# Patient Record
Sex: FEMALE | Race: WHITE | Employment: OTHER | URBAN - METROPOLITAN AREA
[De-identification: names, ages, dates, MRNs, and addresses within clinical notes are randomized per-mention and may not be internally consistent; named-entity substitution may affect disease eponyms.]

---

## 2017-01-16 ENCOUNTER — OFFICE VISIT (OUTPATIENT)
Dept: FAMILY MEDICINE CLINIC | Age: 57
End: 2017-01-16

## 2017-01-16 ENCOUNTER — HOSPITAL ENCOUNTER (OUTPATIENT)
Dept: CT IMAGING | Age: 57
Discharge: HOME | End: 2017-01-16
Attending: NURSE PRACTITIONER | Admitting: NURSE PRACTITIONER

## 2017-01-16 ENCOUNTER — TELEPHONE (OUTPATIENT)
Dept: FAMILY MEDICINE CLINIC | Age: 57
End: 2017-01-16

## 2017-01-16 VITALS
HEIGHT: 64 IN | BODY MASS INDEX: 35.68 KG/M2 | WEIGHT: 209 LBS | OXYGEN SATURATION: 98 % | DIASTOLIC BLOOD PRESSURE: 86 MMHG | SYSTOLIC BLOOD PRESSURE: 138 MMHG | HEART RATE: 89 BPM

## 2017-01-16 DIAGNOSIS — R10.32 LLQ ABDOMINAL PAIN: ICD-10-CM

## 2017-01-16 DIAGNOSIS — R10.32 LEFT LOWER QUADRANT PAIN: ICD-10-CM

## 2017-01-16 DIAGNOSIS — R10.32 LLQ ABDOMINAL PAIN: Primary | ICD-10-CM

## 2017-01-16 PROCEDURE — 99213 OFFICE O/P EST LOW 20 MIN: CPT | Performed by: NURSE PRACTITIONER

## 2017-01-16 ASSESSMENT — ENCOUNTER SYMPTOMS
CONSTIPATION: 0
VOMITING: 0
NAUSEA: 0
ABDOMINAL PAIN: 1
BLOOD IN STOOL: 0
DIARRHEA: 1

## 2017-01-23 ENCOUNTER — OFFICE VISIT (OUTPATIENT)
Dept: FAMILY MEDICINE CLINIC | Age: 57
End: 2017-01-23

## 2017-01-23 VITALS
OXYGEN SATURATION: 97 % | HEART RATE: 60 BPM | DIASTOLIC BLOOD PRESSURE: 82 MMHG | BODY MASS INDEX: 36.37 KG/M2 | RESPIRATION RATE: 14 BRPM | WEIGHT: 213 LBS | HEIGHT: 64 IN | SYSTOLIC BLOOD PRESSURE: 160 MMHG

## 2017-01-23 DIAGNOSIS — E78.2 MIXED HYPERLIPIDEMIA: ICD-10-CM

## 2017-01-23 DIAGNOSIS — E66.09 NON MORBID OBESITY DUE TO EXCESS CALORIES: ICD-10-CM

## 2017-01-23 DIAGNOSIS — K80.20 CALCULUS OF GALLBLADDER WITHOUT CHOLECYSTITIS WITHOUT OBSTRUCTION: ICD-10-CM

## 2017-01-23 DIAGNOSIS — I10 ESSENTIAL HYPERTENSION: Primary | ICD-10-CM

## 2017-01-23 PROCEDURE — 99214 OFFICE O/P EST MOD 30 MIN: CPT | Performed by: FAMILY MEDICINE

## 2017-01-23 RX ORDER — ACETAMINOPHEN 160 MG
TABLET,DISINTEGRATING ORAL
COMMUNITY
End: 2019-03-12

## 2017-01-23 RX ORDER — AMLODIPINE BESYLATE 10 MG/1
10 TABLET ORAL DAILY
Qty: 90 TABLET | Refills: 3 | Status: SHIPPED | OUTPATIENT
Start: 2017-01-23 | End: 2017-02-03

## 2017-01-23 RX ORDER — LOSARTAN POTASSIUM 50 MG/1
50 TABLET ORAL DAILY
Qty: 30 TABLET | Refills: 3 | Status: SHIPPED | OUTPATIENT
Start: 2017-01-23 | End: 2017-04-03 | Stop reason: DRUGHIGH

## 2017-01-31 ENCOUNTER — OFFICE VISIT (OUTPATIENT)
Dept: SURGERY | Age: 57
End: 2017-01-31

## 2017-01-31 VITALS — WEIGHT: 210 LBS | DIASTOLIC BLOOD PRESSURE: 80 MMHG | BODY MASS INDEX: 35.71 KG/M2 | SYSTOLIC BLOOD PRESSURE: 150 MMHG

## 2017-01-31 DIAGNOSIS — K80.20 SYMPTOMATIC CHOLELITHIASIS: Primary | ICD-10-CM

## 2017-01-31 PROCEDURE — 99243 OFF/OP CNSLTJ NEW/EST LOW 30: CPT | Performed by: SURGERY

## 2017-01-31 ASSESSMENT — ENCOUNTER SYMPTOMS
BACK PAIN: 1
SORE THROAT: 1
EYES NEGATIVE: 1
ALLERGIC/IMMUNOLOGIC NEGATIVE: 1
GASTROINTESTINAL NEGATIVE: 1
SINUS PRESSURE: 1
RESPIRATORY NEGATIVE: 1

## 2017-02-17 ENCOUNTER — OFFICE VISIT (OUTPATIENT)
Dept: SURGERY | Age: 57
End: 2017-02-17

## 2017-02-17 VITALS — DIASTOLIC BLOOD PRESSURE: 80 MMHG | SYSTOLIC BLOOD PRESSURE: 160 MMHG

## 2017-02-17 DIAGNOSIS — K80.20 SYMPTOMATIC CHOLELITHIASIS: Primary | ICD-10-CM

## 2017-02-17 PROCEDURE — 99024 POSTOP FOLLOW-UP VISIT: CPT | Performed by: SURGERY

## 2017-02-20 ENCOUNTER — OFFICE VISIT (OUTPATIENT)
Dept: FAMILY MEDICINE CLINIC | Age: 57
End: 2017-02-20

## 2017-02-20 VITALS
BODY MASS INDEX: 36.02 KG/M2 | DIASTOLIC BLOOD PRESSURE: 90 MMHG | HEIGHT: 64 IN | WEIGHT: 211 LBS | OXYGEN SATURATION: 98 % | HEART RATE: 69 BPM | RESPIRATION RATE: 14 BRPM | SYSTOLIC BLOOD PRESSURE: 160 MMHG

## 2017-02-20 DIAGNOSIS — I10 ESSENTIAL HYPERTENSION: Primary | ICD-10-CM

## 2017-02-20 DIAGNOSIS — E66.09 NON MORBID OBESITY DUE TO EXCESS CALORIES: ICD-10-CM

## 2017-02-20 PROCEDURE — 99213 OFFICE O/P EST LOW 20 MIN: CPT | Performed by: FAMILY MEDICINE

## 2017-02-20 RX ORDER — LOSARTAN POTASSIUM 100 MG/1
100 TABLET ORAL DAILY
Qty: 90 TABLET | Refills: 3 | Status: SHIPPED | OUTPATIENT
Start: 2017-02-20 | End: 2018-01-23 | Stop reason: SDUPTHER

## 2017-04-03 ENCOUNTER — OFFICE VISIT (OUTPATIENT)
Dept: FAMILY MEDICINE CLINIC | Age: 57
End: 2017-04-03

## 2017-04-03 VITALS
HEIGHT: 64 IN | SYSTOLIC BLOOD PRESSURE: 172 MMHG | BODY MASS INDEX: 35.51 KG/M2 | RESPIRATION RATE: 14 BRPM | WEIGHT: 208 LBS | DIASTOLIC BLOOD PRESSURE: 80 MMHG | OXYGEN SATURATION: 98 % | HEART RATE: 68 BPM

## 2017-04-03 DIAGNOSIS — E66.09 NON MORBID OBESITY DUE TO EXCESS CALORIES: ICD-10-CM

## 2017-04-03 DIAGNOSIS — I10 ESSENTIAL HYPERTENSION: Primary | ICD-10-CM

## 2017-04-03 PROCEDURE — 99213 OFFICE O/P EST LOW 20 MIN: CPT | Performed by: FAMILY MEDICINE

## 2017-04-03 RX ORDER — HYDROCHLOROTHIAZIDE 12.5 MG/1
12.5 CAPSULE, GELATIN COATED ORAL DAILY
Qty: 90 CAPSULE | Refills: 3 | Status: SHIPPED | OUTPATIENT
Start: 2017-04-03 | End: 2018-03-19 | Stop reason: SDUPTHER

## 2017-05-02 ENCOUNTER — OFFICE VISIT (OUTPATIENT)
Dept: FAMILY MEDICINE CLINIC | Age: 57
End: 2017-05-02

## 2017-05-02 VITALS
SYSTOLIC BLOOD PRESSURE: 158 MMHG | OXYGEN SATURATION: 97 % | RESPIRATION RATE: 16 BRPM | HEART RATE: 66 BPM | HEIGHT: 64 IN | DIASTOLIC BLOOD PRESSURE: 82 MMHG | WEIGHT: 211 LBS | BODY MASS INDEX: 36.02 KG/M2

## 2017-05-02 DIAGNOSIS — I10 ESSENTIAL HYPERTENSION: Primary | ICD-10-CM

## 2017-05-02 PROCEDURE — 99213 OFFICE O/P EST LOW 20 MIN: CPT | Performed by: FAMILY MEDICINE

## 2017-07-05 ENCOUNTER — OFFICE VISIT (OUTPATIENT)
Dept: FAMILY MEDICINE CLINIC | Age: 57
End: 2017-07-05

## 2017-07-05 VITALS
RESPIRATION RATE: 14 BRPM | OXYGEN SATURATION: 96 % | HEART RATE: 63 BPM | WEIGHT: 207.2 LBS | HEIGHT: 65 IN | DIASTOLIC BLOOD PRESSURE: 80 MMHG | BODY MASS INDEX: 34.52 KG/M2 | SYSTOLIC BLOOD PRESSURE: 148 MMHG

## 2017-07-05 DIAGNOSIS — E78.2 MIXED HYPERLIPIDEMIA: ICD-10-CM

## 2017-07-05 DIAGNOSIS — I10 ESSENTIAL HYPERTENSION: Primary | ICD-10-CM

## 2017-07-05 DIAGNOSIS — E66.09 NON MORBID OBESITY DUE TO EXCESS CALORIES: ICD-10-CM

## 2017-07-05 PROCEDURE — 99213 OFFICE O/P EST LOW 20 MIN: CPT | Performed by: FAMILY MEDICINE

## 2017-07-05 RX ORDER — AMLODIPINE BESYLATE 5 MG/1
5 TABLET ORAL DAILY
Qty: 30 TABLET | Refills: 3 | Status: SHIPPED | OUTPATIENT
Start: 2017-07-05 | End: 2017-10-28 | Stop reason: SDUPTHER

## 2017-08-02 ENCOUNTER — OFFICE VISIT (OUTPATIENT)
Dept: FAMILY MEDICINE CLINIC | Age: 57
End: 2017-08-02

## 2017-08-02 VITALS
OXYGEN SATURATION: 98 % | SYSTOLIC BLOOD PRESSURE: 130 MMHG | BODY MASS INDEX: 35.09 KG/M2 | DIASTOLIC BLOOD PRESSURE: 72 MMHG | WEIGHT: 210.6 LBS | RESPIRATION RATE: 16 BRPM | HEART RATE: 68 BPM | HEIGHT: 65 IN

## 2017-08-02 DIAGNOSIS — I10 ESSENTIAL HYPERTENSION: Primary | ICD-10-CM

## 2017-08-02 PROCEDURE — 99213 OFFICE O/P EST LOW 20 MIN: CPT | Performed by: FAMILY MEDICINE

## 2017-10-28 DIAGNOSIS — I10 ESSENTIAL HYPERTENSION: ICD-10-CM

## 2017-10-30 RX ORDER — AMLODIPINE BESYLATE 5 MG/1
TABLET ORAL
Qty: 30 TABLET | Refills: 3 | Status: SHIPPED | OUTPATIENT
Start: 2017-10-30 | End: 2017-11-28 | Stop reason: ALTCHOICE

## 2017-10-30 NOTE — TELEPHONE ENCOUNTER
Last OV  8/2/17    Last refill  7/5/17    # 30    R 3    Lab Results   Component Value Date     02/03/2017    K 3.6 02/03/2017    CL 98 (L) 02/03/2017    CO2 27 02/03/2017    BUN 18 02/03/2017    CREATININE <0.5 (L) 02/03/2017    GLUCOSE 158 (H) 02/03/2017    CALCIUM 9.6 02/03/2017    PROT 8.4 (H) 02/03/2017    LABALBU 4.8 02/03/2017    BILITOT 0.3 02/03/2017    ALKPHOS 60 02/03/2017    AST 17 02/03/2017    ALT 22 02/03/2017    LABGLOM >60 02/03/2017    GFRAA >60 02/03/2017    AGRATIO 1.3 02/03/2017    GLOB 3.6 02/03/2017

## 2017-11-07 ENCOUNTER — TELEPHONE (OUTPATIENT)
Dept: FAMILY MEDICINE CLINIC | Age: 57
End: 2017-11-07

## 2017-11-07 DIAGNOSIS — Z00.00 WELL ADULT EXAM: ICD-10-CM

## 2017-11-07 DIAGNOSIS — E78.2 MIXED HYPERLIPIDEMIA: Primary | ICD-10-CM

## 2017-11-07 DIAGNOSIS — I10 ESSENTIAL HYPERTENSION: ICD-10-CM

## 2017-11-07 DIAGNOSIS — R73.09 ELEVATED GLUCOSE: ICD-10-CM

## 2017-11-07 NOTE — TELEPHONE ENCOUNTER
Pt scheduled an appt for a phy on 11/28/17 / Dr. Pam Ladd. Needs orders placed to get bloodwork prior to appt.  Please advise

## 2017-11-22 DIAGNOSIS — R73.09 ELEVATED GLUCOSE: ICD-10-CM

## 2017-11-22 DIAGNOSIS — I10 ESSENTIAL HYPERTENSION: ICD-10-CM

## 2017-11-22 DIAGNOSIS — Z00.00 WELL ADULT EXAM: ICD-10-CM

## 2017-11-22 DIAGNOSIS — E78.2 MIXED HYPERLIPIDEMIA: ICD-10-CM

## 2017-11-22 LAB
A/G RATIO: 1.6 (ref 1.1–2.2)
ALBUMIN SERPL-MCNC: 4.4 G/DL (ref 3.4–5)
ALP BLD-CCNC: 60 U/L (ref 40–129)
ALT SERPL-CCNC: 20 U/L (ref 10–40)
ANION GAP SERPL CALCULATED.3IONS-SCNC: 14 MMOL/L (ref 3–16)
AST SERPL-CCNC: 18 U/L (ref 15–37)
BASOPHILS ABSOLUTE: 0.1 K/UL (ref 0–0.2)
BASOPHILS RELATIVE PERCENT: 0.7 %
BILIRUB SERPL-MCNC: 0.5 MG/DL (ref 0–1)
BUN BLDV-MCNC: 17 MG/DL (ref 7–20)
CALCIUM SERPL-MCNC: 9.4 MG/DL (ref 8.3–10.6)
CHLORIDE BLD-SCNC: 102 MMOL/L (ref 99–110)
CHOLESTEROL, TOTAL: 217 MG/DL (ref 0–199)
CO2: 26 MMOL/L (ref 21–32)
CREAT SERPL-MCNC: <0.5 MG/DL (ref 0.6–1.1)
EOSINOPHILS ABSOLUTE: 0.4 K/UL (ref 0–0.6)
EOSINOPHILS RELATIVE PERCENT: 5.9 %
GFR AFRICAN AMERICAN: >60
GFR NON-AFRICAN AMERICAN: >60
GLOBULIN: 2.7 G/DL
GLUCOSE BLD-MCNC: 101 MG/DL (ref 70–99)
HCT VFR BLD CALC: 41.5 % (ref 36–48)
HDLC SERPL-MCNC: 62 MG/DL (ref 40–60)
HEMOGLOBIN: 13.7 G/DL (ref 12–16)
HEPATITIS C ANTIBODY INTERPRETATION: NORMAL
LDL CHOLESTEROL CALCULATED: 138 MG/DL
LYMPHOCYTES ABSOLUTE: 2.8 K/UL (ref 1–5.1)
LYMPHOCYTES RELATIVE PERCENT: 36.7 %
MCH RBC QN AUTO: 28.2 PG (ref 26–34)
MCHC RBC AUTO-ENTMCNC: 32.9 G/DL (ref 31–36)
MCV RBC AUTO: 85.8 FL (ref 80–100)
MONOCYTES ABSOLUTE: 0.6 K/UL (ref 0–1.3)
MONOCYTES RELATIVE PERCENT: 8.6 %
NEUTROPHILS ABSOLUTE: 3.6 K/UL (ref 1.7–7.7)
NEUTROPHILS RELATIVE PERCENT: 48.1 %
PDW BLD-RTO: 13.2 % (ref 12.4–15.4)
PLATELET # BLD: 297 K/UL (ref 135–450)
PMV BLD AUTO: 9.4 FL (ref 5–10.5)
POTASSIUM SERPL-SCNC: 4 MMOL/L (ref 3.5–5.1)
RBC # BLD: 4.84 M/UL (ref 4–5.2)
SODIUM BLD-SCNC: 142 MMOL/L (ref 136–145)
TOTAL PROTEIN: 7.1 G/DL (ref 6.4–8.2)
TRIGL SERPL-MCNC: 83 MG/DL (ref 0–150)
TSH REFLEX: 0.82 UIU/ML (ref 0.27–4.2)
VLDLC SERPL CALC-MCNC: 17 MG/DL
WBC # BLD: 7.5 K/UL (ref 4–11)

## 2017-11-23 LAB
ESTIMATED AVERAGE GLUCOSE: 119.8 MG/DL
HBA1C MFR BLD: 5.8 %
HIV-1 AND HIV-2 ANTIBODIES: NORMAL

## 2017-11-28 ENCOUNTER — OFFICE VISIT (OUTPATIENT)
Dept: FAMILY MEDICINE CLINIC | Age: 57
End: 2017-11-28

## 2017-11-28 VITALS
DIASTOLIC BLOOD PRESSURE: 80 MMHG | WEIGHT: 212.6 LBS | BODY MASS INDEX: 35.42 KG/M2 | HEIGHT: 65 IN | SYSTOLIC BLOOD PRESSURE: 138 MMHG | OXYGEN SATURATION: 97 % | HEART RATE: 69 BPM

## 2017-11-28 DIAGNOSIS — Z00.00 ANNUAL PHYSICAL EXAM: ICD-10-CM

## 2017-11-28 DIAGNOSIS — Z87.891 EX-HEAVY CIGARETTE SMOKER (20-39 PER DAY): ICD-10-CM

## 2017-11-28 DIAGNOSIS — R73.03 PREDIABETES: ICD-10-CM

## 2017-11-28 DIAGNOSIS — I10 ESSENTIAL HYPERTENSION: ICD-10-CM

## 2017-11-28 DIAGNOSIS — Z23 FLU VACCINE NEED: Primary | ICD-10-CM

## 2017-11-28 LAB
BILIRUBIN, POC: NORMAL
BLOOD URINE, POC: NORMAL
CLARITY, POC: NORMAL
COLOR, POC: YELLOW
GLUCOSE URINE, POC: NORMAL
KETONES, POC: NORMAL
LEUKOCYTE EST, POC: NORMAL
NITRITE, POC: NORMAL
PH, POC: 6.5
PROTEIN, POC: NORMAL
SPECIFIC GRAVITY, POC: 1.02
UROBILINOGEN, POC: 0.2

## 2017-11-28 PROCEDURE — 81002 URINALYSIS NONAUTO W/O SCOPE: CPT | Performed by: FAMILY MEDICINE

## 2017-11-28 PROCEDURE — 90471 IMMUNIZATION ADMIN: CPT | Performed by: FAMILY MEDICINE

## 2017-11-28 PROCEDURE — 90688 IIV4 VACCINE SPLT 0.5 ML IM: CPT | Performed by: FAMILY MEDICINE

## 2017-11-28 PROCEDURE — 99396 PREV VISIT EST AGE 40-64: CPT | Performed by: FAMILY MEDICINE

## 2017-11-28 RX ORDER — AMLODIPINE BESYLATE 10 MG/1
10 TABLET ORAL NIGHTLY
Qty: 90 TABLET | Refills: 1 | Status: SHIPPED | OUTPATIENT
Start: 2017-11-28 | End: 2019-03-12

## 2017-11-28 NOTE — PROGRESS NOTES
Chief Complaint:   Janis Michael is a 61 y.o. female who presents for complete physical examination    History of Present Illness:    Meds, vitamins and allergies reviewed with pt  Labs reviewed with pt  Requesting low dose chest CT with a past history of smoking and smoke exposure  TRACE/BSO , does not feel she needs a GYN exam  Takes care of her handicapped daughter all day long, stress eater  has lost significant weight in the past- knows she needs to get back on healthy diet  Requesting flu vaccine    Wt Readings from Last 3 Encounters:   11/28/17 212 lb 9.6 oz (96.4 kg)   08/02/17 210 lb 9.6 oz (95.5 kg)   07/05/17 207 lb 3.2 oz (94 kg)       Patient Active Problem List   Diagnosis    HTN (hypertension)    Hyperlipidemia    Obesity    Hypertension    Breast lump or mass    Calculus of gallbladder without cholecystitis    Acute cholecystitis due to biliary calculus     Past Medical History:   Diagnosis Date    Breast lump or mass     Gallstones     Hypertension        Past Surgical History:   Procedure Laterality Date    BREAST SURGERY  [id number]    CHOLECYSTECTOMY, LAPAROSCOPIC  02/03/2017    COLONOSCOPY  8.1.2010    CYSTOSCOPY  7.19.2007    HYSTERECTOMY  2007       Current Outpatient Prescriptions   Medication Sig Dispense Refill    amLODIPine (NORVASC) 10 MG tablet Take 1 tablet by mouth nightly 90 tablet 1    hydrochlorothiazide (MICROZIDE) 12.5 MG capsule Take 1 capsule by mouth daily 90 capsule 3    losartan (COZAAR) 100 MG tablet Take 1 tablet by mouth daily 90 tablet 3    aspirin 81 MG tablet Take 81 mg by mouth daily      Cholecalciferol (VITAMIN D3) 2000 UNITS CAPS Take by mouth       No current facility-administered medications for this visit.       Allergies   Allergen Reactions    Morphine     Penicillins        Social History     Social History    Marital status:      Spouse name: N/A    Number of children: N/A    Years of education: N/A     Social History Main Topics    Smoking status: Former Smoker    Smokeless tobacco: Never Used    Alcohol use No    Drug use: Unknown    Sexual activity: Yes     Partners: Male     Other Topics Concern    None     Social History Narrative    None     Family History   Problem Relation Age of Onset    Cancer Mother     Cancer Father     Cancer Brother                              REVIEW OF SYSTEMS:   CONSTITUTIONAL: Some weight gain , no fatigue,no  weakness, no night sweats or fever. HEENT: No new vision difficulties or ringing in the ears. RESPIRATORY: No new SOB, PND, orthopnea or cough. CARDIOVASCULAR: no CP, palpitations or SOB with exertion  GI: No nausea, vomiting, diarrhea, constipation, abdominal pain or changes in bowel habits. : No urinary frequency, urgency, incontinence hematuria or dysuria. SKIN: No cyanosis or skin lesions. MUSCULOSKELETAL: No new muscle or joint pain. NEUROLOGICAL: No syncope or TIA-like symptoms. PSYCHIATRIC: No anxiety, insomnia or depression     PHYSICAL EXAMINATION:  /80 (Site: Left Arm, Position: Sitting, Cuff Size: Large Adult)   Pulse 69   Ht 5' 4.5\" (1.638 m)   Wt 212 lb 9.6 oz (96.4 kg)   SpO2 97%   BMI 35.93 kg/m²   General appearance:  alert, no distress  Skin: Skin color, texture, turgor normal. No rashes or lesions. Head: Normocephalic. No masses, lesions, tenderness or abnormalities  Eyes: conjunctivae/corneas clear. PERRL, EOM's intact. Ears: External ears normal. Canals clear. TM's clear bilaterally. Nose/Sinuses: Nares normal. Septum midline. Mucosa normal. No drainage or sinus tenderness. Oropharynx: Lips, mucosa, and tongue normal. Teeth and gums normal. Oropharynx clear with no exudate seen. Neck: Neck supple, and symmetric. No adenopathy. Thyroid symmetric, normal size, without nodule. Trachea is midline. No bruits. Back: Back symmetric, no curvature. ROM normal. No CVA tenderness. Lungs: Lungs clear to auscultation bilaterally.   No retractions or use of accessory muscles. Heart: Regular rate and rhythm, with no rub, murmur or gallop noted. Breasts: no masses, skin changes or DC  Abdomen: Abdomen soft, non-tender, obese , BS normal. No masses, organomegaly. No hernia noted. Extremities: Extremities normal. No deformities, edema, or skin discoloration. No cyanosis or clubbing noted to the nails. Lymph: No lymphadenopathy of the neck or supraclavicular regions. Musculoskeletal: Spine ROM normal. Muscular strength intact. Peripheral pulses: radial=4/4, femoral=4/4, dorsalis pedis=4/4,  Neuro: Cranial nerves intact, Gait normal. Reflexes normal and symmetric, with no pathologic reflex noted. No focal weakness. Normal sensation to light touch. Pelvic/Rectal: Exam deferred to OB/GYN    All labs reviewed with patient    Urinalysis dip is normal    Lab Results   Component Value Date    LABA1C 5.8 11/22/2017     Lab Results   Component Value Date    .8 11/22/2017     Lab Results   Component Value Date    CHOL 217 (H) 11/22/2017    CHOL 214 (H) 12/05/2016    CHOL 201 (H) 12/14/2015     Lab Results   Component Value Date    TRIG 83 11/22/2017    TRIG 83 12/05/2016    TRIG 136 12/14/2015     Lab Results   Component Value Date    HDL 62 (H) 11/22/2017    HDL 53 12/05/2016    HDL 48 12/14/2015     Lab Results   Component Value Date    LDLCALC 138 (H) 11/22/2017    LDLCALC 144 (H) 12/05/2016    LDLCALC 126 (H) 12/14/2015     Lab Results   Component Value Date    LABVLDL 17 11/22/2017    LABVLDL 17 12/05/2016    LABVLDL 27 12/14/2015     Lab Results   Component Value Date    WBC 7.5 11/22/2017    HGB 13.7 11/22/2017    HCT 41.5 11/22/2017    MCV 85.8 11/22/2017     11/22/2017     ASSESSMENT/PLAN:  1. Flu vaccine need  Administered today  - INFLUENZA, QUADV, 3 YRS AND OLDER, IM, MDV, 0.5ML ([facility])    2. Annual physical exam  Healthy diet and exercise  - POCT Urinalysis no Micro    3.  Essential hypertension  DC verapamil and take 10 mg of Norvasc at bedtime  Monitor blood pressure and report in 2 weeks  Recheck one month for blood pressure check  - amLODIPine (NORVASC) 10 MG tablet; Take 1 tablet by mouth nightly  Dispense: 90 tablet; Refill: 1    4. Prediabetes  Healthy diet and exercise/Mediterranean diet info given to patient  - Hemoglobin A1C; Future  - Lipid Panel; Future    5.  Ex-heavy cigarette smoker (20-39 per day)  Patient requesting another chest CT  Many family members with lung and throat cancer from smoking and smoke exposure  - CT Chest Diagnostic Low Dose; Future

## 2017-11-28 NOTE — PATIENT INSTRUCTIONS
a medical condition or this instruction, always ask your healthcare professional. [place] [id number] any warranty or liability for your use of this information. 1 year

## 2018-01-23 RX ORDER — LOSARTAN POTASSIUM 100 MG/1
TABLET ORAL
Qty: 30 TABLET | Refills: 9 | Status: SHIPPED | OUTPATIENT
Start: 2018-01-23 | End: 2018-11-06 | Stop reason: SDUPTHER

## 2018-01-23 NOTE — TELEPHONE ENCOUNTER
Last OV 11/28/2017 annual exam   Last Refill 02/20/2017 #90 3 refills   Lab Results   Component Value Date     11/22/2017    K 4.0 11/22/2017     11/22/2017    CO2 26 11/22/2017    BUN 17 11/22/2017    CREATININE <0.5 (L) 11/22/2017    GLUCOSE 101 (H) 11/22/2017    CALCIUM 9.4 11/22/2017    PROT 7.1 11/22/2017    LABALBU 4.4 11/22/2017    BILITOT 0.5 11/22/2017    ALKPHOS 60 11/22/2017    AST 18 11/22/2017    ALT 20 11/22/2017    LABGLOM >60 11/22/2017    GFRAA >60 11/22/2017    AGRATIO 1.6 11/22/2017    GLOB 2.7 11/22/2017

## 2018-03-19 DIAGNOSIS — I10 ESSENTIAL HYPERTENSION: ICD-10-CM

## 2018-03-19 DIAGNOSIS — E66.09 NON MORBID OBESITY DUE TO EXCESS CALORIES: ICD-10-CM

## 2018-03-19 RX ORDER — HYDROCHLOROTHIAZIDE 12.5 MG/1
CAPSULE, GELATIN COATED ORAL
Qty: 90 CAPSULE | Refills: 3 | Status: SHIPPED | OUTPATIENT
Start: 2018-03-19 | End: 2019-03-12 | Stop reason: SDUPTHER

## 2018-11-06 RX ORDER — LOSARTAN POTASSIUM 100 MG/1
TABLET ORAL
Qty: 30 TABLET | Refills: 0 | Status: SHIPPED | OUTPATIENT
Start: 2018-11-06 | End: 2019-03-12

## 2019-02-28 ENCOUNTER — TELEPHONE (OUTPATIENT)
Dept: FAMILY MEDICINE CLINIC | Age: 59
End: 2019-02-28

## 2019-02-28 DIAGNOSIS — R73.9 HYPERGLYCEMIA: ICD-10-CM

## 2019-02-28 DIAGNOSIS — Z00.00 ANNUAL PHYSICAL EXAM: Primary | ICD-10-CM

## 2019-02-28 DIAGNOSIS — E78.2 MIXED HYPERLIPIDEMIA: ICD-10-CM

## 2019-02-28 DIAGNOSIS — I10 HYPERTENSION, UNSPECIFIED TYPE: ICD-10-CM

## 2019-03-04 DIAGNOSIS — R73.9 HYPERGLYCEMIA: ICD-10-CM

## 2019-03-04 DIAGNOSIS — Z00.00 ANNUAL PHYSICAL EXAM: ICD-10-CM

## 2019-03-04 DIAGNOSIS — I10 HYPERTENSION, UNSPECIFIED TYPE: ICD-10-CM

## 2019-03-04 LAB
A/G RATIO: 1.4 (ref 1.1–2.2)
ALBUMIN SERPL-MCNC: 3.7 G/DL (ref 3.4–5)
ALP BLD-CCNC: 48 U/L (ref 40–129)
ALT SERPL-CCNC: 11 U/L (ref 10–40)
ANION GAP SERPL CALCULATED.3IONS-SCNC: 14 MMOL/L (ref 3–16)
AST SERPL-CCNC: 11 U/L (ref 15–37)
BASOPHILS ABSOLUTE: 0.1 K/UL (ref 0–0.2)
BASOPHILS RELATIVE PERCENT: 0.6 %
BILIRUB SERPL-MCNC: 0.9 MG/DL (ref 0–1)
BUN BLDV-MCNC: 11 MG/DL (ref 7–20)
CALCIUM SERPL-MCNC: 9 MG/DL (ref 8.3–10.6)
CHLORIDE BLD-SCNC: 102 MMOL/L (ref 99–110)
CHOLESTEROL, TOTAL: 173 MG/DL (ref 0–199)
CO2: 27 MMOL/L (ref 21–32)
CREAT SERPL-MCNC: <0.5 MG/DL (ref 0.6–1.1)
EOSINOPHILS ABSOLUTE: 0.1 K/UL (ref 0–0.6)
EOSINOPHILS RELATIVE PERCENT: 0.6 %
ESTIMATED AVERAGE GLUCOSE: 114 MG/DL
GFR AFRICAN AMERICAN: >60
GFR NON-AFRICAN AMERICAN: >60
GLOBULIN: 2.7 G/DL
GLUCOSE BLD-MCNC: 113 MG/DL (ref 70–99)
HBA1C MFR BLD: 5.6 %
HCT VFR BLD CALC: 38.9 % (ref 36–48)
HDLC SERPL-MCNC: 82 MG/DL (ref 40–60)
HEMOGLOBIN: 13 G/DL (ref 12–16)
LDL CHOLESTEROL CALCULATED: 80 MG/DL
LYMPHOCYTES ABSOLUTE: 1.9 K/UL (ref 1–5.1)
LYMPHOCYTES RELATIVE PERCENT: 20.7 %
MCH RBC QN AUTO: 28.8 PG (ref 26–34)
MCHC RBC AUTO-ENTMCNC: 33.4 G/DL (ref 31–36)
MCV RBC AUTO: 86.3 FL (ref 80–100)
MONOCYTES ABSOLUTE: 1 K/UL (ref 0–1.3)
MONOCYTES RELATIVE PERCENT: 10.5 %
NEUTROPHILS ABSOLUTE: 6.3 K/UL (ref 1.7–7.7)
NEUTROPHILS RELATIVE PERCENT: 67.6 %
PDW BLD-RTO: 13.5 % (ref 12.4–15.4)
PLATELET # BLD: 244 K/UL (ref 135–450)
PMV BLD AUTO: 9.8 FL (ref 5–10.5)
POTASSIUM SERPL-SCNC: 3.5 MMOL/L (ref 3.5–5.1)
RBC # BLD: 4.5 M/UL (ref 4–5.2)
SODIUM BLD-SCNC: 143 MMOL/L (ref 136–145)
TOTAL PROTEIN: 6.4 G/DL (ref 6.4–8.2)
TRIGL SERPL-MCNC: 55 MG/DL (ref 0–150)
TSH SERPL DL<=0.05 MIU/L-ACNC: 0.42 UIU/ML (ref 0.27–4.2)
VITAMIN D 25-HYDROXY: 18.7 NG/ML
VLDLC SERPL CALC-MCNC: 11 MG/DL
WBC # BLD: 9.4 K/UL (ref 4–11)

## 2019-03-12 ENCOUNTER — OFFICE VISIT (OUTPATIENT)
Dept: FAMILY MEDICINE CLINIC | Age: 59
End: 2019-03-12
Payer: COMMERCIAL

## 2019-03-12 ENCOUNTER — PATIENT MESSAGE (OUTPATIENT)
Dept: FAMILY MEDICINE CLINIC | Age: 59
End: 2019-03-12

## 2019-03-12 VITALS
DIASTOLIC BLOOD PRESSURE: 80 MMHG | HEART RATE: 63 BPM | WEIGHT: 184 LBS | BODY MASS INDEX: 31.41 KG/M2 | HEIGHT: 64 IN | SYSTOLIC BLOOD PRESSURE: 150 MMHG | OXYGEN SATURATION: 98 %

## 2019-03-12 DIAGNOSIS — Z80.1 FAMILY HISTORY OF LUNG CANCER: ICD-10-CM

## 2019-03-12 DIAGNOSIS — Z87.891 EX-CIGARETTE SMOKER: ICD-10-CM

## 2019-03-12 DIAGNOSIS — E78.2 MIXED HYPERLIPIDEMIA: ICD-10-CM

## 2019-03-12 DIAGNOSIS — Z00.00 PHYSICAL EXAM, ROUTINE: Primary | ICD-10-CM

## 2019-03-12 DIAGNOSIS — I10 ESSENTIAL HYPERTENSION: ICD-10-CM

## 2019-03-12 DIAGNOSIS — E66.09 NON MORBID OBESITY DUE TO EXCESS CALORIES: ICD-10-CM

## 2019-03-12 LAB
BILIRUBIN, POC: NORMAL
BLOOD URINE, POC: NORMAL
CLARITY, POC: CLEAR
COLOR, POC: YELLOW
GLUCOSE URINE, POC: NORMAL
KETONES, POC: NORMAL
LEUKOCYTE EST, POC: NORMAL
NITRITE, POC: NORMAL
PH, POC: 6
PROTEIN, POC: NORMAL
SPECIFIC GRAVITY, POC: 1.02
UROBILINOGEN, POC: 0.2

## 2019-03-12 PROCEDURE — 81002 URINALYSIS NONAUTO W/O SCOPE: CPT | Performed by: FAMILY MEDICINE

## 2019-03-12 PROCEDURE — 99396 PREV VISIT EST AGE 40-64: CPT | Performed by: FAMILY MEDICINE

## 2019-03-12 RX ORDER — HYDROCHLOROTHIAZIDE 12.5 MG/1
12.5 CAPSULE, GELATIN COATED ORAL
Qty: 90 CAPSULE | Refills: 3 | Status: SHIPPED | OUTPATIENT
Start: 2019-03-12 | End: 2019-09-17 | Stop reason: ALTCHOICE

## 2019-03-12 ASSESSMENT — PATIENT HEALTH QUESTIONNAIRE - PHQ9
SUM OF ALL RESPONSES TO PHQ QUESTIONS 1-9: 0
SUM OF ALL RESPONSES TO PHQ QUESTIONS 1-9: 0
1. LITTLE INTEREST OR PLEASURE IN DOING THINGS: 0
SUM OF ALL RESPONSES TO PHQ9 QUESTIONS 1 & 2: 0
2. FEELING DOWN, DEPRESSED OR HOPELESS: 0

## 2019-03-19 ENCOUNTER — HOSPITAL ENCOUNTER (OUTPATIENT)
Dept: CT IMAGING | Age: 59
Discharge: HOME | End: 2019-03-19
Payer: COMMERCIAL

## 2019-03-19 PROCEDURE — G0297 LDCT FOR LUNG CA SCREEN: HCPCS

## 2019-08-13 DIAGNOSIS — Z87.898 HX OF ABNORMAL MAMMOGRAM: Primary | ICD-10-CM

## 2019-09-17 ENCOUNTER — OFFICE VISIT (OUTPATIENT)
Dept: FAMILY MEDICINE CLINIC | Age: 59
End: 2019-09-17
Payer: COMMERCIAL

## 2019-09-17 DIAGNOSIS — Z01.818 PREOP EXAMINATION: Primary | ICD-10-CM

## 2019-09-17 DIAGNOSIS — S89.91XA INJURY OF RIGHT KNEE, INITIAL ENCOUNTER: ICD-10-CM

## 2019-09-17 DIAGNOSIS — I10 ESSENTIAL HYPERTENSION: ICD-10-CM

## 2019-09-17 DIAGNOSIS — Z82.49 FAMILY HISTORY OF ESSENTIAL HYPERTENSION: ICD-10-CM

## 2019-09-17 LAB
A/G RATIO: 1.9 (ref 1.1–2.2)
ALBUMIN SERPL-MCNC: 4.7 G/DL (ref 3.4–5)
ALP BLD-CCNC: 64 U/L (ref 40–129)
ALT SERPL-CCNC: 18 U/L (ref 10–40)
ANION GAP SERPL CALCULATED.3IONS-SCNC: 16 MMOL/L (ref 3–16)
AST SERPL-CCNC: 18 U/L (ref 15–37)
BASOPHILS ABSOLUTE: 0.2 K/UL (ref 0–0.2)
BASOPHILS RELATIVE PERCENT: 2.5 %
BILIRUB SERPL-MCNC: 0.4 MG/DL (ref 0–1)
BUN BLDV-MCNC: 12 MG/DL (ref 7–20)
CALCIUM SERPL-MCNC: 9.6 MG/DL (ref 8.3–10.6)
CHLORIDE BLD-SCNC: 99 MMOL/L (ref 99–110)
CO2: 26 MMOL/L (ref 21–32)
CREAT SERPL-MCNC: <0.5 MG/DL (ref 0.6–1.2)
EOSINOPHILS ABSOLUTE: 0.2 K/UL (ref 0–0.6)
EOSINOPHILS RELATIVE PERCENT: 3.1 %
GFR AFRICAN AMERICAN: >60
GFR NON-AFRICAN AMERICAN: >60
GLOBULIN: 2.5 G/DL
GLUCOSE BLD-MCNC: 102 MG/DL (ref 70–99)
HCT VFR BLD CALC: 39.8 % (ref 36–48)
HEMOGLOBIN: 13.3 G/DL (ref 12–16)
LYMPHOCYTES ABSOLUTE: 2.1 K/UL (ref 1–5.1)
LYMPHOCYTES RELATIVE PERCENT: 30.1 %
MCH RBC QN AUTO: 28.4 PG (ref 26–34)
MCHC RBC AUTO-ENTMCNC: 33.4 G/DL (ref 31–36)
MCV RBC AUTO: 85.1 FL (ref 80–100)
MONOCYTES ABSOLUTE: 0.4 K/UL (ref 0–1.3)
MONOCYTES RELATIVE PERCENT: 5.5 %
NEUTROPHILS ABSOLUTE: 4.1 K/UL (ref 1.7–7.7)
NEUTROPHILS RELATIVE PERCENT: 58.8 %
PDW BLD-RTO: 13.8 % (ref 12.4–15.4)
PLATELET # BLD: 298 K/UL (ref 135–450)
PMV BLD AUTO: 9.4 FL (ref 5–10.5)
POTASSIUM SERPL-SCNC: 3.5 MMOL/L (ref 3.5–5.1)
RBC # BLD: 4.67 M/UL (ref 4–5.2)
SODIUM BLD-SCNC: 141 MMOL/L (ref 136–145)
TOTAL PROTEIN: 7.2 G/DL (ref 6.4–8.2)
TSH REFLEX: 0.24 UIU/ML (ref 0.27–4.2)
WBC # BLD: 7 K/UL (ref 4–11)

## 2019-09-17 PROCEDURE — 99243 OFF/OP CNSLTJ NEW/EST LOW 30: CPT | Performed by: FAMILY MEDICINE

## 2019-09-17 PROCEDURE — 36415 COLL VENOUS BLD VENIPUNCTURE: CPT | Performed by: FAMILY MEDICINE

## 2019-09-17 PROCEDURE — 93000 ELECTROCARDIOGRAM COMPLETE: CPT | Performed by: FAMILY MEDICINE

## 2019-09-17 RX ORDER — LISINOPRIL AND HYDROCHLOROTHIAZIDE 12.5; 1 MG/1; MG/1
1 TABLET ORAL DAILY
Qty: 90 TABLET | Refills: 1 | Status: SHIPPED | OUTPATIENT
Start: 2019-09-17 | End: 2019-09-23 | Stop reason: SDUPTHER

## 2019-09-17 NOTE — PROGRESS NOTES
LAPAROSCOPIC  2017    COLONOSCOPY  8.1.    CYSTOSCOPY  7.19.    HYSTERECTOMY       Family History is not significant for reactions to anesthesia  Social History     Socioeconomic History    Marital status:      Spouse name: Not on file    Number of children: Not on file    Years of education: Not on file    Highest education level: Not on file   Occupational History    Not on file   Social Needs    Financial resource strain: Not on file    Food insecurity:     Worry: Not on file     Inability: Not on file    Transportation needs:     Medical: Not on file     Non-medical: Not on file   Tobacco Use    Smoking status: Former Smoker     Packs/day: 1.75     Years: 30.00     Pack years: 52.50     Types: Cigarettes     Last attempt to quit: 2005     Years since quittin.7    Smokeless tobacco: Never Used   Substance and Sexual Activity    Alcohol use: No     Alcohol/week: 0.0 standard drinks    Drug use: Not on file    Sexual activity: Yes     Partners: Male   Lifestyle    Physical activity:     Days per week: Not on file     Minutes per session: Not on file    Stress: Not on file   Relationships    Social connections:     Talks on phone: Not on file     Gets together: Not on file     Attends [employer]ze service: Not on file     Active member of club or organization: Not on file     Attends meetings of clubs or organizations: Not on file     Relationship status: Not on file    Intimate partner violence:     Fear of current or ex partner: Not on file     Emotionally abused: Not on file     Physically abused: Not on file     Forced sexual activity: Not on file   Other Topics Concern    Not on file   Social History Narrative    Not on file       REVIEW OF SYSTEMS:   CONSTITUTIONAL: No major weight gain or loss, fatigue, weakness, night sweats or fever. HEENT: No new vision difficulties or ringing in the ears. RESPIRATORY: No new SOB, PND, orthopnea or cough.

## 2019-09-18 LAB
ESTIMATED AVERAGE GLUCOSE: 114 MG/DL
HBA1C MFR BLD: 5.6 %
T3 TOTAL: 1.73 NG/ML (ref 0.8–2)
T4 FREE: 1.5 NG/DL (ref 0.9–1.8)

## 2019-09-23 ENCOUNTER — OFFICE VISIT (OUTPATIENT)
Dept: FAMILY MEDICINE CLINIC | Age: 59
End: 2019-09-23
Payer: COMMERCIAL

## 2019-09-23 VITALS
BODY MASS INDEX: 33.47 KG/M2 | HEART RATE: 68 BPM | SYSTOLIC BLOOD PRESSURE: 128 MMHG | DIASTOLIC BLOOD PRESSURE: 74 MMHG | OXYGEN SATURATION: 97 % | WEIGHT: 198 LBS

## 2019-09-23 VITALS
DIASTOLIC BLOOD PRESSURE: 74 MMHG | SYSTOLIC BLOOD PRESSURE: 128 MMHG | WEIGHT: 198 LBS | HEART RATE: 68 BPM | BODY MASS INDEX: 33.47 KG/M2 | OXYGEN SATURATION: 97 %

## 2019-09-23 DIAGNOSIS — I10 ESSENTIAL HYPERTENSION: Primary | ICD-10-CM

## 2019-09-23 PROCEDURE — 99213 OFFICE O/P EST LOW 20 MIN: CPT | Performed by: FAMILY MEDICINE

## 2019-09-23 RX ORDER — POTASSIUM CHLORIDE 20 MEQ/1
20 TABLET, EXTENDED RELEASE ORAL DAILY
Qty: 90 TABLET | Refills: 3 | Status: SHIPPED | OUTPATIENT
Start: 2019-09-23 | End: 2021-07-06

## 2019-09-23 RX ORDER — LISINOPRIL AND HYDROCHLOROTHIAZIDE 12.5; 1 MG/1; MG/1
2 TABLET ORAL DAILY
Qty: 180 TABLET | Refills: 3 | Status: SHIPPED | OUTPATIENT
Start: 2019-09-23 | End: 2021-02-22

## 2020-05-15 ENCOUNTER — PATIENT MESSAGE (OUTPATIENT)
Dept: FAMILY MEDICINE CLINIC | Age: 60
End: 2020-05-15

## 2020-06-07 ENCOUNTER — HOSPITAL ENCOUNTER (OUTPATIENT)
Dept: CT IMAGING | Age: 60
Discharge: HOME | End: 2020-06-07
Payer: COMMERCIAL

## 2020-06-07 PROCEDURE — 71250 CT THORAX DX C-: CPT

## 2020-06-09 ENCOUNTER — TELEPHONE (OUTPATIENT)
Dept: FAMILY MEDICINE CLINIC | Age: 60
End: 2020-06-09

## 2020-06-09 ENCOUNTER — VIRTUAL VISIT (OUTPATIENT)
Dept: FAMILY MEDICINE CLINIC | Age: 60
End: 2020-06-09
Payer: COMMERCIAL

## 2020-06-09 PROCEDURE — 99213 OFFICE O/P EST LOW 20 MIN: CPT | Performed by: FAMILY MEDICINE

## 2020-06-09 ASSESSMENT — PATIENT HEALTH QUESTIONNAIRE - PHQ9
1. LITTLE INTEREST OR PLEASURE IN DOING THINGS: 0
SUM OF ALL RESPONSES TO PHQ QUESTIONS 1-9: 0
SUM OF ALL RESPONSES TO PHQ9 QUESTIONS 1 & 2: 0
2. FEELING DOWN, DEPRESSED OR HOPELESS: 0
SUM OF ALL RESPONSES TO PHQ QUESTIONS 1-9: 0

## 2020-06-09 NOTE — PROGRESS NOTES
Benjamen Romberg is a 60 y.o. female. HPI:  Due to the current coronavirus pandemic, this telephone/video visit was insisted, with patient's consent, to reduce the patient's risk of exposure to COVID-19 and provide continuity of care for an established patient. The patient was at home while the provider was either at home or at the clinic. Services were provided through a synchronous discussion over the telephone and/or video chat to substitute for in person clinic visit, and coded as such. Visit to review chest CT results   resukts reviewed in detail    Calcification coronary arteries , thyroid nodule   No worrisome lung nodules     Meds, vitamins and allergies reviewed with pt    Wt Readings from Last 3 Encounters:   09/23/19 198 lb (89.8 kg)   09/17/19 198 lb (89.8 kg)   03/12/19 184 lb (83.5 kg)       REVIEW OF SYSTEMS:   CONSTITUTIONAL: See history of present illness,   Weight noted   HEENT: No new vision difficulties or ringing in the ears. RESPIRATORY: No new SOB, PND, orthopnea or cough. CARDIOVASCULAR: no CP, palpitations or SOB with exertion  GI: No nausea, vomiting, diarrhea, constipation, abdominal pain or changes in bowel habits. : No urinary frequency, urgency, incontinence hematuria or dysuria. SKIN: No cyanosis or skin lesions. MUSCULOSKELETAL: No new muscle or joint pain. NEUROLOGICAL: No syncope or TIA-like symptoms. PSYCHIATRIC: No anxiety, insomnia or depression     Allergies   Allergen Reactions    Levaquin [Levofloxacin In D5w]     Morphine     Penicillins        Prior to Visit Medications    Medication Sig Taking?  Authorizing Provider   lisinopril-hydrochlorothiazide (PRINZIDE;ZESTORETIC) 10-12.5 MG per tablet Take 2 tablets by mouth daily Yes Kareen Sparks MD   potassium chloride (KLOR-CON M) 20 MEQ extended release tablet Take 1 tablet by mouth daily Yes Kareen Sparks MD       Past Medical History:   Diagnosis Date    Breast lump or mass     Gallstones     Hypertension        Social History     Tobacco Use    Smoking status: Former Smoker     Packs/day: 2.00     Years: 27.00     Pack years: 54.00     Types: Cigarettes     Start date: 1981     Last attempt to quit: 2007     Years since quittin.4    Smokeless tobacco: Never Used    Tobacco comment: spoke with the patient and updated smoking history   Substance Use Topics    Alcohol use: No     Alcohol/week: 0.0 standard drinks       Family History   Problem Relation Age of Onset    Cancer Mother     Cancer Father     Cancer Brother        OBJECTIVE:  There were no vitals taken for this visit. GEN:  alert and pleasant , in NAD  HEENT:  NCAT, EOM intact, no facial asymmetry,   NECK:  good range of motion  RR: in NAD over video, normal respiratory rate   ABD: Soft, NT per Pt self palpation  EXT: No rash or edema observed over video  NEURO: Alert oriented to person/place/date and time , normal mood and affect, able to follow commands ,  No focal changes over video     ASSESSMENT/PLAN:  1. Thyroid nodule  - CT CARDIAC CALCIUM SCORING; Future  - US HEAD NECK SOFT TISSUE THYROID; Future    2. Coronary artery calcification seen on CAT scan  - CT CARDIAC CALCIUM SCORING; Future  - Comprehensive Metabolic Panel, Fasting; Future  - Hemoglobin A1C; Future  - Lipid Panel; Future    3.  Encounter to discuss test results  Results discussed in detail    15 min spent on video visit with Pt

## 2020-06-20 ENCOUNTER — HOSPITAL ENCOUNTER (OUTPATIENT)
Dept: CT IMAGING | Age: 60
Discharge: HOME | End: 2020-06-20
Payer: COMMERCIAL

## 2020-06-20 ENCOUNTER — HOSPITAL ENCOUNTER (OUTPATIENT)
Dept: ULTRASOUND IMAGING | Age: 60
Discharge: HOME | End: 2020-06-20
Payer: COMMERCIAL

## 2020-06-20 PROCEDURE — 76536 US EXAM OF HEAD AND NECK: CPT

## 2020-06-20 PROCEDURE — 75571 CT HRT W/O DYE W/CA TEST: CPT

## 2020-06-25 ENCOUNTER — NURSE ONLY (OUTPATIENT)
Dept: FAMILY MEDICINE CLINIC | Age: 60
End: 2020-06-25
Payer: COMMERCIAL

## 2020-06-25 PROCEDURE — 36415 COLL VENOUS BLD VENIPUNCTURE: CPT | Performed by: FAMILY MEDICINE

## 2020-06-26 LAB
A/G RATIO: 1.7 (ref 1.1–2.2)
ALBUMIN SERPL-MCNC: 4.4 G/DL (ref 3.4–5)
ALP BLD-CCNC: 53 U/L (ref 40–129)
ALT SERPL-CCNC: 31 U/L (ref 10–40)
ANION GAP SERPL CALCULATED.3IONS-SCNC: 10 MMOL/L (ref 3–16)
AST SERPL-CCNC: 28 U/L (ref 15–37)
BILIRUB SERPL-MCNC: 0.4 MG/DL (ref 0–1)
BUN BLDV-MCNC: 23 MG/DL (ref 7–20)
CALCIUM SERPL-MCNC: 9.3 MG/DL (ref 8.3–10.6)
CHLORIDE BLD-SCNC: 102 MMOL/L (ref 99–110)
CHOLESTEROL, TOTAL: 213 MG/DL (ref 0–199)
CO2: 28 MMOL/L (ref 21–32)
CREAT SERPL-MCNC: <0.5 MG/DL (ref 0.6–1.2)
ESTIMATED AVERAGE GLUCOSE: 119.8 MG/DL
GFR AFRICAN AMERICAN: >60
GFR NON-AFRICAN AMERICAN: >60
GLOBULIN: 2.6 G/DL
GLUCOSE BLD-MCNC: 75 MG/DL (ref 70–99)
HBA1C MFR BLD: 5.8 %
HDLC SERPL-MCNC: 56 MG/DL (ref 40–60)
LDL CHOLESTEROL CALCULATED: 133 MG/DL
POTASSIUM SERPL-SCNC: 5.9 MMOL/L (ref 3.5–5.1)
SODIUM BLD-SCNC: 140 MMOL/L (ref 136–145)
T4 FREE: 1.1 NG/DL (ref 0.9–1.8)
TOTAL PROTEIN: 7 G/DL (ref 6.4–8.2)
TRIGL SERPL-MCNC: 118 MG/DL (ref 0–150)
TSH SERPL DL<=0.05 MIU/L-ACNC: 0.76 UIU/ML (ref 0.27–4.2)
VLDLC SERPL CALC-MCNC: 24 MG/DL

## 2020-07-01 NOTE — PROGRESS NOTES
Preeti Reese is a 60 y.o. female. HPI:  Here to discuss results   Elevated total and LDL cholesterol  A1c Has gone from 5.6 to 5.8, discussed importance of diet and exercise  Thyroid nodule needs biopsy    All results reviewed with patient in detail    Meds, vitamins and allergies reviewed with pt    Wt Readings from Last 3 Encounters:   20 204 lb (92.5 kg)   19 198 lb (89.8 kg)   19 198 lb (89.8 kg)       REVIEW OF SYSTEMS:   CONSTITUTIONAL: See history of present illness,   Weight noted   HEENT: No new vision difficulties or ringing in the ears. RESPIRATORY: No new SOB, PND, orthopnea or cough. CARDIOVASCULAR: no CP, palpitations or SOB with exertion  GI: No nausea, vomiting, diarrhea, constipation, abdominal pain or changes in bowel habits. : No urinary frequency, urgency, incontinence hematuria or dysuria. SKIN: No cyanosis or skin lesions. MUSCULOSKELETAL: No new muscle or joint pain. NEUROLOGICAL: No syncope or TIA-like symptoms. PSYCHIATRIC: No anxiety, insomnia or depression     Allergies   Allergen Reactions    Levaquin [Levofloxacin In D5w]     Morphine     Penicillins        Prior to Visit Medications    Medication Sig Taking?  Authorizing Provider   rosuvastatin (CRESTOR) 5 MG tablet Take 1 tablet by mouth nightly Yes Candy Costello MD   lisinopril-hydrochlorothiazide (PRINZIDE;ZESTORETIC) 10-12.5 MG per tablet Take 2 tablets by mouth daily Yes Candy Costello MD   potassium chloride (KLOR-CON M) 20 MEQ extended release tablet Take 1 tablet by mouth daily Yes Candy Costello MD       Past Medical History:   Diagnosis Date    Breast lump or mass     Gallstones     Hypertension        Social History     Tobacco Use    Smoking status: Former Smoker     Packs/day: 2.00     Years: 27.00     Pack years: 54.00     Types: Cigarettes     Start date: 1981     Last attempt to quit: 2007     Years since quittin.5    Smokeless tobacco: Never Used    Tobacco can tolerate  - rosuvastatin (CRESTOR) 5 MG tablet; Take 1 tablet by mouth nightly  Dispense: 30 tablet; Refill: 5  - Lipid Panel; Future  - Hepatic Function Panel;  Future      25 minutes spent with the pt face-to-face,  >50% spent reviewing test results and discussing plan of care and counseled on healthy diet, regular exercise, start Crestor, refer for thyroid nodule biopsy    Recheck labs 3 months

## 2020-07-02 ENCOUNTER — OFFICE VISIT (OUTPATIENT)
Dept: FAMILY MEDICINE CLINIC | Age: 60
End: 2020-07-02
Payer: COMMERCIAL

## 2020-07-02 VITALS
OXYGEN SATURATION: 96 % | HEART RATE: 78 BPM | TEMPERATURE: 97.2 F | HEIGHT: 64 IN | DIASTOLIC BLOOD PRESSURE: 82 MMHG | WEIGHT: 204 LBS | BODY MASS INDEX: 34.83 KG/M2 | SYSTOLIC BLOOD PRESSURE: 122 MMHG

## 2020-07-02 PROCEDURE — 99214 OFFICE O/P EST MOD 30 MIN: CPT | Performed by: FAMILY MEDICINE

## 2020-07-02 RX ORDER — ROSUVASTATIN CALCIUM 5 MG/1
5 TABLET, COATED ORAL NIGHTLY
Qty: 30 TABLET | Refills: 5 | Status: SHIPPED | OUTPATIENT
Start: 2020-07-02 | End: 2021-08-26

## 2020-08-13 ENCOUNTER — OFFICE VISIT (OUTPATIENT)
Dept: ENDOCRINOLOGY | Age: 60
End: 2020-08-13
Payer: COMMERCIAL

## 2020-08-13 VITALS
BODY MASS INDEX: 35.41 KG/M2 | WEIGHT: 207.4 LBS | SYSTOLIC BLOOD PRESSURE: 143 MMHG | HEART RATE: 85 BPM | DIASTOLIC BLOOD PRESSURE: 90 MMHG | OXYGEN SATURATION: 97 % | HEIGHT: 64 IN

## 2020-08-13 PROCEDURE — 99244 OFF/OP CNSLTJ NEW/EST MOD 40: CPT | Performed by: INTERNAL MEDICINE

## 2020-08-13 RX ORDER — ASPIRIN 81 MG/1
81 TABLET ORAL DAILY
COMMUNITY

## 2020-08-13 ASSESSMENT — ENCOUNTER SYMPTOMS
BACK PAIN: 0
PHOTOPHOBIA: 0
COUGH: 0

## 2020-08-13 NOTE — PROGRESS NOTES
Endocrinology       New Patient Consultation  Damian Ramos M.D. Phone: [phone]   FAX: [phone]   YOB: 1960    Date of Visit:  8/13/2020    Allergies   Allergen Reactions    Levaquin [Levofloxacin In D5w]     Morphine     Penicillins      Outpatient Medications Marked as Taking for the 8/13/20 encounter (Office Visit) with Damion Rehman MD   Medication Sig Dispense Refill    Turmeric 1053 MG TABS Take by mouth daily      aspirin 81 MG EC tablet Take 81 mg by mouth daily      rosuvastatin (CRESTOR) 5 MG tablet Take 1 tablet by mouth nightly 30 tablet 5    lisinopril-hydrochlorothiazide (PRINZIDE;ZESTORETIC) 10-12.5 MG per tablet Take 2 tablets by mouth daily 180 tablet 3    potassium chloride (KLOR-CON M) 20 MEQ extended release tablet Take 1 tablet by mouth daily 90 tablet 3         Vitals:    08/13/20 1304 08/13/20 1306   BP: (!) 157/83 (!) 143/90   Site: Left Upper Arm Right Upper Arm   Position: Sitting Sitting   Cuff Size: Medium Adult Medium Adult   Pulse: 85    SpO2: 97%    Weight: 207 lb 6.4 oz (94.1 kg)    Height: 5' 4.49\" (1.638 m)      Body mass index is 35.06 kg/m².      Wt Readings from Last 3 Encounters:   08/13/20 207 lb 6.4 oz (94.1 kg)   07/02/20 204 lb (92.5 kg)   09/23/19 198 lb (89.8 kg)     BP Readings from Last 3 Encounters:   08/13/20 (!) 143/90   07/02/20 122/82   09/23/19 128/74        Past Medical History:   Diagnosis Date    Breast lump or mass     Gallstones     Hypertension      Past Surgical History:   Procedure Laterality Date    BREAST SURGERY  [id number]    CHOLECYSTECTOMY, LAPAROSCOPIC  02/03/2017    COLONOSCOPY  8.1.2010    CYSTOSCOPY  7.19.2007    HYSTERECTOMY  2007     Family History   Problem Relation Age of Onset    Cancer Mother     Cancer Father     Cancer Brother      Social History     Tobacco Use   Smoking Status Former Smoker    Packs/day: 2.00    Years: 27.00    Pack years: 54.00    Types: Cigarettes    Start date: 1981    Last attempt to quit: 2007    Years since quittin.6   Smokeless Tobacco Never Used   Tobacco Comment    spoke with the patient and updated smoking history      Social History     Substance and Sexual Activity   Alcohol Use No    Alcohol/week: 0.0 standard drinks       HPI    Haider Jett is a 63 y.o. female who is here for initial evaluation of thyroid nodule. Patient is being seen at the request of  Dedrick Abreu MD     Patient has a PMH of HTN, breast lump, hyperlipidemia. Patient had Thyroid US done in   which showed nodules     In right lobe , dominant nodule measures 1.2 cm solid . In left lobe , dominant nodule measures 2.8 cm in mid pole and 1.8 cm in lower pole. Occasional difficulty swallowing. Sister has h/o multinodular goiter and had recent surgery. No FH of thyroid cancer  No History of exposure to radiation as a child. She has strong FH of DM    Her last A1c was 5.8 %     She has tried different diets . Unable to stay on diet long term. Review of Systems   Constitutional: Negative for chills, diaphoresis and fever. Eyes: Negative for photophobia. Respiratory: Negative for cough. Cardiovascular: Negative for chest pain and palpitations. Endocrine: Negative for polydipsia. Genitourinary: Negative for dysuria, flank pain, frequency, hematuria and urgency. Musculoskeletal: Negative for back pain, myalgias and neck pain. Skin: Negative for rash. Allergic/Immunologic: Negative for environmental allergies. Neurological: Negative for dizziness, tremors, seizures and headaches. Hematological: Does not bruise/bleed easily. Psychiatric/Behavioral: Negative for hallucinations and suicidal ideas. The patient is not nervous/anxious. Physical Exam  Constitutional:       General: She is not in acute distress. Appearance: She is well-developed.  She is not diaphoretic. Neck:      Thyroid: No thyromegaly. Cardiovascular:      Rate and Rhythm: Normal rate. Heart sounds: Normal heart sounds. Pulmonary:      Effort: Pulmonary effort is normal. No respiratory distress. Breath sounds: No wheezing. Abdominal:      General: Bowel sounds are normal.      Palpations: Abdomen is soft. Tenderness: There is no abdominal tenderness. Skin:     General: Skin is warm and dry. Neurological:      Mental Status: She is alert and oriented to person, place, and time. Psychiatric:         Behavior: Behavior normal.         Thought Content: Thought content normal.       EXAMINATION:    THYROID ULTRASOUND         6/20/2020         COMPARISON:    None         HISTORY:    ORDERING SYSTEM PROVIDED HISTORY: Thyroid nodule         FINDINGS:    Right thyroid lobe:  4.8 x 2.3 x 2.1 cm         Left thyroid lobe:  5.3 x 3.0 x 2.4 cm         Isthmus:  6 mm         Thyroid Gland:  Mild heterogeneity of the thyroid parenchyma.  No significant    hyperemia.         Nodules: There are multiple bilateral thyroid nodules.  Many of the smaller    nodules particularly on the right have the appearance of colloid cysts.  This    includes a lower pole nodule on the right measuring 1.2 x 0.7 x 0.9 cm with    punctate calcification.         NODULE: Right 1         Size: 12 x 7 x 9 mm         Location: Upper pole         1. Composition:  Almost completely solid (2)         2. Echogenicity:  Isoechoic (1)         3. Shape:  Wider-than-tall (0)         4. Margins:  Smooth (0)         5. Echogenic foci:  None (0)         ACR TI-RADS total points:  3         ACR TI-RADS risk category: TR3         Prior biopsy: Unknown.         Significant growth:  No.         Change in features:  No.         Change in ACR TI-RADS risk category:  No.         NODULE: Left 1         Size: 28 x 28 x 22 mm         Location: Mid to lower pole         1.  Composition:  Solid (2)         2. Echogenicity:  Hypoechoic nodules on follow-up US in 12 months. She ll get FNA done at [facility].        2. Hyperlipidemia    On crestor 5 mg daily. Managed by PCP. 3. HTN   High BP. She is nervous as it was her first visit. Follow-up with PCP. 4. Pre-diabetes . Discussed diet changes.

## 2020-08-21 ENCOUNTER — HOSPITAL ENCOUNTER (OUTPATIENT)
Dept: ULTRASOUND IMAGING | Age: 60
Discharge: HOME | End: 2020-08-21
Payer: COMMERCIAL

## 2020-08-21 VITALS
DIASTOLIC BLOOD PRESSURE: 77 MMHG | HEIGHT: 62 IN | WEIGHT: 205 LBS | TEMPERATURE: 97.4 F | HEART RATE: 66 BPM | OXYGEN SATURATION: 96 % | BODY MASS INDEX: 37.73 KG/M2 | SYSTOLIC BLOOD PRESSURE: 167 MMHG | RESPIRATION RATE: 16 BRPM

## 2020-08-21 PROCEDURE — 88173 CYTOPATH EVAL FNA REPORT: CPT

## 2020-08-21 PROCEDURE — 2500000003 HC RX 250 WO HCPCS: Performed by: INTERNAL MEDICINE

## 2020-08-21 PROCEDURE — 88305 TISSUE EXAM BY PATHOLOGIST: CPT

## 2020-08-21 PROCEDURE — 10005 FNA BX W/US GDN 1ST LES: CPT

## 2020-08-21 RX ORDER — LIDOCAINE HYDROCHLORIDE 10 MG/ML
5 INJECTION, SOLUTION EPIDURAL; INFILTRATION; INTRACAUDAL; PERINEURAL ONCE
Status: COMPLETED | OUTPATIENT
Start: 2020-08-21 | End: 2020-08-21

## 2020-08-21 RX ADMIN — LIDOCAINE HYDROCHLORIDE 5 ML: 10 INJECTION, SOLUTION EPIDURAL; INFILTRATION; INTRACAUDAL; PERINEURAL at 08:50

## 2020-08-24 ENCOUNTER — TELEPHONE (OUTPATIENT)
Dept: ENDOCRINOLOGY | Age: 60
End: 2020-08-24

## 2021-02-22 RX ORDER — LISINOPRIL AND HYDROCHLOROTHIAZIDE 12.5; 1 MG/1; MG/1
TABLET ORAL
Qty: 180 TABLET | Refills: 1 | Status: SHIPPED | OUTPATIENT
Start: 2021-02-22 | End: 2021-07-06 | Stop reason: SDUPTHER

## 2021-02-22 NOTE — TELEPHONE ENCOUNTER
Medication:   Requested Prescriptions     Pending Prescriptions Disp Refills    lisinopril-hydroCHLOROthiazide (PRINZIDE;ZESTORETIC) 10-12.5 MG per tablet [Pharmacy Med Name: Lisinopril-hydroCHLOROthiazide 10-12.5 MG Oral Tablet] 180 tablet 0     Sig: Take 2 tablets by mouth once daily       Last Filled:  09/23/2019 #180 3rf    Patient Phone Number: [phone] (home)     Last appt: 7/2/2020   Next appt: Visit date not found    Lab Results   Component Value Date     06/25/2020    K 5.9 (H) 06/25/2020     06/25/2020    CO2 28 06/25/2020    BUN 23 (H) 06/25/2020    CREATININE <0.5 (L) 06/25/2020    GLUCOSE 75 06/25/2020    CALCIUM 9.3 06/25/2020    PROT 7.0 06/25/2020    LABALBU 4.4 06/25/2020    BILITOT 0.4 06/25/2020    ALKPHOS 53 06/25/2020    AST 28 06/25/2020    ALT 31 06/25/2020    LABGLOM >60 06/25/2020    GFRAA >60 06/25/2020    AGRATIO 1.7 06/25/2020    GLOB 2.6 06/25/2020

## 2021-04-20 ENCOUNTER — TELEPHONE (OUTPATIENT)
Dept: FAMILY MEDICINE CLINIC | Age: 61
End: 2021-04-20

## 2021-06-11 ENCOUNTER — PATIENT MESSAGE (OUTPATIENT)
Dept: FAMILY MEDICINE CLINIC | Age: 61
End: 2021-06-11

## 2021-06-11 DIAGNOSIS — Z12.11 COLON CANCER SCREENING: Primary | ICD-10-CM

## 2021-06-11 NOTE — TELEPHONE ENCOUNTER
From: Brianda Gunderson  To: Demarco Godinez MD  Sent: 6/11/2021 10:34 AM EDT  Subject: Non-Urgent Medical Question    I have scheduled my yearly exam for July. I am getting past due notices for my LD cat scan of lungs-and also my colonoscopy-so I need updated orders. Also Dr. Chana Jackman has moved and I need a follow up thyroid ultrasound for my multinodular thyroid. Should I see another endocrinologist or can Dr. Toy Kaplan order the scan and discuss results with me? For my yearly exam, should I come in early to get blood work? Thanks!   [address]

## 2021-06-29 DIAGNOSIS — I10 ESSENTIAL HYPERTENSION: ICD-10-CM

## 2021-06-29 DIAGNOSIS — E78.2 MIXED HYPERLIPIDEMIA: Primary | ICD-10-CM

## 2021-07-02 DIAGNOSIS — I10 ESSENTIAL HYPERTENSION: ICD-10-CM

## 2021-07-02 DIAGNOSIS — E78.2 MIXED HYPERLIPIDEMIA: ICD-10-CM

## 2021-07-02 LAB
BASOPHILS ABSOLUTE: 0.1 K/UL (ref 0–0.2)
BASOPHILS RELATIVE PERCENT: 0.8 %
EOSINOPHILS ABSOLUTE: 0.2 K/UL (ref 0–0.6)
EOSINOPHILS RELATIVE PERCENT: 2.6 %
ESTIMATED AVERAGE GLUCOSE: 122.6 MG/DL
HBA1C MFR BLD: 5.9 %
HCT VFR BLD CALC: 39.7 % (ref 36–48)
HEMOGLOBIN: 13.3 G/DL (ref 12–16)
LYMPHOCYTES ABSOLUTE: 2.2 K/UL (ref 1–5.1)
LYMPHOCYTES RELATIVE PERCENT: 33.5 %
MCH RBC QN AUTO: 28.3 PG (ref 26–34)
MCHC RBC AUTO-ENTMCNC: 33.5 G/DL (ref 31–36)
MCV RBC AUTO: 84.5 FL (ref 80–100)
MONOCYTES ABSOLUTE: 0.5 K/UL (ref 0–1.3)
MONOCYTES RELATIVE PERCENT: 7.8 %
NEUTROPHILS ABSOLUTE: 3.7 K/UL (ref 1.7–7.7)
NEUTROPHILS RELATIVE PERCENT: 55.3 %
PDW BLD-RTO: 13.6 % (ref 12.4–15.4)
PLATELET # BLD: 264 K/UL (ref 135–450)
PMV BLD AUTO: 9.1 FL (ref 5–10.5)
RBC # BLD: 4.7 M/UL (ref 4–5.2)
T4 FREE: 1.2 NG/DL (ref 0.9–1.8)
TSH REFLEX: 0.12 UIU/ML (ref 0.27–4.2)
WBC # BLD: 6.6 K/UL (ref 4–11)

## 2021-07-03 LAB
A/G RATIO: 1.7 (ref 1.1–2.2)
ALBUMIN SERPL-MCNC: 4.2 G/DL (ref 3.4–5)
ALP BLD-CCNC: 54 U/L (ref 40–129)
ALT SERPL-CCNC: 19 U/L (ref 10–40)
ANION GAP SERPL CALCULATED.3IONS-SCNC: 11 MMOL/L (ref 3–16)
AST SERPL-CCNC: 17 U/L (ref 15–37)
BILIRUB SERPL-MCNC: 0.4 MG/DL (ref 0–1)
BUN BLDV-MCNC: 26 MG/DL (ref 7–20)
CALCIUM SERPL-MCNC: 9.3 MG/DL (ref 8.3–10.6)
CHLORIDE BLD-SCNC: 107 MMOL/L (ref 99–110)
CHOLESTEROL, TOTAL: 204 MG/DL (ref 0–199)
CO2: 24 MMOL/L (ref 21–32)
CREAT SERPL-MCNC: <0.5 MG/DL (ref 0.6–1.2)
GFR AFRICAN AMERICAN: >60
GFR NON-AFRICAN AMERICAN: >60
GLOBULIN: 2.5 G/DL
GLUCOSE FASTING: 102 MG/DL (ref 70–99)
HDLC SERPL-MCNC: 57 MG/DL (ref 40–60)
LDL CHOLESTEROL CALCULATED: 132 MG/DL
POTASSIUM SERPL-SCNC: 3.7 MMOL/L (ref 3.5–5.1)
SODIUM BLD-SCNC: 142 MMOL/L (ref 136–145)
T3 TOTAL: 1.64 NG/ML (ref 0.8–2)
TOTAL PROTEIN: 6.7 G/DL (ref 6.4–8.2)
TRIGL SERPL-MCNC: 77 MG/DL (ref 0–150)
VLDLC SERPL CALC-MCNC: 15 MG/DL

## 2021-07-04 NOTE — PROGRESS NOTES
Social History     Socioeconomic History    Marital status:      Spouse name: None    Number of children: None    Years of education: None    Highest education level: None   Occupational History    None   Tobacco Use    Smoking status: Former Smoker     Packs/day: 2.00     Years: 27.00     Pack years: 54.00     Types: Cigarettes     Start date: 1981     Quit date: 2007     Years since quittin.5    Smokeless tobacco: Never Used    Tobacco comment: spoke with the patient and updated smoking history   Vaping Use    Vaping Use: Never used   Substance and Sexual Activity    Alcohol use: No     Alcohol/week: 0.0 standard drinks    Drug use: None    Sexual activity: Yes     Partners: Male   Other Topics Concern    None   Social History Narrative    None     Social Determinants of Health     Financial Resource Strain: Low Risk     Difficulty of Paying Living Expenses: Not hard at all   Food Insecurity: No Food Insecurity    Worried About Running Out of Food in the Last Year: Never true    Radha of Food in the Last Year: Never true   Transportation Needs: No Transportation Needs    Lack of Transportation (Medical): No    Lack of Transportation (Non-Medical):  No   Physical Activity:     Days of Exercise per Week:     Minutes of Exercise per Session:    Stress:     Feeling of Stress :    Social Connections:     Frequency of Communication with Friends and Family:     Frequency of Social Gatherings with Friends and Family:     Attends [employer] Services:     Active Member of Clubs or Organizations:     Attends Club or Organization Meetings:     Marital Status:    Intimate Partner Violence:     Fear of Current or Ex-Partner:     Emotionally Abused:     Physically Abused:     Sexually Abused:      Family History   Problem Relation Age of Onset    Cancer Mother 52        UTERINE AND LUNG, SMOKER    Cancer Father 64        THROAT CANCER    Cancer Brother 48        THROAT REVIEW OF SYSTEMS:   CONSTITUTIONAL: No major weight gain or loss, fatigue, weakness, night sweats or fever. HEENT: No new vision difficulties or ringing in the ears. RESPIRATORY: No new SOB, PND, orthopnea or cough. CARDIOVASCULAR: no CP, palpitations or SOB with exertion  GI: No nausea, vomiting, diarrhea, constipation, abdominal pain or changes in bowel habits. : No urinary frequency, urgency, incontinence hematuria or dysuria. SKIN: No cyanosis or skin lesions. MUSCULOSKELETAL: No new muscle or joint pain. NEUROLOGICAL: No syncope or TIA-like symptoms. PSYCHIATRIC: No anxiety, insomnia or depression     PHYSICAL EXAMINATION:  /88   Pulse 66   Temp 97 °F (36.1 °C)   Ht 5' 4.25\" (1.632 m)   Wt 205 lb (93 kg)   SpO2 97%   BMI 34.91 kg/m²   General appearance: BMI noted, alert, no distress  Skin: Skin color, texture, turgor normal. No rashes or lesions. Head: Normocephalic. No masses, lesions, tenderness or abnormalities  Eyes: conjunctivae/corneas clear. PERRL, EOM's intact. Ears: External ears normal. Canals clear. TM's clear bilaterally. Nose/Sinuses: Not examined due to Covid/mask  Oropharynx: Not examined due to Covid/mask  Neck: Neck supple, and symmetric. No adenopathy. Thyroid symmetric, normal size, without nodule. Trachea is midline. No bruits. Back: Back symmetric, no curvature. ROM normal. No CVA tenderness. Lungs: Lungs clear to auscultation bilaterally. No retractions or use of accessory muscles. Heart: Regular rate and rhythm, with no rub, murmur or gallop noted. Breasts: no masses, skin changes or DC  Abdomen: Abdomen soft, obese, non-tender. BS normal. No masses, organomegaly. No hernia noted. Extremities: Extremities normal. No deformities, edema, or skin discoloration. No cyanosis or clubbing noted to the nails. Lymph: No lymphadenopathy of the neck or supraclavicular regions.   Musculoskeletal: Spine ROM normal. Muscular strength intact. Peripheral pulses: radial=4/4, femoral=4/4, dorsalis pedis=4/4,  Neuro: Cranial nerves intact, Gait normal. Reflexes normal and symmetric, with no pathologic reflex noted. No focal weakness. Normal sensation to light touch. Pelvic/Rectal: Exam deferred to OB/GYN    LABS = REVIEWED WITH Pt     ASSESSMENT/PLAN:  1. Physical exam, routine  Healthy diet and more exercise    2. Essential hypertension  Elevated, take blood pressure medicine twice daily and check blood pressure at home and report  - Basic Metabolic Panel; Future    3. Mixed hyperlipidemia  Due for lab  - Lipid Panel; Future  - Hemoglobin A1C; Future  - Basic Metabolic Panel; Future    4. Ex-smoker  Screen  - CT CHEST WO CONTRAST; Future    5. BMI 34.0-34.9,adult  Healthy  diet,  regular daily exercise/150 min per week , avoid fast food, pop, juice and eating after dinner  Drink plenty of water daily    6.   Thyroid nodule  Repeat ultrasound, labs noted

## 2021-07-06 ENCOUNTER — OFFICE VISIT (OUTPATIENT)
Dept: FAMILY MEDICINE CLINIC | Age: 61
End: 2021-07-06
Payer: COMMERCIAL

## 2021-07-06 VITALS
BODY MASS INDEX: 35 KG/M2 | DIASTOLIC BLOOD PRESSURE: 88 MMHG | WEIGHT: 205 LBS | OXYGEN SATURATION: 97 % | SYSTOLIC BLOOD PRESSURE: 138 MMHG | HEIGHT: 64 IN | TEMPERATURE: 97 F | HEART RATE: 66 BPM

## 2021-07-06 DIAGNOSIS — E78.2 MIXED HYPERLIPIDEMIA: ICD-10-CM

## 2021-07-06 DIAGNOSIS — I10 ESSENTIAL HYPERTENSION: ICD-10-CM

## 2021-07-06 DIAGNOSIS — Z00.00 PHYSICAL EXAM, ROUTINE: Primary | ICD-10-CM

## 2021-07-06 DIAGNOSIS — Z87.891 EX-SMOKER: ICD-10-CM

## 2021-07-06 DIAGNOSIS — E04.2 MULTINODULAR GOITER: ICD-10-CM

## 2021-07-06 PROCEDURE — 99396 PREV VISIT EST AGE 40-64: CPT | Performed by: FAMILY MEDICINE

## 2021-07-06 RX ORDER — LISINOPRIL AND HYDROCHLOROTHIAZIDE 12.5; 1 MG/1; MG/1
1 TABLET ORAL 2 TIMES DAILY
Qty: 180 TABLET | Refills: 3 | Status: ON HOLD | OUTPATIENT
Start: 2021-07-06 | End: 2021-11-16 | Stop reason: HOSPADM

## 2021-07-06 SDOH — ECONOMIC STABILITY: FOOD INSECURITY: WITHIN THE PAST 12 MONTHS, THE FOOD YOU BOUGHT JUST DIDN'T LAST AND YOU DIDN'T HAVE MONEY TO GET MORE.: NEVER TRUE

## 2021-07-06 SDOH — ECONOMIC STABILITY: TRANSPORTATION INSECURITY
IN THE PAST 12 MONTHS, HAS LACK OF TRANSPORTATION KEPT YOU FROM MEETINGS, WORK, OR FROM GETTING THINGS NEEDED FOR DAILY LIVING?: NO

## 2021-07-06 SDOH — ECONOMIC STABILITY: FOOD INSECURITY: WITHIN THE PAST 12 MONTHS, YOU WORRIED THAT YOUR FOOD WOULD RUN OUT BEFORE YOU GOT MONEY TO BUY MORE.: NEVER TRUE

## 2021-07-06 SDOH — ECONOMIC STABILITY: TRANSPORTATION INSECURITY
IN THE PAST 12 MONTHS, HAS THE LACK OF TRANSPORTATION KEPT YOU FROM MEDICAL APPOINTMENTS OR FROM GETTING MEDICATIONS?: NO

## 2021-07-06 ASSESSMENT — PATIENT HEALTH QUESTIONNAIRE - PHQ9
2. FEELING DOWN, DEPRESSED OR HOPELESS: 0
SUM OF ALL RESPONSES TO PHQ QUESTIONS 1-9: 0
SUM OF ALL RESPONSES TO PHQ QUESTIONS 1-9: 0
SUM OF ALL RESPONSES TO PHQ9 QUESTIONS 1 & 2: 0
SUM OF ALL RESPONSES TO PHQ QUESTIONS 1-9: 0
1. LITTLE INTEREST OR PLEASURE IN DOING THINGS: 0

## 2021-07-06 ASSESSMENT — SOCIAL DETERMINANTS OF HEALTH (SDOH): HOW HARD IS IT FOR YOU TO PAY FOR THE VERY BASICS LIKE FOOD, HOUSING, MEDICAL CARE, AND HEATING?: NOT HARD AT ALL

## 2021-08-14 ENCOUNTER — HOSPITAL ENCOUNTER (OUTPATIENT)
Dept: CT IMAGING | Age: 61
Discharge: HOME | End: 2021-08-14
Payer: COMMERCIAL

## 2021-08-14 ENCOUNTER — HOSPITAL ENCOUNTER (OUTPATIENT)
Dept: ULTRASOUND IMAGING | Age: 61
Discharge: HOME | End: 2021-08-14
Payer: COMMERCIAL

## 2021-08-14 DIAGNOSIS — Z87.891 EX-SMOKER: ICD-10-CM

## 2021-08-14 DIAGNOSIS — E04.2 MULTIPLE THYROID NODULES: ICD-10-CM

## 2021-08-14 PROCEDURE — 76536 US EXAM OF HEAD AND NECK: CPT

## 2021-08-14 PROCEDURE — 71250 CT THORAX DX C-: CPT

## 2021-08-19 ENCOUNTER — VIRTUAL VISIT (OUTPATIENT)
Dept: FAMILY MEDICINE CLINIC | Age: 61
End: 2021-08-19
Payer: COMMERCIAL

## 2021-08-19 DIAGNOSIS — I27.20 PULMONARY HTN (HCC): ICD-10-CM

## 2021-08-19 DIAGNOSIS — I10 ESSENTIAL HYPERTENSION: Primary | ICD-10-CM

## 2021-08-19 DIAGNOSIS — E78.2 MIXED HYPERLIPIDEMIA: ICD-10-CM

## 2021-08-19 DIAGNOSIS — E04.2 MULTINODULAR GOITER: ICD-10-CM

## 2021-08-19 DIAGNOSIS — Z13.79 GENETIC SCREENING: ICD-10-CM

## 2021-08-19 PROCEDURE — 99214 OFFICE O/P EST MOD 30 MIN: CPT | Performed by: FAMILY MEDICINE

## 2021-08-19 RX ORDER — POTASSIUM CHLORIDE 750 MG/1
10 TABLET, EXTENDED RELEASE ORAL DAILY
Qty: 90 TABLET | Refills: 3 | Status: SHIPPED | OUTPATIENT
Start: 2021-08-19 | End: 2022-10-18

## 2021-08-19 NOTE — PROGRESS NOTES
by mouth 2 times daily Yes Davy Young MD   aspirin 81 MG EC tablet Take 81 mg by mouth daily Yes Historical Provider, MD   rosuvastatin (CRESTOR) 5 MG tablet Take 1 tablet by mouth nightly Yes Davy Young MD   Multiple Vitamins-Minerals (VITAMIN D3 COMPLETE PO) Take by mouth  Historical Provider, MD       Past Medical History:   Diagnosis Date    Breast lump or mass     Gallstones     Hypertension        Social History     Tobacco Use    Smoking status: Former Smoker     Packs/day: 2.00     Years: 27.00     Pack years: 54.00     Types: Cigarettes     Start date: 1981     Quit date: 2007     Years since quittin.6    Smokeless tobacco: Never Used    Tobacco comment: spoke with the patient and updated smoking history   Substance Use Topics    Alcohol use: No     Alcohol/week: 0.0 standard drinks       Family History   Problem Relation Age of Onset    Cancer Mother 52        UTERINE AND LUNG, SMOKER    Cancer Father 64        THROAT CANCER    Cancer Brother 48        THROAT       OBJECTIVE:  There were no vitals taken for this visit. GEN:  alert and pleasant , in NAD  HEENT:  NCAT, EOM intact, no facial asymmetry,   NECK:  good range of motion  RR: in NAD over video, normal respiratory rate   EXT: No rash or edema observed over video  NEURO: Alert oriented to person/place/date and time , normal mood and affect, able to follow commands ,  No focal changes over video     ASSESSMENT/PLAN:  1. Essential hypertension  Continue diet and exercise  Check blood pressure at home and send results to Dr. Kimberlee Hunter in the next week to 2 weeks    Continue lisinopril HCT twice daily, add potassium daily  If still with muscle cramps, change afternoon blood pressure med to [facility]  Have her see Dr. Farideh Ureña in light of chest CT and enlarged pulmonary arteries  Possible pulmonary artery hypertension    2. Multinodular goiter  Follow-up with Endo    3.  Mixed hyperlipidemia  Diet and exercise, consider Livalo with next blood panel is not improved    4.  Pulmonary HTN [facility])  Referral  - Yudelka Camarillo MD, [facility], [facility]      30 Total Minutes spent pre charting (reviewing problem list, reviewing health maintenance items , meds, any test results, consultant and hospital notes ) and  obtaining present visit history, performing appropriate medical exam/evaluation, counseling and educating the patient (and family), ordering medications ,tests, and procedures as needed, refilling medication(s), placing referral(s) when needed in addition to coordinating care for this patient and documenting in electronic health record

## 2021-08-23 ENCOUNTER — TELEPHONE (OUTPATIENT)
Dept: CARDIOLOGY CLINIC | Age: 61
End: 2021-08-23

## 2021-08-23 DIAGNOSIS — R06.02 SOB (SHORTNESS OF BREATH): Primary | ICD-10-CM

## 2021-08-23 DIAGNOSIS — I27.21 PAH (PULMONARY ARTERY HYPERTENSION) (HCC): ICD-10-CM

## 2021-08-23 NOTE — TELEPHONE ENCOUNTER
She was referred to [facility] by pcp 8/19/21 . She called to make an appt and was told the soonest she could see terence was October. Could [facility] review her testing and tell her if it is ok for her to wait until October ?

## 2021-08-24 NOTE — TELEPHONE ENCOUNTER
This can wait until October. She needs to have an echo to evaluate for PAH at the same time.   ESTEFANIA

## 2021-08-26 ENCOUNTER — OFFICE VISIT (OUTPATIENT)
Dept: ENDOCRINOLOGY | Age: 61
End: 2021-08-26
Payer: COMMERCIAL

## 2021-08-26 VITALS
DIASTOLIC BLOOD PRESSURE: 70 MMHG | BODY MASS INDEX: 33.8 KG/M2 | HEART RATE: 60 BPM | WEIGHT: 198 LBS | RESPIRATION RATE: 16 BRPM | HEIGHT: 64 IN | SYSTOLIC BLOOD PRESSURE: 134 MMHG

## 2021-08-26 DIAGNOSIS — E04.2 MULTINODULAR GOITER: ICD-10-CM

## 2021-08-26 DIAGNOSIS — E04.1 THYROID NODULE: Primary | ICD-10-CM

## 2021-08-26 DIAGNOSIS — E78.2 MIXED HYPERLIPIDEMIA: ICD-10-CM

## 2021-08-26 PROCEDURE — 99214 OFFICE O/P EST MOD 30 MIN: CPT | Performed by: INTERNAL MEDICINE

## 2021-08-26 NOTE — PROGRESS NOTES
Pt new to me previously saw Dr Bia Grant     APRIL Michel is a 57 y.o. female who is here for initial evaluation of thyroid nodule. Patient has a PMH of HTN, breast lump, hyperlipidemia. Patient had Thyroid US done in  06/20 which showed nodules   In right lobe , dominant nodule measures 1.2 cm solid . In left lobe , dominant nodule measures 2.8 cm in mid pole and 1.8 cm in lower pole. Occasional difficulty swallowing. Sister has h/o multinodular goiter and had recent surgery. No FH of thyroid cancer  No History of exposure to radiation as a child. She has strong FH of DM  She has tried different diets . Unable to stay on diet long term. INTERIM    she perhaps August 2020 retrospect  Had a thyroid ultrasound August 2021  Had thyroid function test done recently  Complains of occasional palpitation  She is having a lot of medical work-up done so does not want to do a biopsy currently      Allergies   Allergen Reactions    Levaquin [Levofloxacin In D5w]     Morphine     Penicillins      Outpatient Medications Marked as Taking for the 8/26/21 encounter (Office Visit) with Jenna Gonzalez MD   Medication Sig Dispense Refill    APPLE CIDER VINEGAR PO Take by mouth      potassium chloride (KLOR-CON M) 10 MEQ extended release tablet Take 1 tablet by mouth daily 90 tablet 3    lisinopril-hydroCHLOROthiazide (PRINZIDE;ZESTORETIC) 10-12.5 MG per tablet Take 1 tablet by mouth 2 times daily 180 tablet 3    aspirin 81 MG EC tablet Take 81 mg by mouth daily           Vitals:    08/26/21 0904   BP: 134/70   Pulse: 60   Resp: 16   Weight: 198 lb (89.8 kg)   Height: 5' 4.25\" (1.632 m)     Body mass index is 33.72 kg/m².      Wt Readings from Last 3 Encounters:   08/26/21 198 lb (89.8 kg)   07/06/21 205 lb (93 kg)   08/21/20 205 lb (93 kg)     BP Readings from Last 3 Encounters:   08/26/21 134/70   07/06/21 138/88   08/21/20 (!) 167/77        Past Medical History:   Diagnosis      Significant growth:  No.         Change in features:  No.         Change in ACR TI-RADS risk category:  No.         NODULE: Left 1         Size: 28 x 28 x 22 mm         Location: Mid to lower pole         1. Composition:  Solid (2)         2. Echogenicity:  Hypoechoic (2)         3. Shape:  Wider-than-tall (0)         4. Margins:  Smooth (0)         5. Echogenic foci:  None (0)         ACR TI-RADS total points:  4         ACR TI-RADS risk category: TR4         Prior biopsy: Unknown.         Significant growth:  No.         Change in features:  No.         Change in ACR TI-RADS risk category:  No.         NODULE: Left 2         Size: 17 x 5 x 8 mm         Location: Middle pole         1. Composition:  Solid (2)         2. Echogenicity:  Hypoechoic (2)         3. Shape:  Wider-than-tall (0)         4. Margins:  Smooth (0)         5. Echogenic foci:  None (0)         ACR TI-RADS total points:  4         ACR TI-RADS risk category: TR4         Prior biopsy: Unknown.         Significant growth:  No.         Change in features:  No.         Change in ACR TI-RADS risk category:  No.         Cervical lymphadenopathy: No abnormal lymph nodes in the imaged portions of    the neck.              Impression    Multinodular thyroid gland.  Many of the smaller nodules, not listed above,    are consistent with colloid cysts.  Per ACR criteria, FNA of the dominant mid    to lower pole left thyroid nodule is indicated.  Continued surveillance of    the remainder of the findings with repeat ultrasound in 1 year.         NODULE right 1: ACR TI-RADS TR3:         Recommend:  No follow-up.         NODULE left 1: ACR TI-RADS TR4:         Recommend:  Ultrasound-guided fine needle aspiration.         NODULE left 2: ACR TI-RADS TR4:         Recommend:  Follow-up ultrasound in 1 year. KAILA  I have reviewed the review of system questionnaire filled by the patient .   Patient was advised to contact PCP for non endocrine signs and symptoms     EXAM   Constitutional: no acute distress, well appearing, well nourished  Psychiatric: oriented to person, place and time, judgement, insight and normal, recent and remote memory and intact and mood, affect are normal  Skin: skin and subcutaneous tissue is normal without mass,   Head and Face: examination of head and face revealed no abnormalities  Eyes: no lid or conjunctival swelling, no erythema or discharge, pupils are normal,   Ears/Nose: external inspection of ears and nose revealed no abnormalities, hearing is grossly normal  Oropharynx/Mouth/Face: lips, tongue and gums are normal with no lesions, the voice quality was normal  Neck: neck is supple and symmetric, with midline trachea and no masses, thyroid is enlarged    Pulmonary: no increased work of breathing or signs of respiratory distress, lungs are clear to auscultation  Cardiovascular: normal heart rate and rhythm, normal S1 and S2,   Musculoskeletal: normal gait and station,   Neurological: normal coordination, normal general cortical function        Assessment/Plan    1. MULTINODULAR GOITER   She has  subclinical hyperthyroidism   TSH 0.76 in 06/20>>0.12 in July 2021m  She denies significant symptoms of we will continue to monitor  Reviewed US images in office today with the patient for the thyroid ultrasound done in August 2021  In right lobe , dominant nodule measures 1.2 cm solid . In left lobe nodule previously not biopsied now shows some suspicious features like calcification , dominant nodule measures 2.8 cm in mid pole is the same  I discussed FNA versus 6-month follow-up, patient chose to go with 6 months follow-up  It was explained to the patient if she wants to be 100% sure then she can have thyroidectomy done as she is having some swallowing issues. ---- FNA done at [facility].  8/2020 no malignant       2. Hyperlipidemia    On crestor 5 mg daily. Managed by PCP. 3. HTN   High BP.  She is nervous as it was her first visit. Follow-up with PCP. 4. Pre-diabetes . Discussed diet changes. Aic 5.9   Follows with her primary care physician    I spent  30 + minutes which includes reviewing patient chart , interpreting previous lab results  , discussing and providing counseling and coordinating care of patient's multiple health issues with  the patient.

## 2021-09-18 PROBLEM — Z13.79 GENETIC SCREENING: Status: RESOLVED | Noted: 2021-08-19 | Resolved: 2021-09-18

## 2021-09-24 NOTE — PROGRESS NOTES
[address]  Advanced CHF/Pulmonary Hypertension   Cardiac Evaluation      Nieves Mccord  YOB: 1960    Date of Visit:  9/30/21      Chief Complaint   Patient presents with    New Patient    Shortness of Breath        History of Present Illness:  Nieves Mccord is a 57 y.o. female who presents from referral from Dr. Salvatore Mckenzie for consultation and management of SOB and enlarged PA on chest CT. Nieves Mccord is 57 y.o. with PMH of HTN, breast lump, hyperlipidemia (not on therapy due to leg cramps with Crestor), and goiter. Today she is here as a new patient and she had an ECHO. Her Echo today was good and did not show enlarged RV. EF 55-60%. Discussed echo with patient. She did a calcium score which was 70. She was so sore in her joints on Crestor and could not stay on it. She admits to being a \"bad patient and doesn't like medications. \"  Discussed pravastatin as an option. Discussed why a statin is the best treatment for existing plaque in the arteries. Reviewed labs. She sees Dr. Salvatore Osorio for thyroid nodules. She had palpitations at the time of her stress test.  She has some SOB. No swelling in the legs or ankles or feet. She has chest pain when stressed and describes it as stabbing pain. Her brother had CABG as well as her Dad. Family History of lung cancer. EKG today read by me: Shows NSR rate 73.          Allergies   Allergen Reactions    Levaquin [Levofloxacin In D5w]     Morphine     Penicillins      Current Outpatient Medications   Medication Sig Dispense Refill    pravastatin (PRAVACHOL) 10 MG tablet Take 1 tablet by mouth daily 90 tablet 3    APPLE CIDER VINEGAR PO Take by mouth      potassium chloride (KLOR-CON M) 10 MEQ extended release tablet Take 1 tablet by mouth daily 90 tablet 3    lisinopril-hydroCHLOROthiazide (PRINZIDE;ZESTORETIC) 10-12.5 MG per tablet Take 1 tablet by mouth 2 times daily 180 tablet 3    aspirin 81 MG EC tablet Take 81 mg by mouth daily       No current facility-administered medications for this visit. Past Medical History:   Diagnosis Date    Breast lump or mass     Gallstones     Hypertension      Past Surgical History:   Procedure Laterality Date    BREAST SURGERY  [id number]    CHOLECYSTECTOMY, LAPAROSCOPIC  2017    COLONOSCOPY  8..    CYSTOSCOPY  7.    HYSTERECTOMY       Family History   Problem Relation Age of Onset    Cancer Mother 52        UTERINE AND LUNG, SMOKER    Cancer Father 64        THROAT CANCER    Cancer Brother 48        THROAT     Social History     Socioeconomic History    Marital status:      Spouse name: Not on file    Number of children: Not on file    Years of education: Not on file    Highest education level: Not on file   Occupational History    Not on file   Tobacco Use    Smoking status: Former Smoker     Packs/day: 2.00     Years: 27.00     Pack years: 54.00     Types: Cigarettes     Start date: 1981     Quit date: 2007     Years since quittin.7    Smokeless tobacco: Never Used    Tobacco comment: spoke with the patient and updated smoking history   Vaping Use    Vaping Use: Never used   Substance and Sexual Activity    Alcohol use: No     Alcohol/week: 0.0 standard drinks    Drug use: Not on file    Sexual activity: Yes     Partners: Male   Other Topics Concern    Not on file   Social History Narrative    Not on file     Social Determinants of Health     Financial Resource Strain: Low Risk     Difficulty of Paying Living Expenses: Not hard at all   Food Insecurity: No Food Insecurity    Worried About Running Out of Food in the Last Year: Never true    Radha of Food in the Last Year: Never true   Transportation Needs: No Transportation Needs    Lack of Transportation (Medical): No    Lack of Transportation (Non-Medical):  No   Physical Activity:     Days of Exercise per Week:     Minutes of Exercise per Session:    Stress:     Feeling of Stress :    Social Connections:     Frequency of Communication with Friends and Family:     Frequency of Social Gatherings with Friends and Family:     Attends Taoist Services:     Active Member of Clubs or Organizations:     Attends Club or Organization Meetings:     Marital Status:    Intimate Partner Violence:     Fear of Current or Ex-Partner:     Emotionally Abused:     Physically Abused:     Sexually Abused:        Review of Systems:   · Constitutional: there has been no unanticipated weight loss. There's been no change in energy level, sleep pattern, or activity level. · Eyes: No visual changes or diplopia. No scleral icterus. · ENT: No Headaches, hearing loss or vertigo. No mouth sores or sore throat. · Cardiovascular: Reviewed in HPI  · Respiratory: No cough or wheezing, no sputum production. No hematemesis. · Gastrointestinal: No abdominal pain, appetite loss, blood in stools. No change in bowel or bladder habits. · Genitourinary: No dysuria, trouble voiding, or hematuria. · Musculoskeletal:  No gait disturbance, weakness or joint complaints. · Integumentary: No rash or pruritis. · Neurological: No headache, diplopia, change in muscle strength, numbness or tingling. No change in gait, balance, coordination, mood, affect, memory, mentation, behavior. · Psychiatric: No anxiety, no depression. · Endocrine: No malaise, fatigue or temperature intolerance. No excessive thirst, fluid intake, or urination. No tremor. · Hematologic/Lymphatic: No abnormal bruising or bleeding, blood clots or swollen lymph nodes. · Allergic/Immunologic: No nasal congestion or hives. Physical Examination:    Vitals:    09/30/21 1007   BP: (!) 136/58   Pulse: 77   SpO2: 97%   Weight: 197 lb (89.4 kg)   Height: 5' 4.25\" (1.632 m)     Body mass index is 33.55 kg/m².      Wt Readings from Last 3 Encounters:   09/30/21 197 lb (89.4 kg)   08/26/21 198 lb (89.8 kg) 21 205 lb (93 kg)     BP Readings from Last 3 Encounters:   21 (!) 136/58   21 134/70   21 138/88     Constitutional and General Appearance:   WD/WN in NAD  HEENT:  NC/AT  HOMER  No problems with hearing  Skin:  Warm, dry  Respiratory:  · Normal excursion and expansion without use of accessory muscles  · Resp Auscultation: Normal breath sounds without dullness  Cardiovascular:  · The apical impulses not displaced  · Heart tones are crisp and normal  · Cervical veins are not engorged  · The carotid upstroke is normal in amplitude and contour without delay or bruit  · JVP less than 8 cm H2O  RRR with nl S1 and S2 without m,r,g  · Peripheral pulses are symmetrical and full  · There is no clubbing, cyanosis of the extremities. · No edema  · Femoral Arteries: 2+ and equal  · Pedal Pulses: 2+ and equal   Neck:  · No thyromegaly  Abdomen:  · No masses or tenderness  · Liver/Spleen: No Abnormalities Noted  Neurological/Psychiatric:  · Alert and oriented in all spheres  · Moves all extremities well  · Exhibits normal gait balance and coordination  · No abnormalities of mood, affect, memory, mentation, or behavior are noted      Echo 21 normal LV function, no evidence of pulmonary hypertension, normal right atrium and right ventricular size and function. CT Calcium Score: 20  FINDINGS:   LEFT MAIN: Zero (0).     RIGHT CORONARY ARTERY: 47       LEFT ANTERIOR DESCENDIN       CIRCUMFLEX: 4       TOTAL AGATSTON CALCIUM SCORE: 70       EXTRACARDIAC STRUCTURES: No evidence of mediastinal or hilar lymphadenopathy. No pericardial effusion.  No cardiomegaly.  There is bibasilar scarring.  No   noncalcified nodules are noted in the lungs.  Limited images of the upper   abdomen are grossly unremarkable.  Visualized osseous structures demonstrate   age related degenerative changes without acute abnormality. CT chest 21  1.  Stable CT scan of the chest, without evidence of acute cardiopulmonary   disease   2. Enlarged main pulmonary artery, measuring 40 mm, suggesting pulmonary   arterial hypertension   3. Diffusely enlarged thyroid gland.  Correlation with the scheduled thyroid   ultrasound recommended. 4. Coronary arterial calcifications     Stress Test 12/30/15  Summary    Normal myocardial perfusion study.       Stress Protocols        Resting ECG    Normal sinus rhythm. Labs were reviewed including labs from other hospital systems through [facility]. Cardiac testing was reviewed including echos, nuclear scans, cardiac catheterization, including from other hospital systems through [facility]. Assessment:    1. Enlarged pulmonary artery ([address]    2. SOB (shortness of breath)    3. Hypercholesterolemia    4. Vitamin D deficiency    5. Essential hypertension         Plan:  1. Start Vitamin D 4000 units a day. 2.  Start Pravastatin 10mg once a day at night two weeks after taking vitamin D  3. Schedule Stress Test at [facility]. No caffeine for 24 hours prior to testing. She has positive calcium score and strong family history. 4.  Labs in 2-3 months FASTING. 5.  See Dr. Jamie Rehman in 6 months. Scribe's attestation: This note was scribed in the presence of Matt Ariza M.D. by Domenic Burgess RN     The scribe's documentation has been prepared under my direction and personally reviewed by me in its entirety. I confirm that the note above accurately reflects all work, treatment, procedures, and medical decision making performed by me. QUALITY MEASURES  1. Tobacco Cessation Counseling: NA quit in 2007  2. Retake of BP if >140/90:   NA  3. Documentation to PCP/referring for new patient:  Sent to PCP at close of office visit  4. CAD patient on anti-platelet: NA  5. CAD patient on STATIN therapy:  NA  6. Patient with CHF and aFib on anticoagulation:  NA       Time Based Itemization  A total of 60 minutes was spent on today's patient encounter.   If applicable, non-patient-facing activities:  ( x)Preparing to see the patient and reviewing records  ( x) Individual interpretation of results  ( ) Discussion or coordination of care with other health care professionals  ( x) Ordering of unique tests, medications, or procedures  ( x) Documentation within the EHR      I appreciate the opportunity of cooperating in the care of this patient.     Catherine Banks M.D., [facility]

## 2021-09-30 ENCOUNTER — PROCEDURE VISIT (OUTPATIENT)
Dept: CARDIOLOGY CLINIC | Age: 61
End: 2021-09-30
Payer: COMMERCIAL

## 2021-09-30 ENCOUNTER — OFFICE VISIT (OUTPATIENT)
Dept: CARDIOLOGY CLINIC | Age: 61
End: 2021-09-30
Payer: COMMERCIAL

## 2021-09-30 VITALS
OXYGEN SATURATION: 97 % | HEIGHT: 64 IN | SYSTOLIC BLOOD PRESSURE: 136 MMHG | WEIGHT: 197 LBS | HEART RATE: 77 BPM | DIASTOLIC BLOOD PRESSURE: 58 MMHG | BODY MASS INDEX: 33.63 KG/M2

## 2021-09-30 DIAGNOSIS — I28.8 ENLARGED PULMONARY ARTERY (HCC): Primary | ICD-10-CM

## 2021-09-30 DIAGNOSIS — I27.21 PAH (PULMONARY ARTERY HYPERTENSION) (HCC): ICD-10-CM

## 2021-09-30 DIAGNOSIS — R06.02 SOB (SHORTNESS OF BREATH): ICD-10-CM

## 2021-09-30 DIAGNOSIS — E55.9 VITAMIN D DEFICIENCY: ICD-10-CM

## 2021-09-30 DIAGNOSIS — I10 ESSENTIAL HYPERTENSION: ICD-10-CM

## 2021-09-30 DIAGNOSIS — E78.00 HYPERCHOLESTEROLEMIA: ICD-10-CM

## 2021-09-30 LAB
LV EF: 58 %
LVEF MODALITY: NORMAL

## 2021-09-30 PROCEDURE — 93306 TTE W/DOPPLER COMPLETE: CPT | Performed by: INTERNAL MEDICINE

## 2021-09-30 PROCEDURE — 93000 ELECTROCARDIOGRAM COMPLETE: CPT | Performed by: INTERNAL MEDICINE

## 2021-09-30 PROCEDURE — 99244 OFF/OP CNSLTJ NEW/EST MOD 40: CPT | Performed by: INTERNAL MEDICINE

## 2021-09-30 RX ORDER — PRAVASTATIN SODIUM 10 MG
10 TABLET ORAL DAILY
Qty: 90 TABLET | Refills: 3 | Status: ON HOLD | OUTPATIENT
Start: 2021-09-30 | End: 2021-11-16 | Stop reason: HOSPADM

## 2021-09-30 NOTE — PATIENT INSTRUCTIONS
Plan:  1. Start Vitamin D 4000 units a day. 2.  Start Pravastatin 10mg once a day. 3.  Schedule Stress Test at [facility] [place]. No caffeine for 24 hours prior to testing. 4.  Labs in 2-3 months FASTING. 5.  See Dr. Zuleyka Jackson in 6 months.

## 2021-11-01 ENCOUNTER — HOSPITAL ENCOUNTER (OUTPATIENT)
Dept: NON INVASIVE DIAGNOSTICS | Age: 61
Discharge: HOME | End: 2021-11-01
Payer: COMMERCIAL

## 2021-11-01 DIAGNOSIS — R06.02 SOB (SHORTNESS OF BREATH): ICD-10-CM

## 2021-11-01 DIAGNOSIS — R94.39 ABNORMAL STRESS TEST: ICD-10-CM

## 2021-11-01 DIAGNOSIS — R06.02 SOB (SHORTNESS OF BREATH): Primary | ICD-10-CM

## 2021-11-01 LAB
LV EF: 68 %
LVEF MODALITY: NORMAL

## 2021-11-01 PROCEDURE — 93017 CV STRESS TEST TRACING ONLY: CPT | Performed by: INTERNAL MEDICINE

## 2021-11-01 PROCEDURE — 78452 HT MUSCLE IMAGE SPECT MULT: CPT | Performed by: INTERNAL MEDICINE

## 2021-11-01 PROCEDURE — A9502 TC99M TETROFOSMIN: HCPCS | Performed by: INTERNAL MEDICINE

## 2021-11-01 PROCEDURE — 3430000000 HC RX DIAGNOSTIC RADIOPHARMACEUTICAL: Performed by: INTERNAL MEDICINE

## 2021-11-01 RX ADMIN — TETROFOSMIN 30 MILLICURIE: 1.38 INJECTION, POWDER, LYOPHILIZED, FOR SOLUTION INTRAVENOUS at 09:20

## 2021-11-01 RX ADMIN — TETROFOSMIN 10 MILLICURIE: 1.38 INJECTION, POWDER, LYOPHILIZED, FOR SOLUTION INTRAVENOUS at 08:14

## 2021-11-01 NOTE — PROGRESS NOTES
Patient instructed on Raúl Protocol Stress Test Procedure including possible side effects and adverse reactions. Verbalizes knowledge and understanding and denies having any questions.

## 2021-11-16 ENCOUNTER — HOSPITAL ENCOUNTER (OUTPATIENT)
Dept: CARDIAC CATH/INVASIVE PROCEDURES | Age: 61
Discharge: HOME | End: 2021-11-16
Attending: INTERNAL MEDICINE | Admitting: INTERNAL MEDICINE
Payer: COMMERCIAL

## 2021-11-16 VITALS
DIASTOLIC BLOOD PRESSURE: 80 MMHG | HEIGHT: 64 IN | SYSTOLIC BLOOD PRESSURE: 166 MMHG | TEMPERATURE: 98.4 F | WEIGHT: 197 LBS | HEART RATE: 69 BPM | RESPIRATION RATE: 18 BRPM | BODY MASS INDEX: 33.63 KG/M2 | OXYGEN SATURATION: 96 %

## 2021-11-16 DIAGNOSIS — R06.02 SOB (SHORTNESS OF BREATH): ICD-10-CM

## 2021-11-16 DIAGNOSIS — R94.39 ABNORMAL STRESS TEST: ICD-10-CM

## 2021-11-16 LAB
ANION GAP SERPL CALCULATED.3IONS-SCNC: 12 MMOL/L (ref 3–16)
BUN BLDV-MCNC: 16 MG/DL (ref 7–20)
CALCIUM SERPL-MCNC: 9.4 MG/DL (ref 8.3–10.6)
CHLORIDE BLD-SCNC: 101 MMOL/L (ref 99–110)
CO2: 26 MMOL/L (ref 21–32)
CREAT SERPL-MCNC: <0.5 MG/DL (ref 0.6–1.2)
EKG ATRIAL RATE: 69 BPM
EKG DIAGNOSIS: NORMAL
EKG P AXIS: 71 DEGREES
EKG P-R INTERVAL: 184 MS
EKG Q-T INTERVAL: 390 MS
EKG QRS DURATION: 96 MS
EKG QTC CALCULATION (BAZETT): 417 MS
EKG R AXIS: 7 DEGREES
EKG T AXIS: 44 DEGREES
EKG VENTRICULAR RATE: 69 BPM
GFR AFRICAN AMERICAN: >60
GFR NON-AFRICAN AMERICAN: >60
GLUCOSE BLD-MCNC: 114 MG/DL (ref 70–99)
HCT VFR BLD CALC: 42.5 % (ref 36–48)
HEMOGLOBIN: 14.3 G/DL (ref 12–16)
LEFT VENTRICULAR EJECTION FRACTION MODE: NORMAL
LV EF: 60 %
MCH RBC QN AUTO: 28.5 PG (ref 26–34)
MCHC RBC AUTO-ENTMCNC: 33.8 G/DL (ref 31–36)
MCV RBC AUTO: 84.4 FL (ref 80–100)
PDW BLD-RTO: 13.1 % (ref 12.4–15.4)
PLATELET # BLD: 291 K/UL (ref 135–450)
PMV BLD AUTO: 8.3 FL (ref 5–10.5)
POC ACT LR: 233 SEC
POC ACT LR: 325 SEC
POC ACT LR: >400 SEC
POTASSIUM SERPL-SCNC: 3.6 MMOL/L (ref 3.5–5.1)
RBC # BLD: 5.03 M/UL (ref 4–5.2)
SODIUM BLD-SCNC: 139 MMOL/L (ref 136–145)
WBC # BLD: 6.2 K/UL (ref 4–11)

## 2021-11-16 PROCEDURE — 92928 PRQ TCAT PLMT NTRAC ST 1 LES: CPT | Performed by: INTERNAL MEDICINE

## 2021-11-16 PROCEDURE — 85347 COAGULATION TIME ACTIVATED: CPT

## 2021-11-16 PROCEDURE — 36415 COLL VENOUS BLD VENIPUNCTURE: CPT

## 2021-11-16 PROCEDURE — 93458 L HRT ARTERY/VENTRICLE ANGIO: CPT

## 2021-11-16 PROCEDURE — 6360000002 HC RX W HCPCS

## 2021-11-16 PROCEDURE — 6370000000 HC RX 637 (ALT 250 FOR IP)

## 2021-11-16 PROCEDURE — C1874 STENT, COATED/COV W/DEL SYS: HCPCS

## 2021-11-16 PROCEDURE — 99153 MOD SED SAME PHYS/QHP EA: CPT

## 2021-11-16 PROCEDURE — 2580000003 HC RX 258

## 2021-11-16 PROCEDURE — 93571 IV DOP VEL&/PRESS C FLO 1ST: CPT

## 2021-11-16 PROCEDURE — 80048 BASIC METABOLIC PNL TOTAL CA: CPT

## 2021-11-16 PROCEDURE — 93572 IV DOP VEL&/PRESS C FLO EA: CPT | Performed by: INTERNAL MEDICINE

## 2021-11-16 PROCEDURE — C1725 CATH, TRANSLUMIN NON-LASER: HCPCS

## 2021-11-16 PROCEDURE — 6360000004 HC RX CONTRAST MEDICATION: Performed by: INTERNAL MEDICINE

## 2021-11-16 PROCEDURE — 93458 L HRT ARTERY/VENTRICLE ANGIO: CPT | Performed by: INTERNAL MEDICINE

## 2021-11-16 PROCEDURE — 99152 MOD SED SAME PHYS/QHP 5/>YRS: CPT

## 2021-11-16 PROCEDURE — 92928 PRQ TCAT PLMT NTRAC ST 1 LES: CPT

## 2021-11-16 PROCEDURE — 93571 IV DOP VEL&/PRESS C FLO 1ST: CPT | Performed by: INTERNAL MEDICINE

## 2021-11-16 PROCEDURE — 93005 ELECTROCARDIOGRAM TRACING: CPT | Performed by: INTERNAL MEDICINE

## 2021-11-16 PROCEDURE — 93010 ELECTROCARDIOGRAM REPORT: CPT | Performed by: INTERNAL MEDICINE

## 2021-11-16 PROCEDURE — 2500000003 HC RX 250 WO HCPCS

## 2021-11-16 PROCEDURE — 93572 IV DOP VEL&/PRESS C FLO EA: CPT

## 2021-11-16 PROCEDURE — C1894 INTRO/SHEATH, NON-LASER: HCPCS

## 2021-11-16 PROCEDURE — 99152 MOD SED SAME PHYS/QHP 5/>YRS: CPT | Performed by: INTERNAL MEDICINE

## 2021-11-16 PROCEDURE — C1887 CATHETER, GUIDING: HCPCS

## 2021-11-16 PROCEDURE — 85027 COMPLETE CBC AUTOMATED: CPT

## 2021-11-16 PROCEDURE — 2709999900 HC NON-CHARGEABLE SUPPLY

## 2021-11-16 PROCEDURE — C1769 GUIDE WIRE: HCPCS

## 2021-11-16 RX ORDER — MULTIVIT-MIN/IRON/FOLIC ACID/K 18-600-40
2 CAPSULE ORAL DAILY
COMMUNITY

## 2021-11-16 RX ORDER — ONDANSETRON 2 MG/ML
4 INJECTION INTRAMUSCULAR; INTRAVENOUS EVERY 6 HOURS PRN
Status: DISCONTINUED | OUTPATIENT
Start: 2021-11-16 | End: 2021-11-16 | Stop reason: HOSPADM

## 2021-11-16 RX ORDER — CLOPIDOGREL BISULFATE 75 MG/1
75 TABLET ORAL DAILY
Qty: 90 TABLET | Refills: 3 | Status: SHIPPED | OUTPATIENT
Start: 2021-11-16 | End: 2022-09-28 | Stop reason: ALTCHOICE

## 2021-11-16 RX ORDER — SODIUM CHLORIDE 9 MG/ML
INJECTION, SOLUTION INTRAVENOUS CONTINUOUS
Status: DISCONTINUED | OUTPATIENT
Start: 2021-11-16 | End: 2021-11-16 | Stop reason: HOSPADM

## 2021-11-16 RX ORDER — SODIUM CHLORIDE 0.9 % (FLUSH) 0.9 %
5-40 SYRINGE (ML) INJECTION PRN
Status: DISCONTINUED | OUTPATIENT
Start: 2021-11-16 | End: 2021-11-16 | Stop reason: HOSPADM

## 2021-11-16 RX ORDER — LISINOPRIL 5 MG/1
5 TABLET ORAL DAILY
Qty: 90 TABLET | Refills: 1 | Status: SHIPPED | OUTPATIENT
Start: 2021-11-16 | End: 2021-11-19

## 2021-11-16 RX ORDER — ROSUVASTATIN CALCIUM 20 MG/1
20 TABLET, COATED ORAL DAILY
Qty: 90 TABLET | Refills: 1 | Status: SHIPPED | OUTPATIENT
Start: 2021-11-16 | End: 2022-03-18

## 2021-11-16 RX ORDER — AMPICILLIN TRIHYDRATE 250 MG
2 CAPSULE ORAL 2 TIMES DAILY
COMMUNITY
End: 2021-11-19

## 2021-11-16 RX ORDER — ACETAMINOPHEN 325 MG/1
650 TABLET ORAL EVERY 4 HOURS PRN
Status: DISCONTINUED | OUTPATIENT
Start: 2021-11-16 | End: 2021-11-16 | Stop reason: HOSPADM

## 2021-11-16 RX ORDER — SODIUM CHLORIDE 0.9 % (FLUSH) 0.9 %
5-40 SYRINGE (ML) INJECTION EVERY 12 HOURS SCHEDULED
Status: DISCONTINUED | OUTPATIENT
Start: 2021-11-16 | End: 2021-11-16 | Stop reason: HOSPADM

## 2021-11-16 RX ORDER — METOPROLOL SUCCINATE 25 MG/1
25 TABLET, EXTENDED RELEASE ORAL DAILY
Qty: 90 TABLET | Refills: 1 | Status: SHIPPED | OUTPATIENT
Start: 2021-11-16 | End: 2021-12-10

## 2021-11-16 RX ORDER — SODIUM CHLORIDE 9 MG/ML
25 INJECTION, SOLUTION INTRAVENOUS PRN
Status: DISCONTINUED | OUTPATIENT
Start: 2021-11-16 | End: 2021-11-16 | Stop reason: HOSPADM

## 2021-11-16 RX ADMIN — IOPAMIDOL 69 ML: 755 INJECTION, SOLUTION INTRAVENOUS at 10:45

## 2021-11-16 NOTE — OP NOTE
Patient:  Brennan Lima   :   1960    Procedural Summary  ~Consent:   Obtained written and verbal consent      Risks/benefits explained in detail  ~Procedure:    Left Heart Catheterization  ~Medications:    Procedural sedation with minimal conscious sedation  ~Complications:   None  ~Blood Loss:    <10cc  ~Specimens:    None obtained  ~Pre-sedation re-evaluation: Performed immediately prior to procedure. Medication and Procedural Reconciliation:  An independent trained observer pushed medications at my direction. We monitored the patient's level of consciousness and vital signs/physiologic status throughout the procedure duration (see start and stop times below). Sedation: 4 mg Versed, 300 mcg Fentanyl  Sedation start: 943  Sedation stop:     Cardiac Cath PCI FFR:  Anatomy:   LM-nml   LAD-nml  Cx-nml  OM- prox 50%  RCA-mid 80% prox 20%  RPDA- nml  LVEF- 60  LVG- nml  LVEDP- 9    LCX FFR 0.96  RCA FFR 0.84  RCA DFR 0.86    Intervention  ~Successful PCI to RCA with 4.0x18 AARON. PD with 4.0x12 NC to 24atm Excellent Result. Contrast: 69  Flouro Time: 7.8  Access: R radial a    Impression  ~Coronary Angiography w/ severe single vessel CAD  ~LVG with LVEF of 60 and no regional wall motion abnormalities  ~Successful complex angioplasty and stenting of RCA        Recommendation  ~Aggressive medical treatment and risk factor modification  ~1. Post cath IVF. Bedrest.   2. Recommend beta blocker, high potency statin, aspirin and plavix for 6-12 months. No ace/arb required given normal LVEF. 3. Referral to outpatient cardiac rehab phase II will be deferred until patient follow-up in office and then determine patient safety and appropriateness to proceed  4. Patient has been advised on the importance of regular exercise of at least 20-30 minutes daily. 5. Patient counseled about and offered assistance for smoking cessation   6.  Follow up in 2 weeks with cardiology            Citlalli Milan MD, MD 11/16/2021 10:50 AM

## 2021-11-16 NOTE — H&P
[facility]  Advanced CHF/Pulmonary Hypertension   Cardiac Evaluation      Ruthann Wharton  YOB: 1960    Date of Visit:  9/30/21      No chief complaint on file. History of Present Illness:  Ruthann Wharton is a 57 y.o. female who presents from referral from Dr. Mike Spence for consultation and management of SOB and enlarged PA on chest CT. Ruthann Wharton is 57 y.o. with PMH of HTN, breast lump, hyperlipidemia (not on therapy due to leg cramps with Crestor), and goiter. Today she is here as a new patient and she had an ECHO. Her Echo today was good and did not show enlarged RV. EF 55-60%. Discussed echo with patient. She did a calcium score which was 70. She was so sore in her joints on Crestor and could not stay on it. She admits to being a \"bad patient and doesn't like medications. \"  Discussed pravastatin as an option. Discussed why a statin is the best treatment for existing plaque in the arteries. Reviewed labs. She sees Dr. Sherley Mendoza for thyroid nodules. She had palpitations at the time of her stress test.  She has some SOB. No swelling in the legs or ankles or feet. She has chest pain when stressed and describes it as stabbing pain. Her brother had CABG as well as her Dad. Family History of lung cancer. EKG today read by me: Shows NSR rate 73.          Allergies   Allergen Reactions    Levaquin [Levofloxacin In D5w]     Morphine     Penicillins      Current Facility-Administered Medications   Medication Dose Route Frequency Provider Last Rate Last Admin    sodium chloride flush 0.9 % injection 5-40 mL  5-40 mL IntraVENous PRN Merissa Stein MD           Past Medical History:   Diagnosis Date    Breast lump or mass     Gallstones     Hypertension      Past Surgical History:   Procedure Laterality Date    BREAST SURGERY  [id number]    CHOLECYSTECTOMY, LAPAROSCOPIC  02/03/2017    COLONOSCOPY  8.1.2010    CYSTOSCOPY  7.19.2007    HYSTERECTOMY  2007     Family History   Problem Relation Age of Onset    Cancer Mother 52        UTERINE AND LUNG, SMOKER    Cancer Father 64        THROAT CANCER    Cancer Brother 48        THROAT     Social History     Socioeconomic History    Marital status:      Spouse name: Not on file    Number of children: Not on file    Years of education: Not on file    Highest education level: Not on file   Occupational History    Not on file   Tobacco Use    Smoking status: Former Smoker     Packs/day: 2.00     Years: 27.00     Pack years: 54.00     Types: Cigarettes     Start date: 1981     Quit date: 2007     Years since quittin.8    Smokeless tobacco: Never Used    Tobacco comment: spoke with the patient and updated smoking history   Vaping Use    Vaping Use: Never used   Substance and Sexual Activity    Alcohol use: No     Alcohol/week: 0.0 standard drinks    Drug use: Not on file    Sexual activity: Yes     Partners: Male   Other Topics Concern    Not on file   Social History Narrative    Not on file     Social Determinants of Health     Financial Resource Strain: Low Risk     Difficulty of Paying Living Expenses: Not hard at all   Food Insecurity: No Food Insecurity    Worried About Running Out of Food in the Last Year: Never true    Radha of Food in the Last Year: Never true   Transportation Needs: No Transportation Needs    Lack of Transportation (Medical): No    Lack of Transportation (Non-Medical):  No   Physical Activity:     Days of Exercise per Week: Not on file    Minutes of Exercise per Session: Not on file   Stress:     Feeling of Stress : Not on file   Social Connections:     Frequency of Communication with Friends and Family: Not on file    Frequency of Social Gatherings with Friends and Family: Not on file    Attends [employer] Services: Not on file    Active Member of Clubs or Organizations: Not on file    Attends Club or Organization Meetings: Not on cardiopulmonary   disease   2. Enlarged main pulmonary artery, measuring 40 mm, suggesting pulmonary   arterial hypertension   3. Diffusely enlarged thyroid gland.  Correlation with the scheduled thyroid   ultrasound recommended. 4. Coronary arterial calcifications     Stress Test 12/30/15  Summary    Normal myocardial perfusion study.       Stress Protocols        Resting ECG    Normal sinus rhythm. Labs were reviewed including labs from other hospital systems through [facility]. Cardiac testing was reviewed including echos, nuclear scans, cardiac catheterization, including from other hospital systems through [facility]. Assessment:    1. SOB (shortness of breath)    2. Abnormal stress test         Plan:  1. Start Vitamin D 4000 units a day. 2.  Start Pravastatin 10mg once a day at night two weeks after taking vitamin D  3. Schedule Stress Test at [facility]. No caffeine for 24 hours prior to testing. She has positive calcium score and strong family history. 4.  Labs in 2-3 months FASTING. 5.  See Dr. Gordon Service in 6 months. QUALITY MEASURES  1. Tobacco Cessation Counseling: NA quit in 2007  2. Retake of BP if >140/90:   NA  3. Documentation to PCP/referring for new patient:  Sent to PCP at close of office visit  4. CAD patient on anti-platelet: NA  5. CAD patient on STATIN therapy:  NA  6. Patient with CHF and aFib on anticoagulation:  NA       Abdullahi Pearce M.D., [facility]    I have reviewed the history and physical and examined the patient and find no relevant changes. She had an abnormal stress test.  I have reviewed with the patient and/or family the risks, benefits, and alternatives to the procedure.     The overall quality of the study is good. There is subdiaphragmatic    attenuation.        The left ventricular cavity size is normal at 92 ml. The right ventricle is    normal in size.        SPECT images demonstrate a small area of mildly decreased perfusion at the    apex.  The defect is present at rest and stress, consistent with prior    infarct.        Sum stress score of 1. No visual TID. Calculated TID is 0.87        Calculated LVEF is normal at 68%.      Myocardial perfusion is abnormal. Moderate risk scan. Symptoms with stress.        Recommendation    Recommend coronary angiogram to further evaluate. Results relayed to Dr. Mickey Pimentel         Based on these findings I recommend left heart cath for definitive evaluation of coronary arteries. Risks, benefits, expectations, and alternative treatments were discussed. Questions appropriately answered. Bernardo Swanson agrees to proceed and verbalized understanding.        Agnes Cunha MD 11/16/2021 8:43 AM

## 2021-11-16 NOTE — PRE SEDATION
Brief Pre-Op Note/Sedation Assessment      Wendy Kuo  1960  [id number]  8:45 AM    Planned Procedure: Cardiac Catheterization Procedure  Post Procedure Plan: Return to same level of care  Consent: I have discussed with the patient and/or the patient representative the indication, alternatives, and the possible risks and/or complications of the planned procedure and the anesthesia methods. The patient and/or patient representative appear to understand and agree to proceed. Chief Complaint:   Dyspnea on Exertion  Dyspnea      Indications for Cath Procedure:  1. Presentation:  New Onset Angina <= 2 months, Worsening Angina and Suspected CAD  2. Anginal Classification within 2 weeks:  CCS III - Symptoms with everyday living activities, i.e., moderate limitation  3. Angina Symptoms Assessment:  Atypical Chest Pain  4. Heart Failure Class within last 2 weeks:  Yes:  Heart Failure Type: Diastolic Severity:  Class III - Symptoms of HF on less-than-ordinary exertion  5. Cardiovascular Instability:  No    Prior Ischemic Workup/Eval:  1. Pre-Procedural Medications: Yes: ACE/ARB/ARNI, Aspirin, Beta Blockers and STATIN  2. Stress Test Completed? Yes:  Stress or Imaging Studies Performed (within ANY time period):   Type:  Stress Nuclear  Results:  Positive:  Myocardial Perfusion Defects (Nuclear) Extent of Ischemia:  Intermediate    Does Patient need surgery? Cath Valve Surgery:  No    Pre-Procedure Medical History:  Vital Signs:  BP (!) 166/80   Pulse 69   Temp 98.4 °F (36.9 °C) (Temporal)   Resp 18   Ht 5' 4\" (1.626 m)   Wt 197 lb (89.4 kg)   SpO2 96%   BMI 33.81 kg/m²     Allergies:     Allergies   Allergen Reactions    Levaquin [Levofloxacin In D5w]     Morphine     Penicillins      Medications:    Current Facility-Administered Medications   Medication Dose Route Frequency Provider Last Rate Last Admin    sodium chloride flush 0.9 % injection 5-40 mL  5-40 mL IntraVENous PRN Christine Rodas Thi Senior MD           Past Medical History:    Past Medical History:   Diagnosis Date    Breast lump or mass     Gallstones     Hypertension        Surgical History:    Past Surgical History:   Procedure Laterality Date    BREAST SURGERY  [id number]    CHOLECYSTECTOMY, LAPAROSCOPIC  02/03/2017    COLONOSCOPY  8.1.2010    CYSTOSCOPY  7.19.2007    HYSTERECTOMY  2007             Pre-Sedation:  Pre-Sedation Documentation and Exam:  I have personally completed a history, physical exam & review of systems for this patient (see notes). Prior History of Anesthesia Complications:   none    Modified Mallampati:  II (soft palate, uvula, fauces visible)    ASA Classification:  Class 2 - A normal healthy patient with mild systemic disease    Rosetta Scale: Activity:  2 - Able to move 4 extremities voluntarily on command  Respiration:  2 - Able to breathe deeply and cough freely  Circulation:  2 - BP+/- 20mmHg of normal  Consciousness:  2 - Fully awake  Oxygen Saturation (color):  2 - Able to maintain oxygen saturation >92% on room air    Sedation/Anesthesia Plan:  Guard the patient's safety and welfare. Minimize physical discomfort and pain. Minimize negative psychological responses to treatment by providing sedation and analgesia and maximize the potential amnesia. Patient to meet pre-procedure discharge plan.     Medication Planned:  midazolam intravenously and fentanyl intravenously    Patient is an appropriate candidate for plan of sedation:   yes      Electronically signed by Izaiah Alaniz MD on 11/16/2021 at 8:45 AM

## 2021-11-17 ENCOUNTER — HOSPITAL ENCOUNTER (OUTPATIENT)
Age: 61
Setting detail: OBSERVATION
Discharge: HOME | End: 2021-11-17
Attending: EMERGENCY MEDICINE | Admitting: INTERNAL MEDICINE
Payer: COMMERCIAL

## 2021-11-17 ENCOUNTER — APPOINTMENT (OUTPATIENT)
Dept: GENERAL RADIOLOGY | Age: 61
End: 2021-11-17
Payer: COMMERCIAL

## 2021-11-17 ENCOUNTER — APPOINTMENT (OUTPATIENT)
Dept: CT IMAGING | Age: 61
End: 2021-11-17
Payer: COMMERCIAL

## 2021-11-17 ENCOUNTER — TELEPHONE (OUTPATIENT)
Dept: FAMILY MEDICINE CLINIC | Age: 61
End: 2021-11-17

## 2021-11-17 VITALS
OXYGEN SATURATION: 94 % | SYSTOLIC BLOOD PRESSURE: 123 MMHG | TEMPERATURE: 98 F | RESPIRATION RATE: 18 BRPM | HEART RATE: 69 BPM | WEIGHT: 197 LBS | DIASTOLIC BLOOD PRESSURE: 55 MMHG | HEIGHT: 64 IN | BODY MASS INDEX: 33.63 KG/M2

## 2021-11-17 DIAGNOSIS — R07.9 CHEST PAIN, UNSPECIFIED TYPE: Primary | ICD-10-CM

## 2021-11-17 PROBLEM — I25.10 CORONARY ARTERY DISEASE INVOLVING NATIVE CORONARY ARTERY OF NATIVE HEART WITHOUT ANGINA PECTORIS: Status: ACTIVE | Noted: 2021-11-17

## 2021-11-17 LAB
A/G RATIO: 1.3 (ref 1.1–2.2)
ALBUMIN SERPL-MCNC: 3.9 G/DL (ref 3.4–5)
ALP BLD-CCNC: 63 U/L (ref 40–129)
ALT SERPL-CCNC: 16 U/L (ref 10–40)
ANION GAP SERPL CALCULATED.3IONS-SCNC: 8 MMOL/L (ref 3–16)
AST SERPL-CCNC: 16 U/L (ref 15–37)
BASOPHILS ABSOLUTE: 0.1 K/UL (ref 0–0.2)
BASOPHILS RELATIVE PERCENT: 0.9 %
BILIRUB SERPL-MCNC: 0.3 MG/DL (ref 0–1)
BUN BLDV-MCNC: 16 MG/DL (ref 7–20)
CALCIUM SERPL-MCNC: 8.9 MG/DL (ref 8.3–10.6)
CHLORIDE BLD-SCNC: 102 MMOL/L (ref 99–110)
CO2: 27 MMOL/L (ref 21–32)
CREAT SERPL-MCNC: <0.5 MG/DL (ref 0.6–1.2)
EKG ATRIAL RATE: 76 BPM
EKG DIAGNOSIS: NORMAL
EKG P AXIS: 76 DEGREES
EKG P-R INTERVAL: 196 MS
EKG Q-T INTERVAL: 374 MS
EKG QRS DURATION: 94 MS
EKG QTC CALCULATION (BAZETT): 420 MS
EKG R AXIS: -3 DEGREES
EKG T AXIS: 46 DEGREES
EKG VENTRICULAR RATE: 76 BPM
EOSINOPHILS ABSOLUTE: 0.1 K/UL (ref 0–0.6)
EOSINOPHILS RELATIVE PERCENT: 0.9 %
GFR AFRICAN AMERICAN: >60
GFR NON-AFRICAN AMERICAN: >60
GLUCOSE BLD-MCNC: 125 MG/DL (ref 70–99)
HCT VFR BLD CALC: 38.4 % (ref 36–48)
HEMOGLOBIN: 12.9 G/DL (ref 12–16)
LYMPHOCYTES ABSOLUTE: 1.2 K/UL (ref 1–5.1)
LYMPHOCYTES RELATIVE PERCENT: 15.7 %
MCH RBC QN AUTO: 28.4 PG (ref 26–34)
MCHC RBC AUTO-ENTMCNC: 33.7 G/DL (ref 31–36)
MCV RBC AUTO: 84.4 FL (ref 80–100)
MONOCYTES ABSOLUTE: 0.5 K/UL (ref 0–1.3)
MONOCYTES RELATIVE PERCENT: 6.4 %
NEUTROPHILS ABSOLUTE: 6 K/UL (ref 1.7–7.7)
NEUTROPHILS RELATIVE PERCENT: 76.1 %
PDW BLD-RTO: 13.1 % (ref 12.4–15.4)
PLATELET # BLD: 257 K/UL (ref 135–450)
PMV BLD AUTO: 8.4 FL (ref 5–10.5)
POTASSIUM SERPL-SCNC: 3.4 MMOL/L (ref 3.5–5.1)
PRO-BNP: 56 PG/ML (ref 0–124)
RBC # BLD: 4.55 M/UL (ref 4–5.2)
SODIUM BLD-SCNC: 137 MMOL/L (ref 136–145)
TOTAL PROTEIN: 6.9 G/DL (ref 6.4–8.2)
TROPONIN: <0.01 NG/ML
WBC # BLD: 7.9 K/UL (ref 4–11)

## 2021-11-17 PROCEDURE — 85025 COMPLETE CBC W/AUTO DIFF WBC: CPT

## 2021-11-17 PROCEDURE — 71045 X-RAY EXAM CHEST 1 VIEW: CPT

## 2021-11-17 PROCEDURE — 6370000000 HC RX 637 (ALT 250 FOR IP): Performed by: INTERNAL MEDICINE

## 2021-11-17 PROCEDURE — G0378 HOSPITAL OBSERVATION PER HR: HCPCS

## 2021-11-17 PROCEDURE — 99204 OFFICE O/P NEW MOD 45 MIN: CPT | Performed by: INTERNAL MEDICINE

## 2021-11-17 PROCEDURE — 36415 COLL VENOUS BLD VENIPUNCTURE: CPT

## 2021-11-17 PROCEDURE — 99285 EMERGENCY DEPT VISIT HI MDM: CPT

## 2021-11-17 PROCEDURE — 80053 COMPREHEN METABOLIC PANEL: CPT

## 2021-11-17 PROCEDURE — 6360000004 HC RX CONTRAST MEDICATION: Performed by: EMERGENCY MEDICINE

## 2021-11-17 PROCEDURE — 83880 ASSAY OF NATRIURETIC PEPTIDE: CPT

## 2021-11-17 PROCEDURE — 74174 CTA ABD&PLVS W/CONTRAST: CPT

## 2021-11-17 PROCEDURE — 2580000003 HC RX 258: Performed by: INTERNAL MEDICINE

## 2021-11-17 PROCEDURE — 84484 ASSAY OF TROPONIN QUANT: CPT

## 2021-11-17 PROCEDURE — 6370000000 HC RX 637 (ALT 250 FOR IP): Performed by: EMERGENCY MEDICINE

## 2021-11-17 PROCEDURE — 93005 ELECTROCARDIOGRAM TRACING: CPT | Performed by: EMERGENCY MEDICINE

## 2021-11-17 PROCEDURE — 93010 ELECTROCARDIOGRAM REPORT: CPT | Performed by: INTERNAL MEDICINE

## 2021-11-17 RX ORDER — ASPIRIN 81 MG/1
324 TABLET, CHEWABLE ORAL ONCE
Status: COMPLETED | OUTPATIENT
Start: 2021-11-17 | End: 2021-11-17

## 2021-11-17 RX ORDER — ASPIRIN 81 MG/1
81 TABLET, CHEWABLE ORAL DAILY
Status: DISCONTINUED | OUTPATIENT
Start: 2021-11-18 | End: 2021-11-17 | Stop reason: HOSPADM

## 2021-11-17 RX ORDER — SODIUM CHLORIDE 0.9 % (FLUSH) 0.9 %
10 SYRINGE (ML) INJECTION PRN
Status: DISCONTINUED | OUTPATIENT
Start: 2021-11-17 | End: 2021-11-17 | Stop reason: HOSPADM

## 2021-11-17 RX ORDER — NITROGLYCERIN 0.4 MG/1
0.4 TABLET SUBLINGUAL EVERY 5 MIN PRN
Status: DISCONTINUED | OUTPATIENT
Start: 2021-11-17 | End: 2021-11-17 | Stop reason: HOSPADM

## 2021-11-17 RX ORDER — SODIUM CHLORIDE 9 MG/ML
25 INJECTION, SOLUTION INTRAVENOUS PRN
Status: DISCONTINUED | OUTPATIENT
Start: 2021-11-17 | End: 2021-11-17 | Stop reason: HOSPADM

## 2021-11-17 RX ORDER — ONDANSETRON 2 MG/ML
4 INJECTION INTRAMUSCULAR; INTRAVENOUS EVERY 6 HOURS PRN
Status: DISCONTINUED | OUTPATIENT
Start: 2021-11-17 | End: 2021-11-17 | Stop reason: HOSPADM

## 2021-11-17 RX ORDER — CLOPIDOGREL BISULFATE 75 MG/1
75 TABLET ORAL DAILY
Status: DISCONTINUED | OUTPATIENT
Start: 2021-11-17 | End: 2021-11-17 | Stop reason: HOSPADM

## 2021-11-17 RX ORDER — ONDANSETRON 2 MG/ML
4 INJECTION INTRAMUSCULAR; INTRAVENOUS EVERY 30 MIN PRN
Status: DISCONTINUED | OUTPATIENT
Start: 2021-11-17 | End: 2021-11-17 | Stop reason: HOSPADM

## 2021-11-17 RX ORDER — ONDANSETRON 4 MG/1
4 TABLET, ORALLY DISINTEGRATING ORAL EVERY 8 HOURS PRN
Status: DISCONTINUED | OUTPATIENT
Start: 2021-11-17 | End: 2021-11-17 | Stop reason: HOSPADM

## 2021-11-17 RX ORDER — METOPROLOL SUCCINATE 25 MG/1
25 TABLET, EXTENDED RELEASE ORAL DAILY
Status: DISCONTINUED | OUTPATIENT
Start: 2021-11-17 | End: 2021-11-17 | Stop reason: HOSPADM

## 2021-11-17 RX ORDER — POTASSIUM CHLORIDE 20 MEQ/1
40 TABLET, EXTENDED RELEASE ORAL ONCE
Status: COMPLETED | OUTPATIENT
Start: 2021-11-17 | End: 2021-11-17

## 2021-11-17 RX ORDER — ROSUVASTATIN CALCIUM 20 MG/1
20 TABLET, COATED ORAL NIGHTLY
Status: DISCONTINUED | OUTPATIENT
Start: 2021-11-17 | End: 2021-11-17 | Stop reason: HOSPADM

## 2021-11-17 RX ORDER — SODIUM CHLORIDE 0.9 % (FLUSH) 0.9 %
10 SYRINGE (ML) INJECTION EVERY 12 HOURS SCHEDULED
Status: DISCONTINUED | OUTPATIENT
Start: 2021-11-17 | End: 2021-11-17 | Stop reason: HOSPADM

## 2021-11-17 RX ORDER — POTASSIUM CHLORIDE 20 MEQ/1
10 TABLET, EXTENDED RELEASE ORAL DAILY
Status: DISCONTINUED | OUTPATIENT
Start: 2021-11-17 | End: 2021-11-17 | Stop reason: HOSPADM

## 2021-11-17 RX ORDER — LISINOPRIL 5 MG/1
5 TABLET ORAL DAILY
Status: DISCONTINUED | OUTPATIENT
Start: 2021-11-17 | End: 2021-11-17 | Stop reason: HOSPADM

## 2021-11-17 RX ORDER — ACETAMINOPHEN 650 MG/1
650 SUPPOSITORY RECTAL EVERY 6 HOURS PRN
Status: DISCONTINUED | OUTPATIENT
Start: 2021-11-17 | End: 2021-11-17 | Stop reason: HOSPADM

## 2021-11-17 RX ORDER — VITAMIN B COMPLEX
4000 TABLET ORAL DAILY
Status: DISCONTINUED | OUTPATIENT
Start: 2021-11-17 | End: 2021-11-17 | Stop reason: HOSPADM

## 2021-11-17 RX ORDER — POLYETHYLENE GLYCOL 3350 17 G/17G
17 POWDER, FOR SOLUTION ORAL DAILY PRN
Status: DISCONTINUED | OUTPATIENT
Start: 2021-11-17 | End: 2021-11-17 | Stop reason: HOSPADM

## 2021-11-17 RX ORDER — ACETAMINOPHEN 325 MG/1
650 TABLET ORAL EVERY 6 HOURS PRN
Status: DISCONTINUED | OUTPATIENT
Start: 2021-11-17 | End: 2021-11-17 | Stop reason: HOSPADM

## 2021-11-17 RX ADMIN — NITROGLYCERIN 0.4 MG: 0.4 TABLET, ORALLY DISINTEGRATING SUBLINGUAL at 03:14

## 2021-11-17 RX ADMIN — IOPAMIDOL 75 ML: 755 INJECTION, SOLUTION INTRAVENOUS at 02:07

## 2021-11-17 RX ADMIN — LISINOPRIL 5 MG: 5 TABLET ORAL at 09:50

## 2021-11-17 RX ADMIN — POTASSIUM CHLORIDE 40 MEQ: 1500 TABLET, EXTENDED RELEASE ORAL at 04:42

## 2021-11-17 RX ADMIN — SODIUM CHLORIDE, PRESERVATIVE FREE 10 ML: 5 INJECTION INTRAVENOUS at 09:59

## 2021-11-17 RX ADMIN — Medication 4000 UNITS: at 09:50

## 2021-11-17 RX ADMIN — METOPROLOL SUCCINATE 25 MG: 25 TABLET, EXTENDED RELEASE ORAL at 09:51

## 2021-11-17 RX ADMIN — CLOPIDOGREL BISULFATE 75 MG: 75 TABLET ORAL at 09:50

## 2021-11-17 RX ADMIN — ASPIRIN 81 MG 324 MG: 81 TABLET ORAL at 03:14

## 2021-11-17 RX ADMIN — POTASSIUM CHLORIDE 10 MEQ: 20 TABLET, EXTENDED RELEASE ORAL at 09:50

## 2021-11-17 ASSESSMENT — ENCOUNTER SYMPTOMS
ABDOMINAL PAIN: 0
VOMITING: 1
SHORTNESS OF BREATH: 0
NAUSEA: 1
DIARRHEA: 0
BACK PAIN: 1
CHEST TIGHTNESS: 0

## 2021-11-17 ASSESSMENT — PAIN SCALES - GENERAL
PAINLEVEL_OUTOF10: 3
PAINLEVEL_OUTOF10: 3
PAINLEVEL_OUTOF10: 0

## 2021-11-17 NOTE — CONSULTS
[facility]  [phone]      Chief Complaint   Patient presents with    Chest Pain     in via [place] [facility], had stent placed yesterday. now with cp, lightheadedness, nausea, and vomiting. History of Present Illness:  Rosita Clifford is a 57 y.o. patient who presented to the hospital with complaints of chest pain and n/v. She was at home, asleep when she had sudden onset of these symptoms in the middle of the night. They did not relieve with time so she called ems who brought her to the ED. She had elective PCI to RCA 12 hours before symptom onset. Post procedure, she was asymptomatic until last night. ECG and troponin are normal. Symptoms have resolved and she now has no further complaints. I have been asked to provide consultation regarding further management and testing. Past Medical History:   has a past medical history of Breast lump or mass, Gallstones, and Hypertension. Surgical History:   has a past surgical history that includes Colonoscopy (8.1.2010); Hysterectomy (2007); Breast surgery (3063,[id number]); Cystoscopy (7.19.2007); and Cholecystectomy, laparoscopic (02/03/2017). Social History:   reports that she quit smoking about 14 years ago. Her smoking use included cigarettes. She started smoking about 40 years ago. She has a 54.00 pack-year smoking history. She has never used smokeless tobacco. She reports that she does not drink alcohol. Family History:  family history includes Cancer (age of onset: 52) in her mother; Cancer (age of onset: 48) in her brother; Cancer (age of onset: 64) in her father. Home Medications:  Were reviewed and are listed in nursing record. and/or listed below  Prior to Admission medications    Medication Sig Start Date End Date Taking?  Authorizing Provider   Cholecalciferol (VITAMIN D) 50 MCG (2000 UT) CAPS capsule Take 2 capsules by mouth daily   Yes Historical Provider, MD   Red Yeast Rice 600 MG CAPS Take 2 capsules by mouth 2 times daily   Yes Historical Provider, MD   rosuvastatin (CRESTOR) 20 MG tablet Take 1 tablet by mouth daily 11/16/21  Yes Tiago Garcia MD   lisinopril (PRINIVIL;ZESTRIL) 5 MG tablet Take 1 tablet by mouth daily 11/16/21  Yes Tiago Garcia MD   potassium chloride (KLOR-CON M) 10 MEQ extended release tablet Take 1 tablet by mouth daily 8/19/21  Yes Osmani Khanna MD   clopidogrel (PLAVIX) 75 MG tablet Take 1 tablet by mouth daily 11/16/21   Tiago Garcia MD   metoprolol succinate (TOPROL XL) 25 MG extended release tablet Take 1 tablet by mouth daily 11/16/21   Tiago Garcia MD   APPLE CIDER VINEGAR PO Take by mouth    Historical Provider, MD   aspirin 81 MG EC tablet Take 81 mg by mouth daily    Historical Provider, MD        Current Medications:  Current Facility-Administered Medications   Medication Dose Route Frequency Provider Last Rate Last Admin    nitroGLYCERIN (NITROSTAT) SL tablet 0.4 mg  0.4 mg SubLINGual Q5 Min PRN Nimesh Johnson MD   0.4 mg at 11/17/21 0314    Vitamin D (CHOLECALCIFEROL) tablet 4,000 Units  4,000 Units Oral Daily Esther Lemus MD   4,000 Units at 11/17/21 0950    clopidogrel (PLAVIX) tablet 75 mg  75 mg Oral Daily Esther Lemus MD   75 mg at 11/17/21 0950    lisinopril (PRINIVIL;ZESTRIL) tablet 5 mg  5 mg Oral Daily Esther Lemus MD   5 mg at 11/17/21 0950    metoprolol succinate (TOPROL XL) extended release tablet 25 mg  25 mg Oral Daily Esther Lemus MD   25 mg at 11/17/21 0951    potassium chloride (KLOR-CON M) extended release tablet 10 mEq  10 mEq Oral Daily Esther Lemus MD   10 mEq at 11/17/21 0950    rosuvastatin (CRESTOR) tablet 20 mg  20 mg Oral Nightly Esther Lemus MD        sodium chloride flush 0.9 % injection 10 mL  10 mL IntraVENous 2 times per day Esther Lemus MD   10 mL at 11/17/21 0959    sodium chloride flush 0.9 % injection 10 mL  10 mL IntraVENous PRN Esther Lemus MD        0.9 % sodium chloride infusion  25 mL IntraVENous PRN Shae Estevez MD        ondansetron (ZOFRAN-ODT) disintegrating tablet 4 mg  4 mg Oral Q8H PRN Shae Estevez MD        Or    ondansetron (ZOFRAN) injection 4 mg  4 mg IntraVENous Q6H PRN Shae Estevez MD        acetaminophen (TYLENOL) tablet 650 mg  650 mg Oral Q6H PRN Shae Estevez MD        Or    acetaminophen (TYLENOL) suppository 650 mg  650 mg Rectal Q6H PRN Shae Estevez MD        polyethylene glycol (GLYCOLAX) packet 17 g  17 g Oral Daily PRN MD Kami Worthyartemio Desouza [START ON 11/18/2021] aspirin chewable tablet 81 mg  81 mg Oral Daily Abidemi B Papo Khoury MD        enoxaparin (LOVENOX) injection 40 mg  40 mg SubCUTAneous Daily Shae Estevez MD        nitroGLYCERIN (NITROSTAT) SL tablet 0.4 mg  0.4 mg SubLINGual Q5 Min PRN Shae Estevez MD        perflutren lipid microspheres (DEFINITY) injection 1.65 mg  1.5 mL IntraVENous ONCE PRN David Vieyra MD            Allergies:  Penicillins, Levaquin [levofloxacin in d5w], and Morphine     Review of Systems:     · Constitutional: there has been no unanticipated weight loss. There's been no change in energy level, sleep pattern, or activity level. · Eyes: No visual changes or diplopia. No scleral icterus. · ENT: No Headaches, hearing loss or vertigo. No mouth sores or sore throat. · Cardiovascular: Reviewed in HPI  · Respiratory: No cough or wheezing, no sputum production. No hematemesis. · Gastrointestinal: No abdominal pain, appetite loss, blood in stools. No change in bowel or bladder habits. · Genitourinary: No dysuria, trouble voiding, or hematuria. · Musculoskeletal:  No gait disturbance, weakness or joint complaints. · Integumentary: No rash or pruritis. · Neurological: No headache, diplopia, change in muscle strength, numbness or tingling. No change in gait, balance, coordination, mood, affect, memory, mentation, behavior.   · Psychiatric: No anxiety, no depression. · Endocrine: No malaise, fatigue or temperature intolerance. No excessive thirst, fluid intake, or urination. No tremor. · Hematologic/Lymphatic: No abnormal bruising or bleeding, blood clots or swollen lymph nodes. · Allergic/Immunologic: No nasal congestion or hives.   ·     Physical Examination:    Vitals:    11/17/21 0700   BP: (!) 123/55   Pulse: 69   Resp: 18   Temp: 98 °F (36.7 °C)   SpO2: 94%    Weight: 197 lb (89.4 kg)         General Appearance:  Alert, cooperative, no distress, appears stated age   Head:  Normocephalic, without obvious abnormality, atraumatic   Eyes:  PERRL, conjunctiva/corneas clear       Nose: Nares normal, no drainage or sinus tenderness   Throat: Lips, mucosa, and tongue normal   Neck: Supple, symmetrical, trachea midline, no adenopathy, thyroid: not enlarged, symmetric, no tenderness/mass/nodules, no carotid bruit or JVD       Lungs:   Clear to auscultation bilaterally, respirations unlabored   Chest Wall:  No tenderness or deformity   Heart:  Regular rate and rhythm, S1, S2 normal, no murmur, rub or gallop   Abdomen:   Soft, non-tender, bowel sounds active all four quadrants,  no masses, no organomegaly           Extremities: Extremities normal, atraumatic, no cyanosis or edema   Pulses: 2+ and symmetric   Skin: Skin color, texture, turgor normal, no rashes or lesions   Pysch: Normal mood and affect   Neurologic: Normal gross motor and sensory exam.         Labs  CBC:   Lab Results   Component Value Date    WBC 7.9 11/17/2021    RBC 4.55 11/17/2021    HGB 12.9 11/17/2021    HCT 38.4 11/17/2021    MCV 84.4 11/17/2021    RDW 13.1 11/17/2021     11/17/2021     CMP:    Lab Results   Component Value Date     11/17/2021    K 3.4 11/17/2021     11/17/2021    CO2 27 11/17/2021    BUN 16 11/17/2021    CREATININE <0.5 11/17/2021    GFRAA >60 11/17/2021    GFRAA >60 06/13/2013    AGRATIO 1.3 11/17/2021    LABGLOM >60 11/17/2021    GLUCOSE 125 11/17/2021    PROT 6.9 11/17/2021    PROT 7.4 07/31/2010    CALCIUM 8.9 11/17/2021    BILITOT 0.3 11/17/2021    ALKPHOS 63 11/17/2021    AST 16 11/17/2021    ALT 16 11/17/2021     PT/INR:  No results found for: PTINR  Lab Results   Component Value Date    TROPONINI <0.01 11/17/2021       EKG:  I have reviewed EKG with the following interpretation:  Impression:  normal    Pt has CAD with following history:  CABG: (date/details),   Valve replacement (date/details)   GXT/Myoview/Lexiscan: (date)  (results)   Stress/echo: (date) (result)   CATH/PCI:  (date)- (results)  - (# and type of stents) -   Cardiac Cath PCI FFR:  Anatomy:   LM-nml   LAD-nml  Cx-nml  OM- prox 50%  RCA-mid 80% prox 20%  RPDA- nml  LVEF- 60  LVG- nml  LVEDP- 9     LCX FFR 0.96  RCA FFR 0.84  RCA DFR 0.86     Intervention  ~Successful PCI to RCA with 4.0x18 AARON. PD with 4.0x12 NC to 24atm Excellent Result. ECHO: (date) results EF    %. MALAIKA (date) (results)  EP procedures/studies/ablation:  (specific data). Device information:  Event monitor: (date/results)    All testing and labs listed below were personally reviewed. Assessment  Patient Active Problem List   Diagnosis    Hyperlipidemia    Obesity    Essential hypertension    Breast lump or mass    Calculus of gallbladder without cholecystitis    Acute cholecystitis due to biliary calculus    Family history of lung cancer    Family history of essential hypertension    Multinodular goiter    Abnormal stress test    Chest pain         Plan:    I had the opportunity to review the clinical symptoms and presentation of Khai Sandoval. Assessment/Plan:  Active Problems:    Chest pain  Plan: atypical chest pain. Possibly from post PCI coronary stretching or GERD or anesthesia reaction. No resolved. No further testing or treatment at this time    CAD: cont DAPT, statin, beta blocker, ace/arb. OK to discharge home. FU as outpatient.     I will address the patient's cardiac risk factors and adjusted pharmacologic treatment as needed. In addition, I have reinforced the need for patient directed risk factor modification. Tobacco use was discussed with the patient and educated on the negative effects. I have asked the patient to not utilize these agents. Thank you for allowing to us to participate in the care or Shar Braun. Further evaluation will be based upon the patient's clinical course and testing results. All questions and concerns were addressed to the patient/family. Alternatives to my treatment were discussed. The note was completed using EMR. Every effort was made to ensure accuracy; however, inadvertent computerized transcription errors may be present.     Jonathan Andino MD, MD 11/17/2021 10:15 AM

## 2021-11-17 NOTE — PLAN OF CARE
Data- discharge order received, pt verbalized agreement to discharge, disposition to previous residence, no needs for HHC/DME. Action- discharge instructions prepared and given to PT, pt verbalized understanding. Medication information packet given r/t NEW and/or CHANGED prescriptions emphasizing name/purpose/side effects, pt verbalized understanding. Discharge instruction summary: Diet- General, Activity- As tolerated, Primary Care Physician as follows: Deborah Coon -[phone] f/u appointment In one week, immunizations reviewed and Refused, prescription medications filled none. I    1. WEIGHT: Admit Weight: 197 lb (89.4 kg) (11/17/21 0108)        Today  Weight: 197 lb (89.4 kg) (11/17/21 0108)       2. O2 SAT.: SpO2: 94 % (11/17/21 0700)    Response- Pt belongings gathered, IV removed. Disposition is home (no HHC/DME needs), transported with , taken to lobby via w/c w/ PCA, no complications.

## 2021-11-17 NOTE — PROGRESS NOTES
Discussed with nursing staff to ambulate patient on room air and inform me of lowest oxygen saturation.     SIGNED: Ck Coyne MD, MPH . 11/17/2021

## 2021-11-17 NOTE — DISCHARGE SUMMARY
[facility]    [facility] Demographics    Patient. Ruthann Wharton  Date of Birth. 1960  MRN. [id number]     Primary care provider. Reji Blevins MD  (Tel: [phone])    Admit date: 11/17/2021    Discharge date (blank if same as Note Date): Note Date: 11/17/2021     Reason for Hospitalization. Chief Complaint   Patient presents with    Chest Pain     in via [place] [facility], had stent placed yesterday. now with cp, lightheadedness, nausea, and vomiting. Significant Findings. Active Problems:    Chest pain    Coronary artery disease involving native coronary artery of native heart without angina pectoris  Resolved Problems:    * No resolved hospital problems. *       Problems and results from this hospitalization that need follow up. 1. Consideration for sleep study. Significant test results and incidental findings. CXR 11/17/2021:  No acute cardiopulmonary disease. CTA Chest/Abd/Pelvis 11/17/2021:  1. Mild ectasia of the ascending aorta.  Otherwise normal caliber   thoracoabdominal aorta without acute aortic pathology. 2. Dilated pulmonary artery which can be seen with pulmonary hypertension. 3. Otherwise no acute findings in the chest, abdomen or pelvis. Invasive procedures and treatments. 1. None     Problem-based Hospital Course. Patient is a 56 yo female with hx HTN, CAD with recent PCI to RCA (11/6/2021). She presented to hospital with intermittent chest pain, resolved with NTG in ER. There were no acute changes on EKG, troponin negative. There has been no recurrence of pain. Patient evaluated by cardiology and no further testing indicated, okay to DC. Per H&P, noted hypoxia in ER. No further documentation of this. No evidence of hypoxia since admitted to floor. Suspect likely secondary to faulty oximetry reading.  Underlying sleep apnea unable to be excluded. Patient arrived to floor to late for overnight oximetry. Recommend follow up with PCP for consideration sleep study. Patient reports she feels good. No further episodes of chest pain. Denies shortness of breath. Adrianna Members for Dc home. Reviewed DC plans, follow up and medications. Expresses understanding. Questions answered. Consults. IP CONSULT TO CARDIOLOGY  IP CONSULT TO CARDIOLOGY    Physical examination on discharge day. BP (!) 123/55   Pulse 69   Temp 98 °F (36.7 °C) (Oral)   Resp 18   Ht 5' 4\" (1.626 m)   Wt 197 lb (89.4 kg)   SpO2 94%   BMI 33.81 kg/m²   General appearance. Alert. Looks comfortable. HEENT. Sclera clear. Moist mucus membranes. Cardiovascular. Regular rate and rhythm, normal S1, S2. No murmur. Respiratory. Not using accessory muscles. Clear to auscultation bilaterally, no wheeze. Gastrointestinal. Abdomen soft, non-tender, not distended, normal bowel sounds  Neurology. Facial symmetry. No speech deficits. Moving all extremities equally. Extremities. No edema in lower extremities. Skin. Warm, dry, normal turgor    Condition at time of discharge stable    Medication instructions provided to patient at discharge.      Medication List      CONTINUE taking these medications    aspirin 81 MG EC tablet     clopidogrel 75 MG tablet  Commonly known as: Plavix  Take 1 tablet by mouth daily     lisinopril 5 MG tablet  Commonly known as: PRINIVIL;ZESTRIL  Take 1 tablet by mouth daily     metoprolol succinate 25 MG extended release tablet  Commonly known as: TOPROL XL  Take 1 tablet by mouth daily     potassium chloride 10 MEQ extended release tablet  Commonly known as: KLOR-CON M  Take 1 tablet by mouth daily     Red Yeast Rice 600 MG Caps     rosuvastatin 20 MG tablet  Commonly known as: CRESTOR  Take 1 tablet by mouth daily     vitamin D 50 MCG (2000 UT) Caps capsule        STOP taking these medications    APPLE CIDER VINEGAR PO            Discharge recommendations given to patient. Follow Up. PCP in 2 weeks, cardiology 12/10   Disposition. home  Activity. activity as tolerated  Diet: ADULT DIET; Regular; Low Fat/Low Chol/High Fiber/DOMINGO      Spent 40 minutes in discharge process.     Signed:  DIMAS Gallardo CNP     11/17/2021 11:58 AM

## 2021-11-17 NOTE — TELEPHONE ENCOUNTER
An[facility] Transitions Initial Follow Up Call    Outreach made within 2 business days of discharge: Yes    Patient: Killian Moody Patient : 1960   MRN: [id number]  Reason for Admission: There are no discharge diagnoses documented for the most recent discharge.   Discharge Date: 21       Spoke with: LVM need Trans appoint and Ma needs to ask the questions

## 2021-11-17 NOTE — ED PROVIDER NOTES
[facility] [facility]        Pt Name: Jeremy Espino  MRN: [id number]  [place] 1960  Date of evaluation: 11/17/2021  Provider: DIMAS Arana - BRIDGET  PCP: Osmani Khanna MD  Note Started: 1:20 AM EST        I have seen and evaluated this patient with my supervising physician Nimesh Johnson MD.    [address] [place] [address]       Chief Complaint   Patient presents with    Chest Pain     in via [address] [place] [address] Drive [facility], had stent placed yesterday. now with cp, lightheadedness, nausea, and vomiting. HISTORY OF PRESENT ILLNESS   (Location, Timing/Onset, Context/Setting, Quality, Duration, Modifying Factors, Severity, Associated Signs and Symptoms)  Note limiting factors. Chief Complaint: chest pain, nausea, vomiting, headache     Jeremy Espino is a 57 y.o. female who presents to the emergency department complaining of left-sided chest pain with radiation to the back, nausea, vomiting, and headache. The patient reports that she had a cardiac catheterization with RCA PCI at 930 Tuesday morning, 11/16/2021 with Dr. Narinder Winslow. The patient reports that she was discharged home by 3 PM and was feeling all right. Reports that when she awoke this evening that she had headache and felt like she may vomit. Reports onset of chest pain shortly thereafter without mitigating or exacerbating factors, states that the pain is intermittent lasting only 15 minutes or less but then does return. Reports several episodes of nonbilious nonbloody emesis. Denies any headache, fever, lightheadedness, dizziness, visual disturbances. No neck pain. No shortness of breath, cough, or congestion. No diarrhea, constipation, or dysuria. No rash. Nursing Notes were all reviewed and agreed with or any disagreements were addressed in the HPI.     REVIEW OF SYSTEMS    (2-9 systems for level 4, 10 or more for level 5)     Review of Systems   Constitutional: Negative for activity change, chills and fever. Respiratory: Negative for chest tightness and shortness of breath. Cardiovascular: Positive for chest pain. Gastrointestinal: Positive for nausea and vomiting. Negative for abdominal pain and diarrhea. Genitourinary: Negative for dysuria. Musculoskeletal: Positive for back pain. Neurological: Positive for headaches. All other systems reviewed and are negative. Positives and Pertinent negatives as per HPI. Except as noted above in the ROS, all other systems were reviewed and negative.        PAST MEDICAL HISTORY     Past Medical History:   Diagnosis Date    Breast lump or mass     Gallstones     Hypertension          SURGICAL HISTORY     Past Surgical History:   Procedure Laterality Date    BREAST SURGERY  [id number]    CHOLECYSTECTOMY, LAPAROSCOPIC  02/03/2017    COLONOSCOPY  8.1.2010    CYSTOSCOPY  7.19.2007    HYSTERECTOMY  2007         CURRENTMEDICATIONS       Previous Medications    APPLE CIDER VINEGAR PO    Take by mouth    ASPIRIN 81 MG EC TABLET    Take 81 mg by mouth daily    CHOLECALCIFEROL (VITAMIN D) 50 MCG (2000 UT) CAPS CAPSULE    Take 2 capsules by mouth daily    CLOPIDOGREL (PLAVIX) 75 MG TABLET    Take 1 tablet by mouth daily    LISINOPRIL (PRINIVIL;ZESTRIL) 5 MG TABLET    Take 1 tablet by mouth daily    METOPROLOL SUCCINATE (TOPROL XL) 25 MG EXTENDED RELEASE TABLET    Take 1 tablet by mouth daily    POTASSIUM CHLORIDE (KLOR-CON M) 10 MEQ EXTENDED RELEASE TABLET    Take 1 tablet by mouth daily    RED YEAST RICE 600 MG CAPS    Take 2 capsules by mouth 2 times daily    ROSUVASTATIN (CRESTOR) 20 MG TABLET    Take 1 tablet by mouth daily         ALLERGIES     Levaquin [levofloxacin in d5w], Morphine, and Penicillins    FAMILYHISTORY       Family History   Problem Relation Age of Onset    Cancer Mother 52        UTERINE AND LUNG, SMOKER    Cancer Father 64        THROAT CANCER    Cancer Brother 48        THROAT          SOCIAL HISTORY Social History     Tobacco Use    Smoking status: Former Smoker     Packs/day: 2.00     Years: 27.00     Pack years: 54.00     Types: Cigarettes     Start date: 1981     Quit date: 2007     Years since quittin.8    Smokeless tobacco: Never Used    Tobacco comment: spoke with the patient and updated smoking history   Vaping Use    Vaping Use: Never used   Substance Use Topics    Alcohol use: No     Alcohol/week: 0.0 standard drinks    Drug use: Not on file       SCREENINGS             PHYSICAL EXAM    (up to 7 for level 4, 8 or more for level 5)     ED Triage Vitals [21 0108]   BP Temp Temp Source Pulse Resp SpO2 Height Weight   (!) 161/85 98 °F (36.7 °C) Oral 88 15 92 % 5' 4\" (1.626 m) 197 lb (89.4 kg)       Physical Exam  Vitals and nursing note reviewed. Constitutional:       Appearance: She is well-developed. She is not diaphoretic. HENT:      Head: Normocephalic and atraumatic. Right Ear: External ear normal.      Left Ear: External ear normal.   Eyes:      General:         Right eye: No discharge. Left eye: No discharge. Neck:      Vascular: No JVD. Cardiovascular:      Rate and Rhythm: Normal rate and regular rhythm. Pulses: Normal pulses. Heart sounds: Normal heart sounds. Pulmonary:      Effort: Pulmonary effort is normal. No respiratory distress. Breath sounds: Normal breath sounds. Chest:      Chest wall: No tenderness. Abdominal:      Palpations: Abdomen is soft. Tenderness: There is no abdominal tenderness. Musculoskeletal:         General: Normal range of motion. Cervical back: Normal range of motion and neck supple. Skin:     General: Skin is warm and dry. Coloration: Skin is not pale. Neurological:      Mental Status: She is alert and oriented to person, place, and time.    Psychiatric:         Behavior: Behavior normal.         DIAGNOSTIC RESULTS   LABS:    Labs Reviewed   COMPREHENSIVE METABOLIC PANEL - Abnormal; Notable for the following components:       Result Value    Potassium 3.4 (*)     Glucose 125 (*)     CREATININE <0.5 (*)     All other components within normal limits    Narrative:     Performed at:  [facility] [address], [place], [facility]   Phone [phone]   CBC WITH AUTO DIFFERENTIAL    Narrative:     Performed at:  [facility] [address], [place], [address] [facility]   Phone [phone]   TROPONIN    Narrative:     Performed at:  [facility] [address], [place], [address] [place]   Phone [phone]   BRAIN NATRIURETIC PEPTIDE    Narrative:     Performed at:  [facility] [address], [place], [facility] [place]   Phone [phone]   BLOOD GAS, ARTERIAL       When ordered only abnormal lab results are displayed. All other labs were within normal range or not returned as of this dictation. EKG: When ordered, EKG's are interpreted by the Emergency Department Physician in the absence of a cardiologist.  Please see their note for interpretation of EKG. RADIOLOGY:   Non-plain film images such as CT, Ultrasound and MRI are read by the radiologist. Plain radiographic images are visualized and preliminarily interpreted by the ED Provider with the below findings:        Interpretation per the Radiologist below, if available at the time of this note:    XR CHEST PORTABLE   Preliminary Result   No acute cardiopulmonary disease. CTA CHEST ABDOMEN PELVIS W CONTRAST   Preliminary Result   1. Mild ectasia of the ascending aorta. Otherwise normal caliber   thoracoabdominal aorta without acute aortic pathology. 2. Dilated pulmonary artery which can be seen with pulmonary hypertension. 3. Otherwise no acute findings in the chest, abdomen or pelvis. No results found.         PROCEDURES   Unless otherwise noted below, none     Procedures    CRITICAL specified.     DISCHARGE MEDICATIONS:  New Prescriptions    No medications on file       DISCONTINUED MEDICATIONS:  Discontinued Medications    No medications on file              (Please note that portions of this note were completed with a voice recognition program.  Efforts were made to edit the dictations but occasionally words are mis-transcribed.)    DIMAS Cortés CNP (electronically signed)            DIMAS Cortés CNP  11/17/21 Chungse 50, APRN - CNP  11/17/21 Tuan 50, APRN - CNP  11/17/21 0333

## 2021-11-17 NOTE — ED PROVIDER NOTES
I independently performed a history and physical on [facility]. All diagnostic, treatment, and disposition decisions were made by myself in conjunction with the advanced practice provider. Briefly, this is a 57 y.o. female here for chest discomfort. Associated with lightheadedness, nausea, vomiting and headache. Had cardiac catheterization yesterday with Dr. Laury Velázquez with stent placement. No fevers or chills. No cough. No neck stiffness. Chest discomfort does seem to radiate towards the back. On exam,   General: Patient is in no acute distress  Skin: No cyanosis  HEENT: Moist mucous membranes  Heart: Regular rate, regular rhythm  Lung: No respiratory distress  Abdomen: Soft, nontender  Neuro: Moving all extremities, no facial droop, no slurred speech, answers questions appropriately        EKG  The Ekg interpreted by me in the absence of a cardiologist shows. Normal sinus rhythm  No acute ST changes   HR 76  No significant EKG changes compared to study in 11/21      Screenings            MDM  Briefly, this is a 57 y.o. female here for chest discomfort, headache, nausea, vomiting. Recently had cardiac catheterization. Due to recent arterial manipulation, will obtain CTA to evaluate for dissection. Unlikely intracranial bleed given presence of concurrent chest discomfort. No focal neuro deficits. Do not believe that this is due to stroke. May be coronary artery vasospasm. May be ischemic in etiology. EKG shows no acute ischemic changes. Will obtain troponin. Labs and CT unremarkable for dissection. Was found to be hypoxic to 88% on room air. She does say that she has a small amount of shortness of breath but it is very mild. Lungs clear to auscultation. No believe that this is asthma/COPD. BNP is unremarkable and she does not appear overloaded. Unclear etiology. No PE found on CT.  spoke with cardiology Dr. Vibha Culp who had no further recommendations         Patient Referrals:  No follow-up provider specified. Discharge Medications:  New Prescriptions    No medications on file       FINAL IMPRESSION  1. Chest pain, unspecified type        Blood pressure (!) 161/85, pulse 88, temperature 98 °F (36.7 °C), temperature source Oral, resp. rate 15, height 5' 4\" (1.626 m), weight 197 lb (89.4 kg), SpO2 92 %. For further details of [facility] emergency department encounter, please see documentation by advanced practice provider, Thao Garcia.         Josefina Doan MD  11/17/21 Chrissie Rodriguez MD  11/17/21 5560

## 2021-11-17 NOTE — TELEPHONE ENCOUNTER
[facility] Transitions Initial Follow Up Call    Outreach made within 2 business days of discharge: Yes    Patient: Michael Madison Patient : 1960   MRN: [id number]  Reason for Admission: There are no discharge diagnoses documented for the most recent discharge. Discharge Date: 21       Spoke with: Miya Alonso    Discharge department/facility: [facility] Interactive Patient Contact:  Was patient able to fill all prescriptions: Yes  Was patient instructed to bring all medications to the follow-up visit: Yes  Is patient taking all medications as directed in the discharge summary?  Yes  Does patient understand their discharge instructions: Yes  Does patient have questions or concerns that need addressed prior to 7-14 day follow up office visit: no    Scheduled appointment with PCP within 7-14 days    Follow Up  Future Appointments   Date Time Provider Celestine Dominguez   2021 10:30 AM MD STEPHIE Linares [facility] [place] - [facility]   12/10/2021  1:30 PM DIMAS Ho CNP [facility] Cardio [facility]   2022  9:20 AM Maryana Posada MD [facility] ENDO [facility]       Cesilia Simons MA

## 2021-11-17 NOTE — H&P
catheterization on November 6, 2021 (found to have mid RCA 80% proximal RCA 20% disease, proximal OM 50% disease, status post PCI to RCA), who presents to the emergency room with complaints of chest pain that has been intermittent, onset since around 11 PM last night, resolved following administration of nitroglycerin in the emergency room. She has had some lightheadedness, nausea, vomiting episodes. She reports some shortness of breath. She does not have fever or chills. EKG and troponin obtained in the emergency room were unremarkable for acute pathology. Patient was also noted to be hypoxic at rest in the emergency room. However, when ambulated, oxygen saturation was 95% or above. At the time of my evaluation, patient was on room air, lowest oxygen saturation around 93%. Past Medical History:      Diagnosis Date    Breast lump or mass     Gallstones     Hypertension        Past Surgical History:      Procedure Laterality Date    BREAST SURGERY  [id number]    CHOLECYSTECTOMY, LAPAROSCOPIC  02/03/2017    COLONOSCOPY  8.1.2010    CYSTOSCOPY  7.19.2007    HYSTERECTOMY  2007       Medications (prior to admission):  Prior to Admission medications    Medication Sig Start Date End Date Taking?  Authorizing Provider   Cholecalciferol (VITAMIN D) 50 MCG (2000 UT) CAPS capsule Take 2 capsules by mouth daily    Historical Provider, MD   Red Yeast Rice 600 MG CAPS Take 2 capsules by mouth 2 times daily    Historical Provider, MD   rosuvastatin (CRESTOR) 20 MG tablet Take 1 tablet by mouth daily 11/16/21   Princess Stevens MD   clopidogrel (PLAVIX) 75 MG tablet Take 1 tablet by mouth daily 11/16/21   Princess Stevens MD   metoprolol succinate (TOPROL XL) 25 MG extended release tablet Take 1 tablet by mouth daily 11/16/21   Princess Stevens MD   lisinopril (PRINIVIL;ZESTRIL) 5 MG tablet Take 1 tablet by mouth daily 11/16/21   Princess Stevens MD   APPLE CIDER VINEGAR PO Take by mouth    Historical Provider, MD potassium chloride (KLOR-CON M) 10 MEQ extended release tablet Take 1 tablet by mouth daily 8/19/21   Reid Lang MD   aspirin 81 MG EC tablet Take 81 mg by mouth daily    Historical Provider, MD       Allergy(ies):  Levaquin [levofloxacin in d5w], Morphine, and Penicillins    Social History:  TOBACCO:  reports that she quit smoking about 14 years ago. Her smoking use included cigarettes. She started smoking about 40 years ago. She has a 54.00 pack-year smoking history. She has never used smokeless tobacco.  ETOH:  reports no history of alcohol use. Family History:      Problem Relation Age of Onset    Cancer Mother 52        UTERINE AND LUNG, SMOKER    Cancer Father 64        THROAT CANCER    Cancer Brother 48        THROAT       Review of Systems:  Pertinent positives are listed in HPI. At least 10-point ROS reviewed and were negative. Vitals and physical examination:  /67   Pulse 69   Temp 98 °F (36.7 °C) (Oral)   Resp 15   Ht 5' 4\" (1.626 m)   Wt 197 lb (89.4 kg)   SpO2 92%   BMI 33.81 kg/m²   Gen/overall appearance: Not in acute distress. Alert. Oriented x3. Head: Normocephalic, atraumatic  Eyes: EOMI, good acuity  ENT: Oral mucosa moist  Neck: No JVD, thyromegaly  CVS: Nml S1S2, no MRG, RRR  Pulm: Clear bilaterally. No crackles/wheezes  Gastrointestinal: Soft, NT/ND, +BS  Musculoskeletal: No edema. Warm  Neuro: No focal deficit. Moves extremity spontaneously. Psychiatry: Appropriate affect. Not agitated. Skin: Warm, dry with normal turgor.  No rash  Capillary refill: Brisk,< 3 seconds   Peripheral Pulses: +2 palpable, equal bilaterally       Labs/imaging/EKG:  CBC:   Recent Labs     11/16/21  0816 11/17/21 0237   WBC 6.2 7.9   HGB 14.3 12.9    257     BMP:    Recent Labs     11/16/21  0816 11/17/21 0237    137   K 3.6 3.4*    102   CO2 26 27   BUN 16 16   CREATININE <0.5* <0.5*   GLUCOSE 114* 125*     Hepatic:   Recent Labs     11/17/21 0237   AST 16   ALT 16   BILITOT 0.3   ALKPHOS 63       XR CHEST PORTABLE    Result Date: 11/17/2021  No acute cardiopulmonary disease. CTA CHEST ABDOMEN PELVIS W CONTRAST    Result Date: 11/17/2021  1. Mild ectasia of the ascending aorta. Otherwise normal caliber thoracoabdominal aorta without acute aortic pathology. 2. Dilated pulmonary artery which can be seen with pulmonary hypertension. 3. Otherwise no acute findings in the chest, abdomen or pelvis. EKG: Normal sinus rhythm, rate 75 beats per minutes. No acute ST/T changes. I reviewed EKG. Discussed with ER nurse practitioner and ER physician.       Thank you Kyler Gonzalez MD for the opportunity to be involved in this patient's care.    -----------------------------  My Basurto MD  [facility]

## 2021-11-17 NOTE — ED NOTES
Bed: 09  Expected date:   Expected time:   Means of arrival:   Comments:  Esperanza Castle RN  11/17/21 0104

## 2021-11-19 ENCOUNTER — OFFICE VISIT (OUTPATIENT)
Dept: FAMILY MEDICINE CLINIC | Age: 61
End: 2021-11-19
Payer: COMMERCIAL

## 2021-11-19 VITALS
WEIGHT: 194.8 LBS | DIASTOLIC BLOOD PRESSURE: 70 MMHG | SYSTOLIC BLOOD PRESSURE: 150 MMHG | BODY MASS INDEX: 33.44 KG/M2 | OXYGEN SATURATION: 98 % | HEART RATE: 64 BPM | TEMPERATURE: 96.4 F

## 2021-11-19 DIAGNOSIS — G47.9 SLEEP DIFFICULTIES: ICD-10-CM

## 2021-11-19 DIAGNOSIS — E04.2 MULTINODULAR GOITER: ICD-10-CM

## 2021-11-19 DIAGNOSIS — I10 ESSENTIAL HYPERTENSION: ICD-10-CM

## 2021-11-19 DIAGNOSIS — I25.10 CORONARY ARTERY DISEASE INVOLVING NATIVE CORONARY ARTERY OF NATIVE HEART WITHOUT ANGINA PECTORIS: ICD-10-CM

## 2021-11-19 DIAGNOSIS — Z09 HOSPITAL DISCHARGE FOLLOW-UP: Primary | ICD-10-CM

## 2021-11-19 PROCEDURE — 1111F DSCHRG MED/CURRENT MED MERGE: CPT | Performed by: FAMILY MEDICINE

## 2021-11-19 PROCEDURE — 99495 TRANSJ CARE MGMT MOD F2F 14D: CPT | Performed by: FAMILY MEDICINE

## 2021-11-19 RX ORDER — LISINOPRIL 10 MG/1
10 TABLET ORAL DAILY
Qty: 90 TABLET | Refills: 1 | Status: SHIPPED | OUTPATIENT
Start: 2021-11-19 | End: 2021-12-10

## 2021-11-19 RX ORDER — FAMOTIDINE 20 MG/1
20 TABLET, FILM COATED ORAL 2 TIMES DAILY
Qty: 60 TABLET | Refills: 3 | Status: SHIPPED | OUTPATIENT
Start: 2021-11-19 | End: 2022-07-14

## 2021-11-19 NOTE — PROGRESS NOTES
[facility]-Discharge Transitional Care Management Services or Hospital Follow Up      Elvia Purdy   YOB: 1960    Date of Office Visit:  11/19/2021  Date of Hospital Admission: 11/17/21  Date of Hospital Discharge: 11/17/21  Risk of hospital readmission (high >=14%.  Medium >=10%) :No data recorded    Care management risk score Rising risk (score 2-5) and Complex Care (Scores >=6): 1     Non face to face  following discharge, date last encounter closed (first attempt may have been earlier): 11/17/2021  4:26 PM    Call initiated 2 business days of discharge: Yes     Medications reviewed with patient in detail  To go back to the hospital for chest pain and headache, all was okay    Is reviewed with patient in detail  Elevated systolic blood pressure, will increase her lisinopril to 10 mg and have her monitor blood pressure at home  Not ready to do flu vaccine or Covid booster until she is feeling better    Patient is not a good sleeper, sleep difficulties, will order sleep study    Agreeable to taking her meds  Recommend statin and aspirin and metoprolol after dinner    Take lisinopril and Plavix after breakfast    Patient Active Problem List   Diagnosis    Hyperlipidemia    Obesity    Essential hypertension    Breast lump or mass    Calculus of gallbladder without cholecystitis    Acute cholecystitis due to biliary calculus    Family history of lung cancer    Family history of essential hypertension    Multinodular goiter    Abnormal stress test    Chest pain    Coronary artery disease involving native coronary artery of native heart without angina pectoris       Allergies   Allergen Reactions    Penicillins Anaphylaxis    Levaquin [Levofloxacin In D5w] Other (See Comments)     Joint pain    Morphine Rash       Medications listed as ordered at the time of discharge from hospital  @DISCHARGEMEDSLIST(<NOROUTINE> error)@      Medications marked \"taking\" at this time  Outpatient Medications Marked as Taking for the 11/19/21 encounter (Office Visit) with Osmani Khanna MD   Medication Sig Dispense Refill    lisinopril (PRINIVIL;ZESTRIL) 10 MG tablet Take 1 tablet by mouth daily 90 tablet 1    famotidine (PEPCID) 20 MG tablet Take 1 tablet by mouth 2 times daily 60 tablet 3    Cholecalciferol (VITAMIN D) 50 MCG (2000 UT) CAPS capsule Take 2 capsules by mouth daily      rosuvastatin (CRESTOR) 20 MG tablet Take 1 tablet by mouth daily 90 tablet 1    clopidogrel (PLAVIX) 75 MG tablet Take 1 tablet by mouth daily 90 tablet 3    metoprolol succinate (TOPROL XL) 25 MG extended release tablet Take 1 tablet by mouth daily 90 tablet 1    potassium chloride (KLOR-CON M) 10 MEQ extended release tablet Take 1 tablet by mouth daily 90 tablet 3    aspirin 81 MG EC tablet Take 81 mg by mouth daily          Medications patient taking as of now reconciled against medications ordered at time of hospital discharge: Yes    Chief Complaint   Patient presents with    Follow-Up from Hospital       History of Present illness - Follow up of Hospital diagnosis(es): Coronary artery disease, right stent    Inpatient course: Discharge summary reviewed- see chart. Interval history/Current status: Stable    A comprehensive review of systems was negative except for what was noted in the HPI. Vitals:    11/19/21 1019 11/19/21 1051   BP: (!) 163/75 (!) 150/70   Pulse: 64    Temp: 96.4 °F (35.8 °C)    SpO2: 98%    Weight: 194 lb 12.8 oz (88.4 kg)      Body mass index is 33.44 kg/m².    Wt Readings from Last 3 Encounters:   11/19/21 194 lb 12.8 oz (88.4 kg)   11/17/21 197 lb (89.4 kg)   11/16/21 197 lb (89.4 kg)     BP Readings from Last 3 Encounters:   11/19/21 (!) 150/70   11/17/21 (!) 123/55   11/16/21 (!) 166/80        Physical Exam:  General Appearance: alert and oriented to person, place and time, well developed and well- nourished, in no acute distress, weight noted, somewhat anxious about events of past week  Skin: warm and dry, no rash or erythema  Head: normocephalic and atraumatic  Eyes: pupils equal, round, and reactive to light, extraocular eye movements intact, conjunctivae normal  ENT: tympanic membrane, external ear and ear canal normal bilaterally, nose without deformity, nasal mucosa and turbinates normal without polyps  Neck: supple and non-tender without mass, no thyromegaly or thyroid nodules, no cervical lymphadenopathy  Pulmonary/Chest: clear to auscultation bilaterally- no wheezes, rales or rhonchi, normal air movement, no respiratory distress  Cardiovascular: normal rate, regular rhythm, normal S1 and S2, no murmurs, rubs, clicks, or gallops, distal pulses intact, no carotid bruits  Abdomen: soft, non-tender, non-distended, normal bowel sounds, no masses or organomegaly  Breast: Not examined today  Extremities: no cyanosis, clubbing or edema  Musculoskeletal: normal range of motion, no joint swelling, deformity or tenderness  Neurologic: reflexes normal and symmetric, no cranial nerve deficit, gait, coordination and speech normal    Assessment/Plan:  1. Hospital discharge follow-up  Stable continue meds  Meds reviewed with patient in detail  Write out Plavix and aspirin and always take with food  - [facility] DISCHARGE MEDS RECONCILED W/ CURRENT OUTPATIENT MED LIST    2. Coronary artery disease involving native coronary artery of native heart without angina pectoris  Continue meds  Crestor and aspirin and metoprolol after dinner  Lisinopril and Plavix after breakfast    - [facility] DISCHARGE MEDS RECONCILED W/ CURRENT OUTPATIENT MED LIST    3. Essential hypertension  Elevated systolic blood pressure  inCrease lisinopril and recheck blood pressure 2-3 with    4. Multinodular goiter  Appointment with endocrinology in the future    5.  Sleep difficulties  Urgent sleep study in light of her recent cardiac stent  - Siomara Holland MD, Sleep Medicine, [facility]        Medical Decision Making: moderate complexity

## 2021-11-22 ENCOUNTER — OFFICE VISIT (OUTPATIENT)
Dept: PULMONOLOGY | Age: 61
End: 2021-11-22
Payer: COMMERCIAL

## 2021-11-22 VITALS
SYSTOLIC BLOOD PRESSURE: 140 MMHG | WEIGHT: 199.2 LBS | OXYGEN SATURATION: 97 % | DIASTOLIC BLOOD PRESSURE: 72 MMHG | BODY MASS INDEX: 34.01 KG/M2 | HEIGHT: 64 IN | HEART RATE: 60 BPM

## 2021-11-22 DIAGNOSIS — G47.10 HYPERSOMNIA: Primary | ICD-10-CM

## 2021-11-22 DIAGNOSIS — I25.10 CORONARY ARTERY DISEASE INVOLVING NATIVE CORONARY ARTERY OF NATIVE HEART WITHOUT ANGINA PECTORIS: Chronic | ICD-10-CM

## 2021-11-22 DIAGNOSIS — E66.9 NON MORBID OBESITY, UNSPECIFIED OBESITY TYPE: Chronic | ICD-10-CM

## 2021-11-22 DIAGNOSIS — I10 ESSENTIAL HYPERTENSION: Chronic | ICD-10-CM

## 2021-11-22 PROCEDURE — 99204 OFFICE O/P NEW MOD 45 MIN: CPT | Performed by: INTERNAL MEDICINE

## 2021-11-22 ASSESSMENT — ENCOUNTER SYMPTOMS
ABDOMINAL PAIN: 0
CHOKING: 0
VOMITING: 0
SHORTNESS OF BREATH: 0
RHINORRHEA: 0
ABDOMINAL DISTENTION: 0
CHEST TIGHTNESS: 0
APNEA: 0
EYE PAIN: 0
ALLERGIC/IMMUNOLOGIC NEGATIVE: 1
PHOTOPHOBIA: 0
NAUSEA: 0

## 2021-11-22 ASSESSMENT — SLEEP AND FATIGUE QUESTIONNAIRES
HOW LIKELY ARE YOU TO NOD OFF OR FALL ASLEEP WHILE SITTING AND READING: 3
HOW LIKELY ARE YOU TO NOD OFF OR FALL ASLEEP WHEN YOU ARE A PASSENGER IN A CAR FOR AN HOUR WITHOUT A BREAK: 0
ESS TOTAL SCORE: 8
HOW LIKELY ARE YOU TO NOD OFF OR FALL ASLEEP WHILE SITTING AND TALKING TO SOMEONE: 0
HOW LIKELY ARE YOU TO NOD OFF OR FALL ASLEEP WHILE SITTING INACTIVE IN A PUBLIC PLACE: 0
HOW LIKELY ARE YOU TO NOD OFF OR FALL ASLEEP WHILE WATCHING TV: 2
HOW LIKELY ARE YOU TO NOD OFF OR FALL ASLEEP WHILE LYING DOWN TO REST IN THE AFTERNOON WHEN CIRCUMSTANCES PERMIT: 3
NECK CIRCUMFERENCE (INCHES): 15
HOW LIKELY ARE YOU TO NOD OFF OR FALL ASLEEP WHILE SITTING QUIETLY AFTER LUNCH WITHOUT ALCOHOL: 0
HOW LIKELY ARE YOU TO NOD OFF OR FALL ASLEEP IN A CAR, WHILE STOPPED FOR A FEW MINUTES IN TRAFFIC: 0

## 2021-11-22 NOTE — PROGRESS NOTES
Roberto Duran MD, Jailyn De Souza, [facility]  Tiffanie Kehrt CNP Luann Maya CNP [employer] [address]  [address] [facility], [address] [facility] [phone]   [facility]   [address]. [address] [facility]. [address] [phone]     [address] [place], [place] [id number] [address] [facility] [address]  [address] [address]  Dept: [phone]  Loc: [phone]    Assessment:      Visit Diagnoses and Associated Orders     Hypersomnia   (New Problem)  -  Primary    needs work-up    Home Sleep Study (HST) [30115 Custom]   - Future Order         Coronary artery disease involving native coronary artery of native heart without angina pectoris   (Stable)           Essential hypertension   (Stable)           Non morbid obesity, unspecified obesity type   (Not Stable)                  Plan:      One or more undiagnosed new problem with uncertain prognosis till final diagnosis is made. Differential diagnosis includes but not limited to: ARLENE, PLMD's, narcolepsy, parasomnias. Reviewed ARLENE (highest likelihood Dx): pathophysiology, diagnosis, complications and treatment. Instructed her not to drive if drowsy. Continue medications per her PCP and other physicians. Limit caffeine use after 3pm. Standard of care is to do in-lab PSG but insurance is mandating an inferior HST. 1 wk follow up after study to review her results. The chronic medical conditions listed are directly related to the primary diagnosis listed above. The management of the primary diagnosis affects the secondary diagnosis and vice versa. Discussed that subclinical anxiety may be playing a role here as well given the stress she is under with her medical situation along with her special needs daughter and needs intensive care during the daytime. We will wait for work-up for sleep disorder breathing to be done first but may need to consider treatment for anxiety as well.     This information was analyzed to assess complexity and medical decision making in regards to further testing and management. Continue meds for: CAD and HTN. Pt would medically benefit from wt loss for ARLENE (diet, exercise, surgical). Orders Placed This Encounter   Procedures    Home Sleep Study (HST)          Subjective:     Patient ID: Toula Frankel is a 57 y.o. female. Chief Complaint   Patient presents with    Sleep Apnea       HPI:      Toula Frankel is a 57 y.o. female referred by Kimmy Robles MD for a sleep evaluation. She complains of: excessive daytime sleepiness , non-restorative sleep, napping and tossing and turning at night. She denies: cataplexy and hypnagogic hallucinations. Symptoms began several years ago, gradually worsening since that time. Struggles to fall and maintain sleep. Previous evaluation and treatment has included- none    Chronic stable medical conditions: HTN and CAD  Chronic unstable medical conditions: obesity    DOT/CDL - No  FAA/'s license -No    Previous Report(s) Reviewed: historical medical records, office notes, andreferral letter(s). Pertinent data has been documented.     [place] - Total score: 8    Caffeine Intake - Coffee: 1 cup(s) per day    Social History     Socioeconomic History    Marital status:      Spouse name: Not on file    Number of children: Not on file    Years of education: Not on file    Highest education level: Not on file   Occupational History    Not on file   Tobacco Use    Smoking status: Former Smoker     Packs/day: 2.00     Years: 27.00     Pack years: 54.00     Types: Cigarettes     Start date: 1981     Quit date: 2007     Years since quittin.9    Smokeless tobacco: Never Used    Tobacco comment: spoke with the patient and updated smoking history   Vaping Use    Vaping Use: Never used   Substance and Sexual Activity    Alcohol use: No     Alcohol/week: 0.0 standard drinks    Drug use: Not on file    Sexual activity: Yes     Partners: Male   Other Topics Concern    Not Reaction Noted    Penicillins Anaphylaxis 09/27/2011    Levaquin [levofloxacin in d5w] Other (See Comments) 09/17/2019    Morphine Rash 09/27/2011       Patient Active Problem List   Diagnosis    Hyperlipidemia    Obesity    Essential hypertension    Breast lump or mass    Calculus of gallbladder without cholecystitis    Acute cholecystitis due to biliary calculus    Family history of lung cancer    Family history of essential hypertension    Multinodular goiter    Abnormal stress test    Chest pain    Coronary artery disease involving native coronary artery of native heart without angina pectoris       Past Medical History:   Diagnosis Date    Breast lump or mass     Gallstones     Hypertension        Past Surgical History:   Procedure Laterality Date    BREAST SURGERY  3368,0533,2003    CHOLECYSTECTOMY, LAPAROSCOPIC  02/03/2017    COLONOSCOPY  8.1.2010    CYSTOSCOPY  7.19.2007    HYSTERECTOMY  2007       Family History   Problem Relation Age of Onset    Cancer Mother 52        UTERINE AND LUNG, SMOKER    Cancer Father 64        THROAT CANCER    Heart Disease Father     Cancer Brother 48        THROAT    Heart Disease Other        Review of Systems   Constitutional: Positive for fatigue. Negative for activity change and appetite change. HENT: Positive for congestion. Negative for nosebleeds, postnasal drip, rhinorrhea and sneezing. Eyes: Negative for photophobia, pain and visual disturbance. Respiratory: Negative for apnea, choking, chest tightness and shortness of breath. Cardiovascular: Negative. Gastrointestinal: Negative for abdominal distention, abdominal pain, nausea and vomiting. Endocrine: Negative for cold intolerance and heat intolerance. Genitourinary: Negative for difficulty urinating, dysuria, frequency and urgency. Musculoskeletal: Negative. Negative for neck pain and neck stiffness. Skin: Negative. Allergic/Immunologic: Negative.     Neurological: Negative for tremors, seizures, syncope and weakness. Hematological: Negative for adenopathy. Does not bruise/bleed easily. Psychiatric/Behavioral: Positive for sleep disturbance. Negative for agitation, behavioral problems and confusion. Objective:     Vitals:  Weight BMI   Wt Readings from Last 3 Encounters:   11/22/21 199 lb 3.2 oz (90.4 kg)   11/19/21 194 lb 12.8 oz (88.4 kg)   11/17/21 197 lb (89.4 kg)    Body mass index is 33.93 kg/m². BP HR SaO2   BP Readings from Last 3 Encounters:   11/22/21 (!) 140/72   11/19/21 (!) 150/70   11/17/21 (!) 123/55    Pulse Readings from Last 3 Encounters:   11/22/21 60   11/19/21 64   11/17/21 69    SpO2 Readings from Last 3 Encounters:   11/22/21 97%   11/19/21 98%   11/17/21 94%        Physical Exam  Vitals reviewed. Constitutional:       General: She is not in acute distress. Appearance: Normal appearance. She is well-developed. She is not toxic-appearing or diaphoretic. HENT:      Head: Normocephalic and atraumatic. Not macrocephalic and not microcephalic. Right Ear: External ear normal.      Left Ear: External ear normal.      Nose: Septal deviation and mucosal edema present. No nasal deformity. Mouth/Throat:      Lips: Pink. Mouth: Mucous membranes are moist.      Tongue: No lesions. Palate: No mass. Pharynx: Uvula midline. No oropharyngeal exudate or uvula swelling. Tonsils: No tonsillar exudate or tonsillar abscesses. Comments: Tonsils: normal size  Eyes:      General: Lids are normal.      Extraocular Movements: Extraocular movements intact. Conjunctiva/sclera: Conjunctivae normal.      Pupils: Pupils are equal, round, and reactive to light. Neck:      Vascular: No JVD. Trachea: Trachea normal.      Comments: Neck Circ: 15 inches    Cardiovascular:      Rate and Rhythm: Normal rate and regular rhythm. Heart sounds: Normal heart sounds.    Pulmonary:      Effort: Pulmonary effort is normal. Breath sounds: Normal breath sounds. Abdominal:      General: Bowel sounds are normal.   Musculoskeletal:      Cervical back: Normal range of motion. Comments: No evidence of cyanosis or clubbing of nails   Skin:     General: Skin is warm. Nails: There is no clubbing. Neurological:      General: No focal deficit present. Mental Status: She is alert. Psychiatric:         Attention and Perception: Attention normal.         Mood and Affect: Mood and affect normal.         Speech: Speech normal.         Behavior: Behavior normal. Behavior is cooperative. Thought Content:  Thought content normal.         Electronically signed by Ramakrishna Chase MD on11/22/2021 at 2:09 PM

## 2021-11-22 NOTE — LETTER
[facility] Sleep Medicine  [address] [facility]  [address] [address]  Phone: [phone]  Fax: [phone]    Vlad Aranda MD    November 22, 2021     Ulyess Curling, MD  [address] [facility] [address] [place] [address] [place] [address]    Patient: Annie Williamson   MR Number: [id number]   YOB: 1960   Date of Visit: 11/22/2021       Dear Ulyess Curling:    Thank you for referring Maria Fernanda Garnett to me for evaluation/treatment. Below are the relevant portions of my assessment and plan of care. Visit Diagnoses and Associated Orders     Hypersomnia   (New Problem)  -  Primary    needs work-up    Home Sleep Study (HST) [63666 Custom]   - Future Order         Coronary artery disease involving native coronary artery of native heart without angina pectoris   (Stable)           Essential hypertension   (Stable)           Non morbid obesity, unspecified obesity type   (Not Stable)                 One or more undiagnosed new problem with uncertain prognosis till final diagnosis is made. Differential diagnosis includes but not limited to: ARLEEN, PLMD's, narcolepsy, parasomnias. Reviewed ARLENE (highest likelihood Dx): pathophysiology, diagnosis, complications and treatment. Instructed her not to drive if drowsy. Continue medications per her PCP and other physicians. Limit caffeine use after 3pm. Standard of care is to do in-lab PSG but insurance is mandating an inferior HST. 1 wk follow up after study to review her results. The chronic medical conditions listed are directly related to the primary diagnosis listed above. The management of the primary diagnosis affects the secondary diagnosis and vice versa. Discussed that subclinical anxiety may be playing a role here as well given the stress she is under with her medical situation along with her special needs daughter and needs intensive care during the daytime.   We will wait for work-up for sleep disorder breathing to be done first but may need to consider treatment for anxiety as well. This information was analyzed to assess complexity and medical decision making in regards to further testing and management. Continue meds for: CAD and HTN. Pt would medically benefit from wt loss for ARLENE (diet, exercise, surgical). Orders Placed This Encounter   Procedures    Home Sleep Study (HST)       If you have questions, please do not hesitate to call me. I look forward to following Jordi Robledo along with you.     Sincerely,      Isabella Gar MD

## 2021-12-10 ENCOUNTER — OFFICE VISIT (OUTPATIENT)
Dept: CARDIOLOGY CLINIC | Age: 61
End: 2021-12-10
Payer: COMMERCIAL

## 2021-12-10 VITALS
BODY MASS INDEX: 33.96 KG/M2 | OXYGEN SATURATION: 97 % | HEIGHT: 64 IN | SYSTOLIC BLOOD PRESSURE: 172 MMHG | WEIGHT: 198.9 LBS | HEART RATE: 57 BPM | RESPIRATION RATE: 17 BRPM | DIASTOLIC BLOOD PRESSURE: 72 MMHG

## 2021-12-10 DIAGNOSIS — E78.2 MIXED HYPERLIPIDEMIA: ICD-10-CM

## 2021-12-10 DIAGNOSIS — I27.20 PULMONARY HYPERTENSION (HCC): ICD-10-CM

## 2021-12-10 DIAGNOSIS — I25.10 CORONARY ARTERY DISEASE INVOLVING NATIVE CORONARY ARTERY OF NATIVE HEART WITHOUT ANGINA PECTORIS: Primary | ICD-10-CM

## 2021-12-10 DIAGNOSIS — I10 ESSENTIAL HYPERTENSION: ICD-10-CM

## 2021-12-10 DIAGNOSIS — E55.9 VITAMIN D DEFICIENCY: ICD-10-CM

## 2021-12-10 LAB
ANION GAP SERPL CALCULATED.3IONS-SCNC: 13 MMOL/L (ref 3–16)
BUN BLDV-MCNC: 17 MG/DL (ref 7–20)
CALCIUM SERPL-MCNC: 9.1 MG/DL (ref 8.3–10.6)
CHLORIDE BLD-SCNC: 104 MMOL/L (ref 99–110)
CHOLESTEROL, TOTAL: 114 MG/DL (ref 0–199)
CO2: 24 MMOL/L (ref 21–32)
CREAT SERPL-MCNC: <0.5 MG/DL (ref 0.6–1.2)
GFR AFRICAN AMERICAN: >60
GFR NON-AFRICAN AMERICAN: >60
GLUCOSE BLD-MCNC: 93 MG/DL (ref 70–99)
HDLC SERPL-MCNC: 51 MG/DL (ref 40–60)
LDL CHOLESTEROL CALCULATED: 49 MG/DL
POTASSIUM SERPL-SCNC: 4 MMOL/L (ref 3.5–5.1)
SODIUM BLD-SCNC: 141 MMOL/L (ref 136–145)
TRIGL SERPL-MCNC: 72 MG/DL (ref 0–150)
VITAMIN D 25-HYDROXY: 28.2 NG/ML
VLDLC SERPL CALC-MCNC: 14 MG/DL

## 2021-12-10 PROCEDURE — 99214 OFFICE O/P EST MOD 30 MIN: CPT | Performed by: NURSE PRACTITIONER

## 2021-12-10 RX ORDER — LISINOPRIL AND HYDROCHLOROTHIAZIDE 12.5; 1 MG/1; MG/1
1 TABLET ORAL DAILY
Qty: 180 TABLET | Refills: 3 | Status: SHIPPED | OUTPATIENT
Start: 2021-12-10 | End: 2022-02-18 | Stop reason: ALTCHOICE

## 2021-12-10 RX ORDER — METOPROLOL SUCCINATE 25 MG/1
12.5 TABLET, EXTENDED RELEASE ORAL DAILY
Qty: 90 TABLET | Refills: 1 | Status: SHIPPED | OUTPATIENT
Start: 2021-12-10 | End: 2022-03-18

## 2021-12-10 NOTE — PROGRESS NOTES
[facility]     Outpatient Follow Up Note    CHIEF COMPLAINT / HPI: Hospital Follow Up secondary to coronary artery disease    Hospital record has been reviewed  Hospital Course progressed as follows per discharge summary:     21  Hospital course:  Patient had scheduled LHC after an abnormal stress test. Successful angioplasty and stenting of RCA. 21  She presented again to the hospital with a persistent headache, difficulty ambulating and chest pain. EKG and troponin normal. Symptoms thought to be related to GERD, anesthesia reaction, or post PCI coronary stretching. Antwan Velez is 57 y.o. female who presents today for a routine follow up after a recent hospitalization related to the above mentioned issues. Subjective:   Since the time of discharge, the patient states her chest pressure has resolved but she continues to have shortness of breath. Her blood pressure medications were changed at discharge; HCTZ was stopped, lisinopril was decreased, and Toprol added. Since then, she has noticed increased swelling in bilateral ankles and elevated blood pressures with readings in 160s. Blood pressure in office today 162/80 and 172/72 on second check. Patient cares for her disabled adult daughter. With regard to medication therapy the patient has been compliant with prescribed regimen. They have tolerated therapy to date.      Past Medical History:   Diagnosis Date    Breast lump or mass     Gallstones     Hypertension      Social History:    Social History     Tobacco Use   Smoking Status Former Smoker    Packs/day: 2.00    Years: 27.00    Pack years: 54.00    Types: Cigarettes    Start date: 1981   [facility] Quit date: 2007    Years since quittin.9   Smokeless Tobacco Never Used   Tobacco Comment    spoke with the patient and updated smoking history     Current Medications:  Current Outpatient Medications   Medication Sig Dispense Refill    lisinopril (PRINIVIL;ZESTRIL) 10 MG tablet Take 1 tablet by mouth daily 90 tablet 1    famotidine (PEPCID) 20 MG tablet Take 1 tablet by mouth 2 times daily 60 tablet 3    Cholecalciferol (VITAMIN D) 50 MCG (2000 UT) CAPS capsule Take 2 capsules by mouth daily      rosuvastatin (CRESTOR) 20 MG tablet Take 1 tablet by mouth daily 90 tablet 1    clopidogrel (PLAVIX) 75 MG tablet Take 1 tablet by mouth daily 90 tablet 3    metoprolol succinate (TOPROL XL) 25 MG extended release tablet Take 1 tablet by mouth daily 90 tablet 1    potassium chloride (KLOR-CON M) 10 MEQ extended release tablet Take 1 tablet by mouth daily 90 tablet 3    aspirin 81 MG EC tablet Take 81 mg by mouth daily       No current facility-administered medications for this visit. REVIEW OF SYSTEMS:   CONSTITUTIONAL: No major weight gain or loss, fatigue, weakness, night sweats or fever. There's been no change in energy level, sleep pattern, or activity level. HEENT: No new vision difficulties or ringing in the ears. RESPIRATORY: No new SOB, PND, orthopnea or cough. CARDIOVASCULAR: See HPI  GI: No nausea, vomiting, diarrhea, constipation, abdominal pain or changes in bowel habits. : No urinary frequency, urgency, incontinence hematuria or dysuria. SKIN: No cyanosis or skin lesions. MUSCULOSKELETAL: No new muscle or joint pain. NEUROLOGICAL: No syncope or TIA-like symptoms. PSYCHIATRIC: No anxiety, pain, insomnia or depression    Objective:   PHYSICAL EXAM:        VITALS:  BP (!) 160/80 (Site: Right Upper Arm, Position: Sitting, Cuff Size: Large Adult)   Pulse 57   Resp 17   Ht 5' 4.25\" (1.632 m)   Wt 198 lb 14.4 oz (90.2 kg)   SpO2 97%   Breastfeeding No   BMI 33.88 kg/m²     CONSTITUTIONAL: Cooperative, no apparent distress, and appears well nourished / developed + overweight   NEUROLOGIC:  Awake and orientated to person, place and time. PSYCH: Calm affect.   SKIN: Warm and dry. + radial site c/d/i   HEENT: Sclera non-icteric, normocephalic, neck supple, no elevation of JVP, normal carotid pulses with no bruits and thyroid normal size. LUNGS:  No increased work of breathing and clear to auscultation, no crackles or wheezing. CARDIOVASCULAR:  Regular rate and rhythm with no murmurs, gallops, rubs, or abnormal heart sounds, normal PMI. The apical impulses not displaced. Heart tones are crisp and normal                                                                                             ABDOMEN:  Normal bowel sounds, non-distended and non-tender to palpation   EXT: No edema, no calf tenderness. Pulses are present bilaterally.     DATA:    Lab Results   Component Value Date    ALT 16 11/17/2021    AST 16 11/17/2021    ALKPHOS 63 11/17/2021    BILITOT 0.3 11/17/2021     Lab Results   Component Value Date    CREATININE <0.5 (L) 11/17/2021    BUN 16 11/17/2021     11/17/2021    K 3.4 (L) 11/17/2021     11/17/2021    CO2 27 11/17/2021     Lab Results   Component Value Date    TSH 0.76 06/25/2020    J9TWTZZ 1.64 07/02/2021    D9RIWWZ 10.6 06/13/2013     Lab Results   Component Value Date    WBC 7.9 11/17/2021    HGB 12.9 11/17/2021    HCT 38.4 11/17/2021    MCV 84.4 11/17/2021     11/17/2021     No components found for: CHLPL  Lab Results   Component Value Date    TRIG 77 07/02/2021    TRIG 118 06/25/2020    TRIG 55 03/04/2019     Lab Results   Component Value Date    HDL 57 07/02/2021    HDL 56 06/25/2020    HDL 82 (H) 03/04/2019     Lab Results   Component Value Date    LDLCALC 132 (H) 07/02/2021    LDLCALC 133 (H) 06/25/2020    LDLCALC 80 03/04/2019     Lab Results   Component Value Date    LABVLDL 15 07/02/2021    LABVLDL 24 06/25/2020    LABVLDL 11 03/04/2019     Radiology Review:  Pertinent images / reports were reviewed as a part of this visit and reveals the following:    Last Echo 9/30/21:  Summary   -Normal left ventricle size, mild concentric wall thickness and normal   systolic function with an estimated ejection fraction of 55-60%. -No regional wall motion abnormalities are seen.   -Normal diastolic function. E/e\"=9.3.   -Aortic valve appears sclerotic but opens adequately. -Trivial pulmonic regurgitation present.   -Normal RV/RA size.   -Pulmonic valve size 2.01 cm (limited visualization). -Unable to estimate pulmonary artery pressure secondary to absent TR jet   envelope. Last Stress Test 11/1/21:  Summary    The overall quality of the study is good. There is subdiaphragmatic    attenuation.        The left ventricular cavity size is normal at 92 ml. The right ventricle is    normal in size.        SPECT images demonstrate a small area of mildly decreased perfusion at the    apex. The defect is present at rest and stress, consistent with prior    infarct.        Sum stress score of 1. No visual TID. Calculated TID is 0.87        Calculated LVEF is normal at 68%.      Myocardial perfusion is abnormal. Moderate risk scan. Symptoms with stress.         Last Angiogram 11/16/21:  Cardiac Cath PCI FFR:  Anatomy:   LM-nml   LAD-nml  Cx-nml  OM- prox 50%  RCA-mid 80% prox 20%  RPDA- nml  LVEF- 60  LVG- nml  LVEDP- 9     LCX FFR 0.96  RCA FFR 0.84  RCA DFR 0.86     Intervention  ~Successful PCI to RCA with 4.0x18 AARON. PD with 4.0x12 NC to 24atm Excellent Result.      Contrast: 69  Flouro Time: 7.8  Access: R radial a     Impression  ~Coronary Angiography w/ severe single vessel CAD  ~LVG with LVEF of 60 and no regional wall motion abnormalities  ~Successful complex angioplasty and stenting of RCA     Recommendation  ~Aggressive medical treatment and risk factor modification  ~1. Post cath IVF. Bedrest.   2. Recommend beta blocker, high potency statin, aspirin and plavix for 6-12 months. No ace/arb required given normal LVEF.   3. Referral to outpatient cardiac rehab phase II will be deferred until patient follow-up in office and then determine patient safety and appropriateness to proceed  4. Patient has been advised on the importance of regular exercise of at least 20-30 minutes daily. 5. Patient counseled about and offered assistance for smoking cessation   6. Follow up in 2 weeks with cardiology    CT Calcium Score 20:  FINDINGS:   LEFT MAIN: Zero (0).     RIGHT CORONARY ARTERY: 47       LEFT ANTERIOR DESCENDIN       CIRCUMFLEX: 4       TOTAL AGATSTON CALCIUM SCORE: 70       EXTRACARDIAC STRUCTURES: No evidence of mediastinal or hilar lymphadenopathy. No pericardial effusion.  No cardiomegaly.  There is bibasilar scarring.  No   noncalcified nodules are noted in the lungs.  Limited images of the upper   abdomen are grossly unremarkable.  Visualized osseous structures demonstrate   age related degenerative changes without acute abnormality. CT Chest 8/15/21:  1. Stable CT scan of the chest, without evidence of acute cardiopulmonary   disease   2. Enlarged main pulmonary artery, measuring 40 mm, suggesting pulmonary   arterial hypertension   3. Diffusely enlarged thyroid gland.  Correlation with the scheduled thyroid   ultrasound recommended. 4. Coronary arterial calcifications      Last ECG 21:   Normal sinus rhythm     Assessment:      Diagnosis Orders   1. Coronary artery disease involving native coronary artery of native heart without angina pectoris   ~ stable, chest pressure improved   ~ [facility] 21 s/p PCI of RCA. Residual nonobstructive disease in OM (50%)    ~ GXT 21 small area of mildly decreased perfusion at the apex. Defect is present at rest and stress. Myocardial perfusion is abnormal.   ~ Coronary artery calcifications seen on CT chest 21  ~ Calcium score 70 (20)  ~ asa / plavix / bb / statin     2.  Essential hypertension   ~ lisinopril-HCTZ was stopped after cath and pt started on plain lisinopril.   ~ home BP has been trending higher, + swelling in BLE  ~ BP in office today 160/80, 172/72 on recheck  ~ Toprol 25 / lisinopril 10

## 2021-12-11 LAB
ESTIMATED AVERAGE GLUCOSE: 122.6 MG/DL
HBA1C MFR BLD: 5.9 %

## 2021-12-12 NOTE — PROGRESS NOTES
Cuauhtemoc Marques is a 57 y.o. female. HPI:  Here for BP check   Check blood pressure 155/64  Increase lisinopril and monitor  Patient due for sleep study end of the month, I do think this will help her blood pressure as well    Labs reviewed with patient in detail, lipids much better  Answered all her questions    Flu vaccine today    Walking and trying to eat healthier    Meds, vitamins and allergies reviewed with pt in detail    Wt Readings from Last 3 Encounters:   12/13/21 196 lb (88.9 kg)   12/10/21 198 lb 14.4 oz (90.2 kg)   11/22/21 199 lb 3.2 oz (90.4 kg)       REVIEW OF SYSTEMS:   CONSTITUTIONAL: See history of present illness,   Weight noted   HEENT: No new vision difficulties or ringing in the ears. RESPIRATORY: No new SOB, PND, orthopnea or cough. CARDIOVASCULAR: no CP, palpitations or SOB with exertion  GI: No nausea, vomiting, diarrhea, constipation, abdominal pain or changes in bowel habits. : No urinary frequency, urgency, incontinence hematuria or dysuria. SKIN: No cyanosis or skin lesions. MUSCULOSKELETAL: No new muscle or joint pain. NEUROLOGICAL: No syncope or TIA-like symptoms. PSYCHIATRIC: No anxiety, insomnia or depression     Allergies   Allergen Reactions    Penicillins Anaphylaxis    Levaquin [Levofloxacin In D5w] Other (See Comments)     Joint pain    Morphine Rash       Prior to Visit Medications    Medication Sig Taking?  Authorizing Provider   lisinopril (PRINIVIL;ZESTRIL) 10 MG tablet Take 10 mg by mouth 2 times daily Yes Historical Provider, MD   metoprolol succinate (TOPROL XL) 25 MG extended release tablet Take 0.5 tablets by mouth daily Yes DIMAS Rob CNP   lisinopril-hydroCHLOROthiazide (PRINZIDE;ZESTORETIC) 10-12.5 MG per tablet Take 1 tablet by mouth daily Yes DIMAS Rob CNP   famotidine (PEPCID) 20 MG tablet Take 1 tablet by mouth 2 times daily Yes Mary Lou Pritchett MD   Cholecalciferol (VITAMIN D) 50 MCG (2000 UT) CAPS capsule Take 2 capsules by mouth daily Yes Historical Provider, MD   rosuvastatin (CRESTOR) 20 MG tablet Take 1 tablet by mouth daily Yes Cresencio Conway MD   clopidogrel (PLAVIX) 75 MG tablet Take 1 tablet by mouth daily Yes Cresencio Conway MD   potassium chloride (KLOR-CON M) 10 MEQ extended release tablet Take 1 tablet by mouth daily Yes Ulyess Curling, MD   aspirin 81 MG EC tablet Take 81 mg by mouth daily Yes Historical Provider, MD       Past Medical History:   Diagnosis Date    Breast lump or mass     Gallstones     Hypertension        Social History     Tobacco Use    Smoking status: Former Smoker     Packs/day: 2.00     Years: 27.00     Pack years: 54.00     Types: Cigarettes     Start date: 1981     Quit date: 2007     Years since quittin.9    Smokeless tobacco: Never Used    Tobacco comment: spoke with the patient and updated smoking history   Substance Use Topics    Alcohol use: No     Alcohol/week: 0.0 standard drinks       Family History   Problem Relation Age of Onset    Cancer Mother 52        UTERINE AND LUNG, SMOKER    Cancer Father 64        THROAT CANCER    Heart Disease Father     Cancer Brother 48        THROAT    Heart Disease Other        OBJECTIVE:  BP (!) 155/64   Pulse 57   Temp 97 °F (36.1 °C) (Temporal)   Resp 18   Ht 5' 4\" (1.626 m)   Wt 196 lb (88.9 kg)   SpO2 97%   BMI 33.64 kg/m²   GEN:  in NAD  HEENT:  NCAT, TMs:normal and throat: Not examined  NECK:  Supple without adenopathy. CV:  Regular rate and rhythm, S1 and S2 normal, no murmurs, clicks  PULM:  Chest is clear, no wheezing ,  symmetric air entry throughout both lung fields.   ABD: Soft, NT, no masses appreciated, BMI noted  EXT: No rash or edema  NEURO: Alert oriented ×3, nonfocal, no assistive device    Lab Results   Component Value Date    WBC 7.9 2021    HGB 12.9 2021    HCT 38.4 2021    MCV 84.4 2021     2021       Lab Results   Component Value Date    CHOL 114 12/10/2021    CHOL 204 (H) 07/02/2021    CHOL 213 (H) 06/25/2020     Lab Results   Component Value Date    TRIG 72 12/10/2021    TRIG 77 07/02/2021    TRIG 118 06/25/2020     Lab Results   Component Value Date    HDL 51 12/10/2021    HDL 57 07/02/2021    HDL 56 06/25/2020     Lab Results   Component Value Date    LDLCALC 49 12/10/2021    LDLCALC 132 (H) 07/02/2021    LDLCALC 133 (H) 06/25/2020     Lab Results   Component Value Date    LABVLDL 14 12/10/2021    LABVLDL 15 07/02/2021    LABVLDL 24 06/25/2020     No results found for: [facility]    Lab Results   Component Value Date    CREATININE <0.5 (L) 12/10/2021       Lab Results   Component Value Date    LABA1C 5.9 12/10/2021     Lab Results   Component Value Date    .6 12/10/2021       ASSESSMENT/PLAN:  1. Essential hypertension  Add on extra 10 mg of lisinopril morning and evening to what she is already taking  Monitor blood pressure at home and report through [employer] to Dr. Maty uSn in 2 to 4 weeks    Sleep study at the end of the month    2. Mixed hyperlipidemia  Stable continue statin    3. Coronary artery disease involving native coronary artery of native heart without angina pectoris  Stable on Plavix and aspirin    4. Need for influenza vaccination  Vaccinate today  - INFLUENZA, MDCK QUADV, 2 YRS AND OLDER, IM, PF, PREFILL SYR OR SDV, 0.5ML (FLUCELVAX QUADV, PF)    5. Vitamin D deficiency  On supplement    6.  Encounter to discuss test results  Labs Reviewed with patient in detail      30 Total Minutes spent pre charting (reviewing problem list, meds, any test results, consultant and hospital notes health maintenance reviewed) and  obtaining present visit history, performing appropriate medical exam/evaluation, counseling and educating the patient (and family), ordering medications ,tests, and procedures as needed, refilling medication(s), placing referral(s) when needed in addition to coordinating care for this patient and documenting in electronic health record

## 2021-12-13 ENCOUNTER — OFFICE VISIT (OUTPATIENT)
Dept: FAMILY MEDICINE CLINIC | Age: 61
End: 2021-12-13
Payer: COMMERCIAL

## 2021-12-13 VITALS
WEIGHT: 196 LBS | RESPIRATION RATE: 18 BRPM | HEART RATE: 57 BPM | DIASTOLIC BLOOD PRESSURE: 64 MMHG | OXYGEN SATURATION: 97 % | BODY MASS INDEX: 33.46 KG/M2 | SYSTOLIC BLOOD PRESSURE: 155 MMHG | HEIGHT: 64 IN | TEMPERATURE: 97 F

## 2021-12-13 DIAGNOSIS — Z71.2 ENCOUNTER TO DISCUSS TEST RESULTS: ICD-10-CM

## 2021-12-13 DIAGNOSIS — Z23 NEED FOR INFLUENZA VACCINATION: ICD-10-CM

## 2021-12-13 DIAGNOSIS — E55.9 VITAMIN D DEFICIENCY: ICD-10-CM

## 2021-12-13 DIAGNOSIS — I25.10 CORONARY ARTERY DISEASE INVOLVING NATIVE CORONARY ARTERY OF NATIVE HEART WITHOUT ANGINA PECTORIS: Chronic | ICD-10-CM

## 2021-12-13 DIAGNOSIS — I10 ESSENTIAL HYPERTENSION: Primary | Chronic | ICD-10-CM

## 2021-12-13 DIAGNOSIS — E78.2 MIXED HYPERLIPIDEMIA: Chronic | ICD-10-CM

## 2021-12-13 PROCEDURE — 90471 IMMUNIZATION ADMIN: CPT | Performed by: FAMILY MEDICINE

## 2021-12-13 PROCEDURE — 90674 CCIIV4 VAC NO PRSV 0.5 ML IM: CPT | Performed by: FAMILY MEDICINE

## 2021-12-13 PROCEDURE — 99214 OFFICE O/P EST MOD 30 MIN: CPT | Performed by: FAMILY MEDICINE

## 2021-12-13 RX ORDER — LISINOPRIL 10 MG/1
10 TABLET ORAL 2 TIMES DAILY
COMMUNITY
End: 2022-02-18

## 2021-12-28 ENCOUNTER — HOSPITAL ENCOUNTER (OUTPATIENT)
Dept: SLEEP CENTER | Age: 61
Discharge: HOME | End: 2021-12-28
Payer: COMMERCIAL

## 2021-12-28 DIAGNOSIS — G47.10 HYPERSOMNIA: ICD-10-CM

## 2021-12-28 PROCEDURE — 95806 SLEEP STUDY UNATT&RESP EFFT: CPT

## 2022-01-02 PROCEDURE — 95806 SLEEP STUDY UNATT&RESP EFFT: CPT | Performed by: INTERNAL MEDICINE

## 2022-01-07 ENCOUNTER — TELEPHONE (OUTPATIENT)
Dept: PULMONOLOGY | Age: 62
End: 2022-01-07

## 2022-01-07 NOTE — PROGRESS NOTES
João Durán         : 1960 [address] [facility]    Diagnosis: [x] ARLENE (G47.33) [] CSA (G47.31) [] Apnea (G47.30)   Length of Need: [x] 12 Months [] 99 Months [] Other:    Machine (BOBBY!): [] Respironics Dream Station   2   Auto [x] ResMed AirSense     Auto S11 [] Other:     [x]  CPAP () [] Bilevel ()   Mode: [x] Auto [] Spontaneous    Mode: [] Auto [] Spontaneous      P min 6 cmH2O  P max 16 cmH2O      Comfort Settings:   - Ramp Pressure: 4 cmH2O                                        - Ramp time: 15 min                                     -  Flex/EPR - 3 full time                                    - For ResMed Bilevel (TiMax-4 sec   TiMin- 0.2 sec)     Humidifier: [x] Heated ()        [x] Water chamber replacement ()/ 1 per 6 months        Mask:   [x] Nasal () /1 per 3 months [] Full Face () /1 per 3 months   [x] Patient choice -Size and fit mask [] Patient Choice - Size and fit mask   [] Dispense:  [] Dispense:    [x] Headgear () / 1 per 3 months [] Headgear () / 1 per 3 months   [x] Replacement Nasal Cushion ()/2 per month [] Interface Replacement ()/1 per month   [] Replacement Nasal Pillows ()/2 per month         Tubing: [x] Heated ()/1 per 3 months    [] Standard ()/1 per 3 months [] Other:           Filters: [x] Non-disposable ()/1 per 6 months     [x] Ultra-Fine, Disposable ()/2 per month        Miscellaneous: [] Chin Strap ()/ 1 per 6 months [] O2 bleed-in:       LPM   [] Oximetry on CPAP/Bilevel []  Other:    [x] Modem: ()         Start Order Date: 22    MEDICAL JUSTIFICATION:  I, the undersigned, certify that the above prescribed supplies are medically necessary for this patients wellbeing. In my opinion, the supplies are both reasonable and necessary in reference to accepted standards of medicalpractice in treatment of this patients condition.     Sim Collazo MD      NPI: [id number]       Order Signed Date: 22    Electronically signed by Narciso Pickens MD on 2022 at 10:57 AM    Adiel Flores  1960  [facility] [place]   [phone] (home)   [phone] (mobile)      Insurance Info (confirm with patient if correct):  Payor/Plan Subscr  Sex Relation Sub.  Ins. ID Effective Group Num

## 2022-01-07 NOTE — TELEPHONE ENCOUNTER
Spoke with pt to review titration study. F/U scheduled. Order to be sent to [facility].

## 2022-01-15 ENCOUNTER — HOSPITAL ENCOUNTER (OUTPATIENT)
Dept: ULTRASOUND IMAGING | Age: 62
Discharge: HOME | End: 2022-01-15
Payer: COMMERCIAL

## 2022-01-15 DIAGNOSIS — E04.1 THYROID NODULE: ICD-10-CM

## 2022-01-15 PROCEDURE — 76536 US EXAM OF HEAD AND NECK: CPT

## 2022-02-07 ENCOUNTER — HOSPITAL ENCOUNTER (OUTPATIENT)
Dept: CARDIAC REHAB | Age: 62
Setting detail: THERAPIES SERIES
Discharge: HOME | End: 2022-02-07
Payer: COMMERCIAL

## 2022-02-07 ENCOUNTER — TELEPHONE (OUTPATIENT)
Dept: CARDIOLOGY CLINIC | Age: 62
End: 2022-02-07

## 2022-02-07 VITALS
RESPIRATION RATE: 16 BRPM | BODY MASS INDEX: 33.7 KG/M2 | WEIGHT: 197.4 LBS | SYSTOLIC BLOOD PRESSURE: 146 MMHG | DIASTOLIC BLOOD PRESSURE: 80 MMHG | HEIGHT: 64 IN | OXYGEN SATURATION: 96 % | HEART RATE: 64 BPM

## 2022-02-07 PROCEDURE — 93798 PHYS/QHP OP CAR RHAB W/ECG: CPT

## 2022-02-07 RX ORDER — NITROGLYCERIN 0.4 MG/1
0.4 TABLET SUBLINGUAL EVERY 5 MIN PRN
Qty: 25 TABLET | Refills: 3 | Status: SHIPPED | OUTPATIENT
Start: 2022-02-07

## 2022-02-07 ASSESSMENT — PATIENT HEALTH QUESTIONNAIRE - PHQ9
SUM OF ALL RESPONSES TO PHQ QUESTIONS 1-9: 4

## 2022-02-07 NOTE — TELEPHONE ENCOUNTER
Main Edwards did an initial intake for this pt. Pt is advising she is sob with incline walking, and going up stairs. Also takes care of her 39year old handicap daughter. Asking if the pt could be given a script for NTG. Was given some in the ER, and it worked well.

## 2022-02-07 NOTE — PROGRESS NOTES
[facility] Cardiac Rehabilitation Initial Evaluation    Marques Bernal       1960     [id number]    Share medical information:  Yes [phone]    Cardiac History    [facility] 21. RESIDUAL NONOBSTRUCTIVE DISEASE IN OM (50%). Physical Assessment     General Appearance   Height:  5' 4.25\" (163.2 cm)  Weight:  197 lb 6.4 oz (89.5 kg)   BMI:  33.7  Skin color: Within Defined Limits      Cardiovascular Assessment  BP Sitting:  (!) 146/80-L   Sittin/80-R    Standin/80-L 146/80-R  Heart rate:   64 Apical,Monitor   Heart sounds:   Regular S1, S2     Respiratory Assessment  Resp rate: 16    BREATH S- L AND R CLEAR BILATERALLY. SpO2:  96 %  Quality/Effort:  REGULAR INSPIRATION AND                         EXPIRATION   Sleep Apnea:  Yes-2022-\"WAITING FOR                                 MACHINE. \"  CPAP  Yes  Oxygen  No     Sleeping Habits:  SLEEPS 5 HRS. EACH NIGHT. UP 1-2X TO BATHROOM. Edema:  No      Orthopedic/Exercise Limitations:  Yes R KNEE- HX R KNEE MESETOMY. EVENTUALLY NEEDS TKR    Pain:   Do you have pain?:  no       Fall Risk Assessment     History of falling with or without injury: Yes (TRIPPED AT HOME-FELL FORWARD-HIT KNEES)  Use of ambulatory aid: No  Difficulty walking/impaired gait: No  Numbness in feet: No  Vision changes: No  Dizziness: No  Shortness of breath: Yes (GOING UP INCLINE, STAIRS)  Current medications include but not limited to: Anticoagulant,ACE, ARB, Beta  Blocker  Other fall risk : No  Outpatient fall risk intervention strategies: Fall risk education provided    Abuse / Neglect  Physical/behavioral signs of abuse/neglect   No    Do you feel safe at home   Yes    Advanced Directives  Patient has Advanced Directives:  Yes  Patient given Advanced Directive pack:  Declined    Vaccinations  Influenza (annual):  Yes-2021  Pneumonia:  Yes-WITHIN LAST 3-5 YRS.   COVID-PFIZER  [facility]  AND 22 1300 MET WITH PATIENT IN ASSESSMENT ROOM FOR INITIAL EVALUATION FOR PHASE 2 CARDIAC REHAB. HER HEART SYMPTOMS WERE DEEP MID STERNAL CHEST PAIN AND SOB. \"STILL HAVING SOB WHEN GOING UP DRIVEWAY, ESPECIALLY WITH TRASH CANS. ALSO HAS SOME WITH STAIRS FROM [facility]. [address] OFFICE-SPOKE WITH MANN. TOLD ABOUT PT'S. 1-2 EPISODES OF SOB WITH GETTING TRASH CANS UP DRIVEWAY AND STAIRS AT HOME. REQUESTED ORDER FOR NITROGLYCERINE. MANN WILL CHECK WITH DR. Renetta Bocanegra AND LET PT. KNOW. PT. DOING 60,000 STEPS AT [address] [place]. 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 HAS AIDS COMING INTO HOUSE TO ASSIST WITH DAUGHTER'S CARE. PT. WANTS TO  BE ABLE TO USE TREADMILL AND RECUMBENT BIKE 3 DAYS/WK. ,WORKING TOWARD 30 MINUTES INITIALLY. WANTS TO EVENUTALLY DO ROWER AND ELLIPTICAL. EXPLAINED RPE HANDOUT. TOLD TO WORK AT A FAIRLY LIGHT TO MEDIUM FEEL, WHERE CAN TALK WITHOUT GASPING FOR AIR. TOLD ELLIPTICAL ELEVATES HEART RATE QUCKER THAN ANOTHER OTHER PIECE OF EQUIPMENT. NEED TO GET MORE CONDITIONED  BEFORE USING THIS MACHINE HERE OR AT HOME.  VERBALIZED UNDERSTANDING. TAUGHT AND DID PURSED LIP BREATHING WITH WALK TEST TO ASSIST. PT. DRINKING 33 OZ. WATER DAILY. WILL ADD 1/2 CUP DAILY TO WORK TOWARD 64 OZ. WATER DAILY. CURRENTLY DRINKING ABOUT 48 OZ. CAFFEINATED COFFEE DAILY. EXPLAINED THIS CAN ADVERSELY ELEVATE HEART RATE,PUTTING MORE WORKLOAD ON HEART/LUNGS. VERBALIZED UNDERSTANDING. PT. WILL TRY TO MIX 1/2 CAFFEINATED AND DECAFFEINATED COFFEE TOGETHER. 'I CAN START TOMORROW. \" PT. ENCOURAGED TO WEIGH DAILY AFTER 1ST AM VOIDING. TO JOSE E IT DOWN ON CALENDAR. TO WATCH FOR 2-3 LB.WEIGHT GAIN FROM PREVIOUS DAY OR 5 LBS. IN 1 WEEK. COULD BE HOLDING FLUID WHICH WOULD PUT MORE WORKLOAD ON HEART/LUNGS. VERBALIZED UNDERSTANDING. \"ANKLES WERE SWOLLEN BEFORE WATER PILL STARTED. \"  REVIEWED INFORMATION IN CARDIAC REHAB WELCOME LETTER. VERBALIZED UNDERSTANDING.  COPY OF WORKSHEET WITH INSURANCE COVERAGE INFORMATION, GIVEN TO PT. Vero Diaz SPOKE WITH PT. EARLIER TODAY ABOUT CURRENT INSURANCE COVERAGE. PT. WILL CHECK WITH DR. SIMPSON'S OFFICE RE: DISSOLVABLE POTASSIUM TABLET,SINCE IT IS HARD FOR HER TO SWALLOW CURRENT TABLET. PT. TO START CARDIAC REHAB ON 2/9/22 10:45 FOR 11:00 AM START TIME. WILL BE COMING MONDAYS AND WEDNESDAYS. DIFFICULTY GETTING HELP WITH DAUGHTER ON FRIDAYS.      Jordyn Fernandez RN  2/7/2022

## 2022-02-07 NOTE — TELEPHONE ENCOUNTER
Called to discuss with Carolyn Pham, got voicemail. Per Herbert vasquez to sent Rx for [facility], I sent it to [facility]. I did let patient know to call tomorrow if she needed it to go to a different pharmacy. Also asked her to call to discuss her symptoms. Attempted to call cardiac rehab, lEizabeth, answering machine was on- no message left.

## 2022-02-08 NOTE — TELEPHONE ENCOUNTER
Patient called back about the message below and stated that she has some tightness in the chest with activity along with sobeoe. Patient stated she was waiting till next month appointment to discuss this, she stated that she feels its nothing pressing right now.

## 2022-02-09 ENCOUNTER — HOSPITAL ENCOUNTER (OUTPATIENT)
Dept: CARDIAC REHAB | Age: 62
Setting detail: THERAPIES SERIES
Discharge: HOME | End: 2022-02-09
Payer: COMMERCIAL

## 2022-02-09 ENCOUNTER — TELEPHONE (OUTPATIENT)
Dept: CARDIOLOGY CLINIC | Age: 62
End: 2022-02-09

## 2022-02-09 DIAGNOSIS — R07.9 CHEST PAIN, UNSPECIFIED TYPE: ICD-10-CM

## 2022-02-09 DIAGNOSIS — I25.10 CORONARY ARTERY DISEASE INVOLVING NATIVE CORONARY ARTERY OF NATIVE HEART WITHOUT ANGINA PECTORIS: Primary | ICD-10-CM

## 2022-02-09 DIAGNOSIS — R07.82 INTERCOSTAL PAIN: ICD-10-CM

## 2022-02-09 PROCEDURE — 93798 PHYS/QHP OP CAR RHAB W/ECG: CPT

## 2022-02-09 NOTE — TELEPHONE ENCOUNTER
Discussed with [facility] in agreement  She will hold off on Cardiac rehab until stress testing complete

## 2022-02-09 NOTE — TELEPHONE ENCOUNTER
Received notification from cardiac rehab- Edmund Chanel had reported mild chest tightness while on the treadmill during her session today. She is s/p AARON-RCA 11/2021 with residual OM1 50% prox stenosis. She did go back tot he ER for chest pain 12 hours after stenting thought to be related to coronary stretching, GERD, or post anesthesia reaction. She denies chest pain at December follow up with AMBROSIO. Spoke with her and she states she has not felt the need to use Nitro since calling and asking for it a few days ago for occasional. mild chest tightness and [place]. States her chest tightness on the treadmill went away \"when she slowed down and resolved in seconds\" but reports her breathing was without concern. It did remind her of symptoms experienced on her stress test prior to PCI, though much less intense and without the breathing component. She wonders if anxiety plays a part as she cares for her adult disabled daughter full time. Will have [place] review EKGs from cardiac rehab and discuss her symptoms. She has ov with him next month.

## 2022-02-11 ENCOUNTER — APPOINTMENT (OUTPATIENT)
Dept: CARDIAC REHAB | Age: 62
End: 2022-02-11
Payer: COMMERCIAL

## 2022-02-14 ENCOUNTER — APPOINTMENT (OUTPATIENT)
Dept: CARDIAC REHAB | Age: 62
End: 2022-02-14
Payer: COMMERCIAL

## 2022-02-16 ENCOUNTER — APPOINTMENT (OUTPATIENT)
Dept: CARDIAC REHAB | Age: 62
End: 2022-02-16
Payer: COMMERCIAL

## 2022-02-16 ENCOUNTER — HOSPITAL ENCOUNTER (OUTPATIENT)
Dept: NON INVASIVE DIAGNOSTICS | Age: 62
Discharge: HOME | End: 2022-02-16
Payer: COMMERCIAL

## 2022-02-16 DIAGNOSIS — I25.10 CORONARY ARTERY DISEASE INVOLVING NATIVE CORONARY ARTERY OF NATIVE HEART WITHOUT ANGINA PECTORIS: ICD-10-CM

## 2022-02-16 DIAGNOSIS — R07.9 CHEST PAIN, UNSPECIFIED TYPE: ICD-10-CM

## 2022-02-16 LAB
LV EF: 67 %
LVEF MODALITY: NORMAL

## 2022-02-16 PROCEDURE — A9502 TC99M TETROFOSMIN: HCPCS | Performed by: INTERNAL MEDICINE

## 2022-02-16 PROCEDURE — 78452 HT MUSCLE IMAGE SPECT MULT: CPT | Performed by: INTERNAL MEDICINE

## 2022-02-16 PROCEDURE — 93017 CV STRESS TEST TRACING ONLY: CPT | Performed by: INTERNAL MEDICINE

## 2022-02-16 PROCEDURE — 3430000000 HC RX DIAGNOSTIC RADIOPHARMACEUTICAL: Performed by: INTERNAL MEDICINE

## 2022-02-16 RX ADMIN — TETROFOSMIN 30 MILLICURIE: 1.38 INJECTION, POWDER, LYOPHILIZED, FOR SOLUTION INTRAVENOUS at 14:14

## 2022-02-16 RX ADMIN — TETROFOSMIN 10 MILLICURIE: 1.38 INJECTION, POWDER, LYOPHILIZED, FOR SOLUTION INTRAVENOUS at 12:58

## 2022-02-17 ENCOUNTER — HOSPITAL ENCOUNTER (EMERGENCY)
Age: 62
Discharge: HOME | End: 2022-02-17
Attending: EMERGENCY MEDICINE
Payer: COMMERCIAL

## 2022-02-17 VITALS
DIASTOLIC BLOOD PRESSURE: 82 MMHG | RESPIRATION RATE: 18 BRPM | HEART RATE: 94 BPM | OXYGEN SATURATION: 97 % | SYSTOLIC BLOOD PRESSURE: 189 MMHG | TEMPERATURE: 98.7 F

## 2022-02-17 DIAGNOSIS — I10 HYPERTENSION, UNSPECIFIED TYPE: Primary | ICD-10-CM

## 2022-02-17 LAB
A/G RATIO: 1.4 (ref 1.1–2.2)
ALBUMIN SERPL-MCNC: 4.5 G/DL (ref 3.4–5)
ALP BLD-CCNC: 61 U/L (ref 40–129)
ALT SERPL-CCNC: 25 U/L (ref 10–40)
ANION GAP SERPL CALCULATED.3IONS-SCNC: 11 MMOL/L (ref 3–16)
AST SERPL-CCNC: 21 U/L (ref 15–37)
BASOPHILS ABSOLUTE: 0.1 K/UL (ref 0–0.2)
BASOPHILS RELATIVE PERCENT: 0.9 %
BILIRUB SERPL-MCNC: 0.5 MG/DL (ref 0–1)
BUN BLDV-MCNC: 17 MG/DL (ref 7–20)
CALCIUM SERPL-MCNC: 9.9 MG/DL (ref 8.3–10.6)
CHLORIDE BLD-SCNC: 101 MMOL/L (ref 99–110)
CO2: 26 MMOL/L (ref 21–32)
CREAT SERPL-MCNC: <0.5 MG/DL (ref 0.6–1.2)
EOSINOPHILS ABSOLUTE: 0.2 K/UL (ref 0–0.6)
EOSINOPHILS RELATIVE PERCENT: 2.6 %
GFR AFRICAN AMERICAN: >60
GFR NON-AFRICAN AMERICAN: >60
GLUCOSE BLD-MCNC: 111 MG/DL (ref 70–99)
HCT VFR BLD CALC: 41.8 % (ref 36–48)
HEMOGLOBIN: 14 G/DL (ref 12–16)
LYMPHOCYTES ABSOLUTE: 1.8 K/UL (ref 1–5.1)
LYMPHOCYTES RELATIVE PERCENT: 21.1 %
MCH RBC QN AUTO: 28.3 PG (ref 26–34)
MCHC RBC AUTO-ENTMCNC: 33.6 G/DL (ref 31–36)
MCV RBC AUTO: 84.3 FL (ref 80–100)
MONOCYTES ABSOLUTE: 0.5 K/UL (ref 0–1.3)
MONOCYTES RELATIVE PERCENT: 5.8 %
NEUTROPHILS ABSOLUTE: 6.1 K/UL (ref 1.7–7.7)
NEUTROPHILS RELATIVE PERCENT: 69.6 %
PDW BLD-RTO: 13.6 % (ref 12.4–15.4)
PLATELET # BLD: 273 K/UL (ref 135–450)
PMV BLD AUTO: 8.4 FL (ref 5–10.5)
POTASSIUM REFLEX MAGNESIUM: 4.7 MMOL/L (ref 3.5–5.1)
PRO-BNP: 32 PG/ML (ref 0–124)
RBC # BLD: 4.95 M/UL (ref 4–5.2)
SODIUM BLD-SCNC: 138 MMOL/L (ref 136–145)
TOTAL PROTEIN: 7.8 G/DL (ref 6.4–8.2)
TROPONIN: <0.01 NG/ML
WBC # BLD: 8.7 K/UL (ref 4–11)

## 2022-02-17 PROCEDURE — 85025 COMPLETE CBC W/AUTO DIFF WBC: CPT

## 2022-02-17 PROCEDURE — 83880 ASSAY OF NATRIURETIC PEPTIDE: CPT

## 2022-02-17 PROCEDURE — 99283 EMERGENCY DEPT VISIT LOW MDM: CPT

## 2022-02-17 PROCEDURE — 84484 ASSAY OF TROPONIN QUANT: CPT

## 2022-02-17 PROCEDURE — 80053 COMPREHEN METABOLIC PANEL: CPT

## 2022-02-17 PROCEDURE — 36415 COLL VENOUS BLD VENIPUNCTURE: CPT

## 2022-02-17 PROCEDURE — 93005 ELECTROCARDIOGRAM TRACING: CPT | Performed by: EMERGENCY MEDICINE

## 2022-02-17 RX ORDER — CLONIDINE HYDROCHLORIDE 0.1 MG/1
0.1 TABLET ORAL DAILY PRN
Qty: 30 TABLET | Refills: 0 | Status: SHIPPED | OUTPATIENT
Start: 2022-02-17 | End: 2022-03-18

## 2022-02-17 NOTE — ED PROVIDER NOTES
[facility]        Pt Name: Jimmy Smith  MRN: [id number]  [place] 1960  Date of evaluation: 2/17/2022  Provider: Leeann Wyatt MD  PCP: Maryana Rodriguez MD    This patient was seen and evaluated by the attending physician Leeann Wyatt MD.      [address] [place] [address]       Chief Complaint   Patient presents with    Hypertension     Reports hypertension for approx one week; increasingly concerned with dizziness & bilateral arm numbness this morning. Was seen yesterday for negative stress test.         HISTORY OF PRESENT ILLNESS   (Location/Symptom, Timing/Onset, Context/Setting, Quality, Duration, Modifying Factors, Severity)  Note limiting factors. Jimmy Smith is a 57 y.o. female a history of hypertension and coronary disease status post PCI by Dr. Willian Blas back November with a stress test just a few days ago was benign on lisinopril hydrochlorothiazide and metoprolol various doses as per EMR here today with chief complaint of some paresthesias now resolved and noting her blood pressure was elevated. At present is asymptomatic. No fevers chills sweats headaches chest pain dyspnea no tingling numbness weeks paresthesia no vertigo. Nursing Notes were all reviewed and agreed with or any disagreements were addressed  in the HPI. REVIEW OF SYSTEMS    (2-9 systems for level 4, 10 or more for level 5)     Review of Systems    Positives and Pertinent negatives as per HPI. Except as noted abovein the ROS, all other systems were reviewed and negative.        PAST MEDICAL HISTORY     Past Medical History:   Diagnosis Date    Arthritis     R KNEE-EVENTUALLY NEEDS RTKR    Breast lump or mass     CAD (coronary artery disease)     Gallstones     Hyperlipidemia     Hypertension     Thyroid disease     MULTINODULAR-6/2020         SURGICAL HISTORY     Past Surgical History:   Procedure Laterality Date    BREAST SURGERY 111  [facility], LAPAROSCOPIC  02/03/2017    COLONOSCOPY  8.1.2010    CYSTOSCOPY  7.19.2007    HYSTERECTOMY  2007    SKIN BIOPSY      UPPER CHEST MOLE BX-2010    TONSILLECTOMY      AGE 22 YRS.           CURRENTMEDICATIONS       Previous Medications    ASPIRIN 81 MG EC TABLET    Take 81 mg by mouth daily    CHOLECALCIFEROL (VITAMIN D) 50 MCG (2000 UT) CAPS CAPSULE    Take 2 capsules by mouth daily    CLOPIDOGREL (PLAVIX) 75 MG TABLET    Take 1 tablet by mouth daily    FAMOTIDINE (PEPCID) 20 MG TABLET    Take 1 tablet by mouth 2 times daily    LISINOPRIL (PRINIVIL;ZESTRIL) 10 MG TABLET    Take 10 mg by mouth 2 times daily    LISINOPRIL-HYDROCHLOROTHIAZIDE (PRINZIDE;ZESTORETIC) 10-12.5 MG PER TABLET    Take 1 tablet by mouth daily    METOPROLOL SUCCINATE (TOPROL XL) 25 MG EXTENDED RELEASE TABLET    Take 0.5 tablets by mouth daily    NITROGLYCERIN (NITROSTAT) 0.4 MG SL TABLET    Place 1 tablet under the tongue every 5 minutes as needed for Chest pain    POTASSIUM CHLORIDE (KLOR-CON M) 10 MEQ EXTENDED RELEASE TABLET    Take 1 tablet by mouth daily    ROSUVASTATIN (CRESTOR) 20 MG TABLET    Take 1 tablet by mouth daily         ALLERGIES     Penicillins, Levaquin [levofloxacin in d5w], and Morphine    FAMILYHISTORY       Family History   Problem Relation Age of Onset    Cancer Mother 52        UTERINE AND LUNG, SMOKER    Cancer Father 64        THROAT CANCER    Heart Disease Father     Cancer Brother 48        THROAT    Heart Disease Other           SOCIAL HISTORY       Social History     Socioeconomic History    Marital status:      Spouse name: Bruna Jack    Number of children: 2    Years of education: 12    Highest education level: None   Occupational History    Occupation:      Employer: [employer]   Tobacco Use    Smoking status: Former Smoker     Packs/day: 2.00     Years: 27.00     Pack years: 54.00     Types: Cigarettes     Start date: 1/4/1981 Quit date: 1/1/2007     Years since quitting: 15.1    Smokeless tobacco: Never Used   Vaping Use    Vaping Use: Never used   Substance and Sexual Activity    Alcohol use: Never     Alcohol/week: 0.0 standard drinks    Drug use: Never    Sexual activity: Yes     Partners: Male   Other Topics Concern    None   Social History Narrative    None     Social Determinants of Health     Financial Resource Strain: Low Risk     Difficulty of Paying Living Expenses: Not hard at all   Food Insecurity: No Food Insecurity    Worried About Running Out of Food in the Last Year: Never true    Radha of Food in the Last Year: Never true   Transportation Needs: No Transportation Needs    Lack of Transportation (Medical): No    Lack of Transportation (Non-Medical):  No   Physical Activity:     Days of Exercise per Week: Not on file    Minutes of Exercise per Session: Not on file   Stress:     Feeling of Stress : Not on file   Social Connections:     Frequency of Communication with Friends and Family: Not on file    Frequency of Social Gatherings with Friends and Family: Not on file    Attends Buddhist Services: Not on file    Active Member of [address] [place] [address] or Organizations: Not on file    Attends Club or Organization Meetings: Not on file    Marital Status: Not on file   Intimate Partner Violence:     Fear of Current or Ex-Partner: Not on file    Emotionally Abused: Not on file    Physically Abused: Not on file    Sexually Abused: Not on file   Housing Stability:     Unable to Pay for Housing in the Last Year: Not on file    Number of [place] in the Last Year: Not on file    Unstable Housing in the Last Year: Not on file       SCREENINGS             PHYSICAL EXAM    (up to 7 for level 4, 8 or more for level 5)     ED Triage Vitals   BP Temp Temp src Pulse Resp SpO2 Height Weight   02/17/22 1751 02/17/22 1756 -- 02/17/22 1751 02/17/22 1751 02/17/22 1751 -- --   (!) 189/82 98.7 °F (37.1 °C)  94 18 97 % Physical Exam     General Appearance:  Alert, cooperative, no distress, appears stated age. Head:  Normocephalic, without obvious abnormality, atraumatic. Eyes:  conjunctiva/corneas clear, EOM's intact. Sclera anicteric. ENT: Mucous membranes moist.   Neck: Supple, symmetrical, trachea midline, no adenopathy. No jugular venous distention. Lungs:   No Respiratory Distress. Chest Wall:  Atraumatic   Heart:  RRR   Abdomen:   Soft, NT, ND   Extremities:  Full range of motion. Pulses: Symmetric x4   Skin:  No rashes or lesions to exposed skin. Neurologic: Alert and oriented X 3. Motor grossly normal.  Speech clear. DIAGNOSTIC RESULTS   LABS:    Labs Reviewed   COMPREHENSIVE METABOLIC PANEL W/ REFLEX TO MG FOR LOW K - Abnormal; Notable for the following components:       Result Value    Glucose 111 (*)     CREATININE <0.5 (*)     All other components within normal limits    Narrative:     Performed at:  [facility] [address]   Phone [phone]   CBC WITH AUTO DIFFERENTIAL    Narrative:     Performed at:  [facility] [address] [employer]   Phone [phone]   TROPONIN    Narrative:     Performed at:  [facility] [address]   Phone [phone]     Narrative:     Performed at:  [facility] [address]   Phone [phone]       All other labs were within normal range or not returned as of this dictation. EKG: All EKG's are interpreted by the Emergency Department Physician in the absence of a cardiologist.  Please see their note for interpretation of EKG. EKG is reviewed myself. Dated today at 56. Rate 77 sinus rhythm normal EKG. There is no change from 17th Nov 2021.     RADIOLOGY:   Non-plain film images such as CT, Ultrasound and MRI are read by the radiologist. Skeet Dredge radiographic images are visualized andpreliminarily interpreted by the  ED Provider with the below findings:        Interpretation perthe Radiologist below, if available at the time of this note:    No orders to display     NM Cardiac Stress Test Nuclear Imaging    Result Date: 2/16/2022  Cardiac Perfusion Imaging  Demographics   Patient Name      [facility] Eron Murray [address]   Date of Study     02/16/2022         Gender              Female   Patient Number    [id number]         Date of Birth       1960   Visit Number      [id number]          Age                 57 year(s)   Accession Number  [id number]         Room Number         op   Corporate ID      [id number]            NM Technician       Elie El, PONCET   Nurse             Robin Calderon, RN Interpreting        Kirit Scott, DO                                       Physician   Ordering          Chano Buck MD,  Physician         Deyanira Sheppard   The procedure was explained in detail to the patient. Risks,  complications and alternative treatments were reviewed. Written consent  was obtained. Procedure Procedure Type:   Nuclear Stress Test:Exercise, NM MYOCARDIAL SPECT REST EXERCISE OR RX   Study location: [facility] - Nuclear Medicine   Indications: Chest pain. Hospital Status: Outpatient. Height: 64 inches Weight: 195 pounds  Risk Factors   The patient risk factors include:prior PCI on 11/01/2016;obesity, physical  activity, former tobacco use, treated and controlled hypercholesterolemia,  treated and controlled hypertension, family history of premature CAD,  dyslipidemia and prior MI . Conclusions   Summary  The overall quality of the study is good. There is subdiaphragmatic  attenuation. The left ventricle is normal in size. The right ventricle is normal in size. SPECT images demonstrate homogeneous tracer distribution throughout the  myocardium.  There is normal isotope uptake at stress and rest. There is no  evidence of myocardial ischemia or scar. Gated spect reveals normal  myocardial thickening and wall motion. Sum stress score of 2. No visual TID. Calculated TID is 1.02. Left ventricular ejection fraction is normal at 67%. Myocardial perfusion is normal. Low risk scan. Hypertensive response to  exercise   Recommendation  Follow up with Dr. Nancy Castillo about blood pressure management  Stress Protocols   Resting ECG  Sinus rhythm. Nonspecific st-t wave changes. Resting HR:68 bpm     Resting BP:149/81 mmHg  Stress Protocol:Exercise - Raúl  Peak HR:139 bpm                  HR/BP product:75006  Peak BP:217/79 mmHg              Max exercise: 7 METS  Predicted HR: 158 bpm  % of predicted HR: 88  Test duration:4 min and 35 sec  Reason for termination:Completed   ECG Findings  SInus tachycardia. Arrhythmias  Rare PAC. Symptoms  Peak O2 sat 94% on room air. There was stress induced shortness of breath. Symptoms resolved with rest.  Denies chest pain or discomfort. Complications  Procedure complication was none. Stress Interpretation  No ischemic EKG changes. Less than 0.5 mm upsloping ST deviation with exercise. Hypertensive response to exercise with BP (217/79). Imaging Protocols   - One Day   Rest                          Stress   Isotope:Myoview/Tetrofosmin   Isotope: Myoview/Tetrofosmin  Isotope dose:9.9 mCi          Isotope dose:31.4 mCi  Administration Route:I.V. Administration Route:I.V.  Date:02/16/2022 12:59         Date:02/16/2022 14:15                                 Technique:      Gated  Imaging Results    Stress ejection    Ejection fraction:67 %    EDV :103 ml    ESV :34 ml    Stroke volume :69 ml    LV mass :138 gr  Medical History   Additional Medical History   Last Stress Test 11/1/21:  Summary  The overall quality of the study is good. There is  subdiaphragmatic  attenuation. The left ventricular cavity size is normal at 92 ml.  The right  ventricle is normal in size. SPECT images demonstrate a small area of mildly decreased  perfusion at the apex. The defect is present at rest and stress,  consistent with prior  infarct. Sum stress score of 1. No visual TID. Calculated TID is 0.87  Calculated LVEF is normal at 68%. Myocardial perfusion is abnormal. Moderate risk scan. Symptoms  with stress. Breast lump or mass  Gallstones  Hypertension   Signatures   ------------------------------------------------------------------  Electronically signed by Princess Vela DO (Interpreting  physician) on 02/16/2022 at 17:19  ------------------------------------------------------------------            PROCEDURES   Unless otherwise noted below, none     Procedures    CRITICAL CARE TIME   N/A    CONSULTS:  None      EMERGENCY DEPARTMENT COURSE and DIFFERENTIAL DIAGNOSIS/MDM:   Vitals:    Vitals:    02/17/22 1751 02/17/22 1756   BP: (!) 189/82    Pulse: 94    Resp: 18    Temp:  98.7 °F (37.1 °C)   SpO2: 97%        Patient was given thefollowing medications:  Medications - No data to display    70-year-old female with some paresthesias and elevated blood pressure. Checking labs to look for any evidence of endorgan damage from hypertension. We will titrate medications with PRN antihypertensive as needed. Labs Reviewed and are benign. I did start her on a as needed clonidine have her follow-up primary care and cardiology. FINAL IMPRESSION      1. Hypertension, unspecified type          DISPOSITION/PLAN   DISPOSITION        PATIENT REFERREDTO:  Cristopher Cochran MD  [address] [place] [address] RD.   [facility] [address]  [phone]            DISCHARGE MEDICATIONS:  New Prescriptions    CLONIDINE (CATAPRES) 0.1 MG TABLET    Take 1 tablet by mouth daily as needed for High Blood Pressure       DISCONTINUED MEDICATIONS:  Discontinued Medications    No medications on file              (Please note that portions ofthis note were completed with a voice recognition program.  Efforts were made to edit the dictations but occasionally words are mis-transcribed.)    Shereen Cochran MD (electronically signed)           Shereen Cochran MD  02/18/22 1664

## 2022-02-18 ENCOUNTER — APPOINTMENT (OUTPATIENT)
Dept: CARDIAC REHAB | Age: 62
End: 2022-02-18
Payer: COMMERCIAL

## 2022-02-18 ENCOUNTER — OFFICE VISIT (OUTPATIENT)
Dept: FAMILY MEDICINE CLINIC | Age: 62
End: 2022-02-18
Payer: COMMERCIAL

## 2022-02-18 ENCOUNTER — TELEPHONE (OUTPATIENT)
Dept: FAMILY MEDICINE CLINIC | Age: 62
End: 2022-02-18

## 2022-02-18 VITALS
WEIGHT: 193 LBS | TEMPERATURE: 96.6 F | BODY MASS INDEX: 32.87 KG/M2 | SYSTOLIC BLOOD PRESSURE: 130 MMHG | OXYGEN SATURATION: 98 % | DIASTOLIC BLOOD PRESSURE: 78 MMHG | HEART RATE: 71 BPM

## 2022-02-18 DIAGNOSIS — R06.02 SOB (SHORTNESS OF BREATH) ON EXERTION: ICD-10-CM

## 2022-02-18 DIAGNOSIS — Z99.89 OSA ON CPAP: ICD-10-CM

## 2022-02-18 DIAGNOSIS — Z87.891 SMOKING HISTORY: ICD-10-CM

## 2022-02-18 DIAGNOSIS — G47.33 OSA ON CPAP: ICD-10-CM

## 2022-02-18 DIAGNOSIS — E04.2 MULTINODULAR GOITER: ICD-10-CM

## 2022-02-18 DIAGNOSIS — I25.10 CORONARY ARTERY DISEASE INVOLVING NATIVE CORONARY ARTERY OF NATIVE HEART WITHOUT ANGINA PECTORIS: Chronic | ICD-10-CM

## 2022-02-18 DIAGNOSIS — I10 ESSENTIAL HYPERTENSION: Primary | Chronic | ICD-10-CM

## 2022-02-18 LAB
EKG ATRIAL RATE: 77 BPM
EKG DIAGNOSIS: NORMAL
EKG P AXIS: 59 DEGREES
EKG P-R INTERVAL: 186 MS
EKG Q-T INTERVAL: 388 MS
EKG QRS DURATION: 84 MS
EKG QTC CALCULATION (BAZETT): 439 MS
EKG R AXIS: -2 DEGREES
EKG T AXIS: 24 DEGREES
EKG VENTRICULAR RATE: 77 BPM

## 2022-02-18 PROCEDURE — 99214 OFFICE O/P EST MOD 30 MIN: CPT | Performed by: FAMILY MEDICINE

## 2022-02-18 PROCEDURE — 93010 ELECTROCARDIOGRAM REPORT: CPT | Performed by: INTERNAL MEDICINE

## 2022-02-18 RX ORDER — LISINOPRIL 20 MG/1
20 TABLET ORAL 2 TIMES DAILY
Qty: 180 TABLET | Refills: 3 | Status: SHIPPED | OUTPATIENT
Start: 2022-02-18

## 2022-02-18 RX ORDER — HYDROCHLOROTHIAZIDE 12.5 MG/1
12.5 CAPSULE, GELATIN COATED ORAL EVERY MORNING
Qty: 90 CAPSULE | Refills: 3 | Status: ON HOLD | OUTPATIENT
Start: 2022-02-18 | End: 2022-06-23 | Stop reason: HOSPADM

## 2022-02-18 NOTE — PROGRESS NOTES
Ambar Coelho is a 57 y.o. female. HPI:  Here for BP check   BP elevated after she had a stress test and with cardiac rehab    Patient has obstructive sleep apnea awaiting CPAP machine which may be contributing to her blood pressure elevated  Also has a history of smoking at least 25 pack years, quit 2000  Recommend PFTs, will start her on Advair in the  She Does get somewhat wheezy and tight in her chest with exertion    Meds, vitamins and allergies reviewed with pt  Wt Readings from Last 3 Encounters:   02/18/22 193 lb (87.5 kg)   02/07/22 197 lb 6.4 oz (89.5 kg)   12/13/21 196 lb (88.9 kg)       REVIEW OF SYSTEMS:   CONSTITUTIONAL: See history of present illness,   Weight noted   HEENT: No new vision difficulties or ringing in the ears. RESPIRATORY: No new SOB, PND, orthopnea or cough. CARDIOVASCULAR: no CP, palpitations or SOB with exertion  GI: No nausea, vomiting, diarrhea, constipation, abdominal pain or changes in bowel habits. : No urinary frequency, urgency, incontinence hematuria or dysuria. SKIN: No cyanosis or skin lesions. MUSCULOSKELETAL: No new muscle or joint pain. NEUROLOGICAL: No syncope or TIA-like symptoms. PSYCHIATRIC: No anxiety, insomnia or depression     Allergies   Allergen Reactions    Penicillins Anaphylaxis    Levaquin [Levofloxacin In D5w] Other (See Comments)     Joint pain    Morphine Rash       Prior to Visit Medications    Medication Sig Taking?  Authorizing Provider   fluticasone-salmeterol (ADVAIR HFA) 230-21 MCG/ACT inhaler Inhale 1-2 puffs into the lungs 2 times daily Yes Jerrell Lundborg, MD   lisinopril (PRINIVIL;ZESTRIL) 20 MG tablet Take 1 tablet by mouth 2 times daily Yes Jerrell Lundborg, MD   hydroCHLOROthiazide (MICROZIDE) 12.5 MG capsule Take 1 capsule by mouth every morning Yes Jerrell Lundborg, MD   cloNIDine (CATAPRES) 0.1 MG tablet Take 1 tablet by mouth daily as needed for High Blood Pressure Yes Leisa Madsen MD   nitroGLYCERIN (NITROSTAT) 0.4 MG SL tablet Place 1 tablet under the tongue every 5 minutes as needed for Chest pain Yes Francoise Kaur MD   metoprolol succinate (TOPROL XL) 25 MG extended release tablet Take 0.5 tablets by mouth daily Yes DIMAS Gay CNP   famotidine (PEPCID) 20 MG tablet Take 1 tablet by mouth 2 times daily Yes Giovanni Horowitz MD   Cholecalciferol (VITAMIN D) 50 MCG (2000 UT) CAPS capsule Take 2 capsules by mouth daily Yes Historical Provider, MD   rosuvastatin (CRESTOR) 20 MG tablet Take 1 tablet by mouth daily Yes Francoise Kaur MD   clopidogrel (PLAVIX) 75 MG tablet Take 1 tablet by mouth daily Yes Francoise Kaur MD   potassium chloride (KLOR-CON M) 10 MEQ extended release tablet Take 1 tablet by mouth daily Yes Giovanni Horowitz MD   aspirin 81 MG EC tablet Take 81 mg by mouth daily Yes Historical Provider, MD       Past Medical History:   Diagnosis Date    Arthritis     R KNEE-EVENTUALLY NEEDS RTKR    Breast lump or mass     CAD (coronary artery disease)     Gallstones     Hyperlipidemia     Hypertension     Thyroid disease     MULTINODULAR-6/2020       Social History     Tobacco Use    Smoking status: Former Smoker     Packs/day: 2.00     Years: 27.00     Pack years: 54.00     Types: Cigarettes     Start date: 1/4/1981     Quit date: 1/1/2007     Years since quitting: 15.1    Smokeless tobacco: Never Used   Substance Use Topics    Alcohol use: Never     Alcohol/week: 0.0 standard drinks       Family History   Problem Relation Age of Onset    Cancer Mother 52        UTERINE AND LUNG, SMOKER    Cancer Father 64        THROAT CANCER    Heart Disease Father     Cancer Brother 48        THROAT    Heart Disease Other        OBJECTIVE:  /78   Pulse 71   Temp 96.6 °F (35.9 °C)   Wt 193 lb (87.5 kg)   SpO2 98%   BMI 32.87 kg/m²   GEN:  in NAD  HEENT:  NCAT  NECK:  Supple without adenopathy.   CV:  Regular rate and rhythm, S1 and S2 normal, no murmurs, clicks  PULM:  Chest is clear, no wheezing , symmetric air entry throughout both lung fields. ABD: Soft, NT, no masses appear  EXT: No rash or edema  NEURO: Alert oriented ×3, nonfocal, no assistive device    ASSESSMENT/PLAN:  1. Essential hypertension  BP today  Continue lisinopril 20 mg twice daily, HCTZ 12.5 mg daily  Use clonidine as needed blood pressure elevation     Follow-up in 2 to 4 weeks or as needed     2. ARLENE on CPAP  Had trouble getting the machine, will get the machine hopefully this weekend and start using!!  Should help blood pressure, shortness of breath etc.    3. Smoking history  screen  - Full PFT Study With Bronchodilator; Future    4. SOB (shortness of breath) on exertion  screen  - Full PFT Study With Bronchodilator; Future  Trial of Advair 1 to 2 puffs twice a day     5. Coronary artery disease involving native coronary artery of native heart without angina pectoris  Sees cardiology    6.  Multinodular goiter  Sees Dr. Montserrat Garcia, who is recommending partial thyroidectomy for solid thyroid nodule       30 Total Minutes spent pre charting (reviewing problem list, meds, any test results, consultant and hospital notes ) and  obtaining present visit history, performing appropriate medical exam/evaluation, counseling and educating the patient (and family), ordering medications ,tests, and procedures as needed, refilling medication(s), placing referral(s) when needed in addition to coordinating care for this patient and documenting in electronic health record

## 2022-02-18 NOTE — TELEPHONE ENCOUNTER
Patient was seen yesterday at the ED for dizziness and HBP. Her BP was 191/101. They gave her another BP medicine to take, but her BP was 153/85 this morning. She is scheduled at 2:15 today, but wondered if Dr. Sherrell Ramos wanted her to come in earlier?     Please advise

## 2022-02-21 ENCOUNTER — APPOINTMENT (OUTPATIENT)
Dept: CARDIAC REHAB | Age: 62
End: 2022-02-21
Payer: COMMERCIAL

## 2022-02-21 DIAGNOSIS — E04.1 THYROID NODULE: ICD-10-CM

## 2022-02-21 LAB
T3 TOTAL: 1.77 NG/ML (ref 0.8–2)
T4 FREE: 1.3 NG/DL (ref 0.9–1.8)
TSH SERPL DL<=0.05 MIU/L-ACNC: 0.1 UIU/ML (ref 0.27–4.2)

## 2022-02-23 ENCOUNTER — APPOINTMENT (OUTPATIENT)
Dept: CARDIAC REHAB | Age: 62
End: 2022-02-23
Payer: COMMERCIAL

## 2022-02-24 ENCOUNTER — TELEPHONE (OUTPATIENT)
Dept: ENDOCRINOLOGY | Age: 62
End: 2022-02-24

## 2022-02-24 ENCOUNTER — OFFICE VISIT (OUTPATIENT)
Dept: ENDOCRINOLOGY | Age: 62
End: 2022-02-24
Payer: COMMERCIAL

## 2022-02-24 VITALS
HEIGHT: 64 IN | DIASTOLIC BLOOD PRESSURE: 79 MMHG | SYSTOLIC BLOOD PRESSURE: 149 MMHG | BODY MASS INDEX: 33.8 KG/M2 | HEART RATE: 58 BPM | WEIGHT: 198 LBS | RESPIRATION RATE: 16 BRPM

## 2022-02-24 DIAGNOSIS — E04.2 MULTINODULAR GOITER: ICD-10-CM

## 2022-02-24 DIAGNOSIS — E66.9 OBESITY, UNSPECIFIED CLASSIFICATION, UNSPECIFIED OBESITY TYPE, UNSPECIFIED WHETHER SERIOUS COMORBIDITY PRESENT: ICD-10-CM

## 2022-02-24 DIAGNOSIS — E04.1 THYROID NODULE: Primary | ICD-10-CM

## 2022-02-24 PROCEDURE — 99214 OFFICE O/P EST MOD 30 MIN: CPT | Performed by: INTERNAL MEDICINE

## 2022-02-24 RX ORDER — SEMAGLUTIDE 1.34 MG/ML
INJECTION, SOLUTION SUBCUTANEOUS
Qty: 1 PEN | Refills: 0 | COMMUNITY
Start: 2022-02-24 | End: 2022-05-18 | Stop reason: SDUPTHER

## 2022-02-24 RX ORDER — SEMAGLUTIDE 1.34 MG/ML
INJECTION, SOLUTION SUBCUTANEOUS
Qty: 2 ML | Refills: 2 | Status: SHIPPED | OUTPATIENT
Start: 2022-02-24 | End: 2022-02-24 | Stop reason: SDUPTHER

## 2022-02-24 RX ORDER — PEN NEEDLE, DIABETIC 31 GX5/16"
1 NEEDLE, DISPOSABLE MISCELLANEOUS DAILY
Qty: 100 EACH | Refills: 6 | Status: SHIPPED | OUTPATIENT
Start: 2022-02-24 | End: 2022-10-18

## 2022-02-24 NOTE — PATIENT INSTRUCTIONS
Patient Education        semaglutide (oral/injection)  Pronunciation:  FELICITA a GLOO tide  Brand:  Ozempic, Ozempic (1 mg dose), Rybelsus, Wegovy (0.25 mg dose), Wegovy (0.5 mg dose), Wegovy (1 mg dose), Wegovy (1.7 mg dose), Wegovy (2.4 mg dose)  What is the most important information I should know about semaglutide? Call your doctor at once if you have signs of a thyroid tumor, such as swelling or a lump in your neck, trouble swallowing, a hoarse voice, or shortness of breath. You should not use semaglutide if you have multiple endocrine neoplasia type 2 (tumors in your glands), or a personal or family history of medullary thyroid cancer. What is semaglutide? Semaglutide (Rybelsus and Ozempic) is used with diet and exercise to improve blood sugar control in adults with type 2 diabetes mellitus (not for type 1 diabetes). [place] is used with diet and exercise to manage weight in overweight adults who also have least one weight-related medical condition such as type 2 diabetes, high blood pressure, or high cholesterol. Semaglutide may also be used for purposes not listed in this medication guide. What should I discuss with my healthcare provider before using semaglutide? You should not use semaglutide if you are allergic to it, or if you have:  · multiple endocrine neoplasia type 2 (tumors in your glands);  · a personal or family history of medullary thyroid carcinoma (a type of thyroid cancer); or  · diabetic ketoacidosis (call your doctor for treatment). Tell your doctor if you have ever had:  · a stomach or intestinal disorder;  · pancreatitis;  · kidney disease; or  · eye problems caused by diabetes (retinopathy). In animal studies, semaglutide caused thyroid tumors or thyroid cancer. It is not known whether these effects would occur in people. Ask your doctor about your risk. Stop using this medicine at least 2 months before you plan to get pregnant.  Ask your doctor for a safer medicine to use during this time. Controlling diabetes is very important during pregnancy, as is gaining the right amount of weight. Even if you are overweight, losing weight during pregnancy could harm the unborn baby. Ask a doctor if it is safe to breastfeed while using this Ozempic or Wegovy. You should not breastfeed while using Rybelsus. Semaglutide is not approved for use by anyone younger than 25years old. How should I use semaglutide? Follow all directions on your prescription label and read all medication guides or instruction sheets. Use the medicine exactly as directed. Semaglutide is usually started at a low dose that is gradually increased every 4 weeks to 30 days. Follow your doctor's dosing instructions very carefully. Rybelsus (oral) is taken by mouth, usually once per day when you first wake up and at least 30 minutes before you eat or drink anything. Take with no more than 4 ounces of water. Swallow the tablet whole and do not crush, chew, or break it. Eat within 30 to 60 minutes after taking Rybelsus. Ozempic and Wegovy are injected under the skin, usually once per week at any time of the day, with or without food. Use an injection on the same day each week. Read and follow all Instructions for Use. Ask your doctor or pharmacist if you need help. Prepare an injection only when you are ready to give it. Call your pharmacist if the medicine looks cloudy, has changed colors, or has particles in it. Your healthcare provider will show you where to inject semaglutide. Do not inject into the same place two times in a row. If you choose a different weekly injection day, start your new schedule after at least 2 days have passed since the last injection you gave. Do not use different brands of semaglutide at the same time. Blood sugar can be affected by stress, illness, surgery, exercise, alcohol use, or skipping meals. Low blood sugar (hypoglycemia) can make you feel very hungry, dizzy, irritable, or shaky.  To quickly treat hypoglycemia, eat or drink hard candy, crackers, raisins, fruit juice, or non-diet soda. Your doctor may prescribe glucagon injection in case of severe hypoglycemia. Tell your doctor if you have frequent symptoms of high blood sugar (hyperglycemia) such as increased thirst or urination. Ask your doctor before changing your dose or medication schedule. Your treatment may also include diet, exercise, weight control, medical tests, and special medical care. You may get dehydrated during prolonged illness. Call your doctor if you are sick with vomiting or diarrhea, or if you eat or drink less than usual.  Store Rybelsus in the original package at room temperature, away from moisture and heat. Store unopened [facility] or [facility] injection pens in the original carton in a refrigerator, protected from light. Do not use past the expiration date. Throw away an injection pen that has been frozen. If needed, you may store an unopened Wegovy pen at cool room temperature for up to 28 days. Do not remove the cap until you are ready to use the injection pen. The pen contains a single dose. Throw the pen away after one use, even if there is still medicine left inside. The Ozempic injection pen contains more than one dose. After your first use, store the pen with the needle removed in a refrigerator or at room temperature. Protect from heat and light. Keep the cap on when not in use. Throw the pen away 56 days after the first use, or if less than 0.25 mg is shown on the dose counter. Do not reuse a needle. Place it in a puncture-proof \"sharps\" container and dispose of it following state or local laws. Keep out of the reach of children and pets. What happens if I miss a dose? For Rybelsus: Skip the missed dose and use your next dose at the regular time. For Ozempic: Use the medicine as soon as you can and then go back to your regular schedule.  If you are more than 5 days late for the injection, skip the missed dose and return to your regular schedule. For NBWRVE: Use the medicine as soon as you can and then go back to your regular schedule. If your next dose is due in less than 2 days (48 hours), skip the missed dose and return to your regular schedule. Do not use two doses of semaglutide at one time. Call your doctor if you miss more than 2 doses in a row of Wegovy. You may need to restart the medicine at a lower dose to avoid stomach problems. What happens if I overdose? Seek emergency medical attention or call the Poison Help line at 1-992.171.4849. Overdose may cause severe nausea, vomiting, or low blood sugar. What should I avoid while using semaglutide? Never share an injection pen, even if you changed the needle. Sharing this device can pass infection or disease from person to person. What are the possible side effects of semaglutide? Get emergency medical help if you have signs of an allergic reaction:  hives, itching; dizziness, fast heartbeats; difficult breathing; swelling of your face, lips, tongue, or throat.   Call your doctor at once if you have:  · vision changes;  · unusual mood changes, thoughts about hurting yourself;  · pounding heartbeats or fluttering in your chest;  · a light-headed feeling, like you might pass out;  · signs of a thyroid tumor --swelling or a lump in your neck, trouble swallowing, a hoarse voice, feeling short of breath;  · symptoms of pancreatitis --severe pain in your upper stomach spreading to your back, nausea with or without vomiting, fast heart rate;  · gallbladder problems --upper stomach pain, fever, jack-colored stools, jaundice (yellowing of the skin or eyes);  · low blood sugar --headache, hunger, weakness, sweating, confusion, irritability, dizziness, fast heart rate, or feeling jittery;  · kidney problems --swelling, urinating less, feeling tired or short of breath; or  · stomach flu symptoms --stomach cramps, vomiting, loss of appetite, diarrhea (may be watery or bloody). Common side effects may include:  · low blood sugar (in people with type 2 diabetes);  · upset stomach, heartburn, burping, gas, bloating;  · nausea, vomiting, stomach pain, loss of appetite;  · diarrhea, constipation;  · stomach flu symptoms; o  · headache, dizziness, tiredness. This is not a complete list of side effects and others may occur. Call your doctor for medical advice about side effects. You may report side effects to FDA at 9-574-JIS-4695. What other drugs will affect semaglutide? Semaglutide can slow your digestion, and it may take longer for your body to absorb any medicines you take by mouth. Tell your doctor about all your other medicines, especially insulin or other diabetes medicine, such as dulaglutide, exenatide, liraglutide, Byetta, Trulicity, Victoza, and others. Other drugs may affect semaglutide, including prescription and over-the-counter medicines, vitamins, and herbal products. Tell your doctor about all other medicines you use. Where can I get more information? Your pharmacist can provide more information about semaglutide. Remember, keep this and all other medicines out of the reach of children, never share your medicines with others, and use this medication only for the indication prescribed. Every effort has been made to ensure that the information provided by [address] is accurate, up-to-date, and complete, but no guarantee is made to that effect. Drug information contained herein may be time sensitive. [facility] information has been compiled for use by healthcare practitioners and consumers in the [facility] and therefore [facility] does not warrant that uses outside of the [facility] are appropriate, unless specifically indicated otherwise. [facility]'s drug information does not endorse drugs, diagnose patients or recommend therapy.  [facility]'s drug information is an informational resource designed to assist licensed healthcare practitioners in caring for their patients and/or to serve consumers viewing this service as a supplement to, and not a substitute for, the expertise, skill, knowledge and judgment of healthcare practitioners. The absence of a warning for a given drug or drug combination in no way should be construed to indicate that the drug or drug combination is safe, effective or appropriate for any given patient. [facility] does not assume any responsibility for any aspect of healthcare administered with the aid of information [facility] provides. The information contained herein is not intended to cover all possible uses, directions, precautions, warnings, drug interactions, allergic reactions, or adverse effects. If you have questions about the drugs you are taking, check with your doctor, nurse or pharmacist.  Copyright 8245-2217 [address]: 4.01. Revision date: 7/13/2021. Care instructions adapted under license by [facility] ([facility]). If you have questions about a medical condition or this instruction, always ask your healthcare professional. [place] [address] any warranty or liability for your use of this information.

## 2022-02-24 NOTE — PROGRESS NOTES
Brian Kirkpatrick is a 57 y.o. female who is here for management  Of obesity, prediabetes and thyroid nodule. Patient has a PMH of HTN, breast lump, hyperlipidemia. Patient had Thyroid US done in  06/20 which showed nodules   In right lobe , dominant nodule measures 1.2 cm solid . In left lobe , dominant nodule measures 2.8 cm in mid pole and 1.8 cm in lower pole. Occasional difficulty swallowing. Sister has h/o multinodular goiter and had recent surgery. There was no thyroid cancer diagnosed in the family  No FH of thyroid cancer  No History of exposure to radiation as a child. She has strong FH of DM  She has tried different diets . Unable to stay on diet long term. 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 INTERIM   She underwent stents in coronaries in Nov 2021   She was diagnosed with sleep apnea in jan 2022   She had Covid in jan 2022 which was mild   --BP has been staying elevated and her medication are being adjusted by cardiology. Blood pressure has improved though she denies any chest pain shortness of breath or headache.       Allergies   Allergen Reactions    Penicillins Anaphylaxis    Levaquin [Levofloxacin In D5w] Other (See Comments)     Joint pain    Morphine Rash     Outpatient Medications Marked as Taking for the 2/24/22 encounter (Office Visit) with Lanell Phalen, MD   Medication Sig Dispense Refill    Insulin Pen Needle (B-D UF III MINI PEN NEEDLES) 31G X 5 MM MISC 1 each by Does not apply route daily 100 each 6    Semaglutide,0.25 or 0.5MG/DOS, (OZEMPIC, 0.25 OR 0.5 MG/DOSE,) 2 MG/1.5ML SOPN 0.25 mg 1 pen 0    fluticasone-salmeterol (ADVAIR HFA) 230-21 MCG/ACT inhaler Inhale 1-2 puffs into the lungs 2 times daily 1 each 3    lisinopril (PRINIVIL;ZESTRIL) 20 MG tablet Take 1 tablet by mouth 2 times daily 180 tablet 3    hydroCHLOROthiazide (MICROZIDE) 12.5 MG capsule Take 1 capsule by mouth every morning 90 capsule 3    cloNIDine (CATAPRES) 0.1 MG tablet Take 1 tablet by mouth daily as needed for High Blood Pressure 30 tablet 0    nitroGLYCERIN (NITROSTAT) 0.4 MG SL tablet Place 1 tablet under the tongue every 5 minutes as needed for Chest pain 25 tablet 3    metoprolol succinate (TOPROL XL) 25 MG extended release tablet Take 0.5 tablets by mouth daily 90 tablet 1    famotidine (PEPCID) 20 MG tablet Take 1 tablet by mouth 2 times daily 60 tablet 3    Cholecalciferol (VITAMIN D) 50 MCG (2000 UT) CAPS capsule Take 2 capsules by mouth daily      rosuvastatin (CRESTOR) 20 MG tablet Take 1 tablet by mouth daily 90 tablet 1    clopidogrel (PLAVIX) 75 MG tablet Take 1 tablet by mouth daily 90 tablet 3    potassium chloride (KLOR-CON M) 10 MEQ extended release tablet Take 1 tablet by mouth daily 90 tablet 3    aspirin 81 MG EC tablet Take 81 mg by mouth daily           Vitals:    02/24/22 0910   BP: (!) 149/79   Pulse: 58   Resp: 16   Weight: 198 lb (89.8 kg)   Height: 5' 4.25\" (1.632 m)       Past Medical History:   Diagnosis Date    Arthritis     R KNEE-EVENTUALLY NEEDS RTKR    Breast lump or mass     CAD (coronary artery disease)     Gallstones     Hyperlipidemia     Hypertension     Thyroid disease     MULTINODULAR-6/2020     Past Surgical History:   Procedure Laterality Date    BREAST SURGERY  [id number]    CHOLECYSTECTOMY, LAPAROSCOPIC  02/03/2017    COLONOSCOPY  8.1.2010    CYSTOSCOPY  7.19.2007    HYSTERECTOMY  2007    SKIN BIOPSY      UPPER CHEST MOLE BX-2010    TONSILLECTOMY      AGE 22 YRS.       Family History   Problem Relation Age of Onset    Cancer Mother 52        UTERINE AND LUNG, SMOKER    Cancer Father 64        THROAT CANCER    Heart Disease Father     Cancer Brother 48        THROAT    Heart Disease Other      Social History     Tobacco Use   Smoking Status Former Smoker    Packs/day: 2.00    Years: 27.00    Pack years: 54.00    Types: Cigarettes    Start date: 1/4/1981   [place] Quit date: 1/1/2007    Years since quitting: 15.1   Smokeless Tobacco Never Used      Social History     Substance and Sexual Activity   Alcohol Use Never    Alcohol/week: 0.0 standard drinks         ROS  I have reviewed the review of system questionnaire filled by the patient . Patient was advised to contact PCP for non endocrine signs and symptoms     EXAM   Constitutional: no acute distress, well appearing, well nourished  Psychiatric: oriented to person, place and time, judgement, insight and normal, recent and remote memory and intact and mood, affect are normal  Skin: skin and subcutaneous tissue is normal without mass,   Head and Face: examination of head and face revealed no abnormalities  Eyes: no lid or conjunctival swelling, no erythema or discharge, pupils are normal,   Ears/Nose: external inspection of ears and nose revealed no abnormalities, hearing is grossly normal  Oropharynx/Mouth/Face: lips, tongue and gums are normal with no lesions, the voice quality was normal  Neck: neck is supple and symmetric, with midline trachea and no masses, thyroid is enlarged    Pulmonary: no increased work of breathing or signs of respiratory distress, lungs are clear to auscultation  Cardiovascular: normal heart rate and rhythm, normal S1 and S2,   Musculoskeletal: normal gait and station,   Neurological: normal coordination, normal general cortical function        Assessment/Plan    --Obesity with pre-diabetes . Discussed diet changes. Aic 5.9   Follows with her primary care physician. We will start her on Ozempic 0.25 mg weekly to help with diet and weight loss in the setting of prediabetes.     All possible Side effects were discussed in detail with patient in detail and patient was advised to call our office if she  notes any side effects shewas given are written handout detailing side effects from the medication.        ---- MULTINODULAR GOITER   She has  subclinical hyperthyroidism   TSH 0.76 in 06/20>>0.12 in July 2021m  She denies significant symptoms of we will continue to monitor  At her last visit I had discussed the thyroid ultrasound done in August 2021  In right lobe , dominant nodule measures 1.2 cm solid . In left lobe nodule previously not biopsied now shows some suspicious features like calcification , dominant nodule measures 2.8 cm in mid pole is the same  I discussed FNA versus 6-month follow-up, patient chose to go with 6 months follow-up. Repeat thyroid ultrasound in January 2022 showed stable multinodular goiter patient was advised to continue with surveillance every 6 months. Thyroid ultrasound order placed today. It was explained to the patient if she wants to be 100% sure then she can have thyroidectomy done as she is having some swallowing issues. ---- [facility] at [facility].  8/2020 no malignant cells identified       ---Hyperlipidemia    On crestor 5 mg daily. Managed by PCP.     ---- HTN   High BP. She feels she has whitecoat hypertension as in her home log blood pressure is always stable. Follow-up with PCP.     ---CAD s/p stent in Nov 2021   Follows with cardiology. She has premature heart disease in her family. Return in about 3 months (around 5/24/2022).

## 2022-02-24 NOTE — Clinical Note
Hi there I am starting Cait Marcelo on GLP-1 agonist due to her inability to lose weight as well as her prediabetic status, now along with coronary artery disease her risk factors have been increasing.   Take care

## 2022-02-25 ENCOUNTER — APPOINTMENT (OUTPATIENT)
Dept: CARDIAC REHAB | Age: 62
End: 2022-02-25
Payer: COMMERCIAL

## 2022-02-28 ENCOUNTER — APPOINTMENT (OUTPATIENT)
Dept: CARDIAC REHAB | Age: 62
End: 2022-02-28
Payer: COMMERCIAL

## 2022-03-01 ENCOUNTER — APPOINTMENT (OUTPATIENT)
Dept: GENERAL RADIOLOGY | Age: 62
End: 2022-03-01
Payer: COMMERCIAL

## 2022-03-01 ENCOUNTER — HOSPITAL ENCOUNTER (EMERGENCY)
Age: 62
Discharge: HOME | End: 2022-03-01
Attending: EMERGENCY MEDICINE
Payer: COMMERCIAL

## 2022-03-01 VITALS
HEIGHT: 64 IN | HEART RATE: 84 BPM | SYSTOLIC BLOOD PRESSURE: 171 MMHG | TEMPERATURE: 98.4 F | BODY MASS INDEX: 34.18 KG/M2 | WEIGHT: 200.2 LBS | RESPIRATION RATE: 16 BRPM | OXYGEN SATURATION: 96 % | DIASTOLIC BLOOD PRESSURE: 94 MMHG

## 2022-03-01 DIAGNOSIS — M70.31 BURSITIS OF RIGHT ELBOW, UNSPECIFIED BURSA: Primary | ICD-10-CM

## 2022-03-01 LAB
ANION GAP SERPL CALCULATED.3IONS-SCNC: 12 MMOL/L (ref 3–16)
BASOPHILS ABSOLUTE: 0.1 K/UL (ref 0–0.2)
BASOPHILS RELATIVE PERCENT: 0.8 %
BUN BLDV-MCNC: 20 MG/DL (ref 7–20)
CALCIUM SERPL-MCNC: 9.8 MG/DL (ref 8.3–10.6)
CHLORIDE BLD-SCNC: 103 MMOL/L (ref 99–110)
CO2: 25 MMOL/L (ref 21–32)
CREAT SERPL-MCNC: <0.5 MG/DL (ref 0.6–1.2)
D DIMER: <200 NG/ML DDU (ref 0–229)
EOSINOPHILS ABSOLUTE: 0.3 K/UL (ref 0–0.6)
EOSINOPHILS RELATIVE PERCENT: 3.8 %
GFR AFRICAN AMERICAN: >60
GFR NON-AFRICAN AMERICAN: >60
GLUCOSE BLD-MCNC: 107 MG/DL (ref 70–99)
HCT VFR BLD CALC: 41.2 % (ref 36–48)
HEMOGLOBIN: 13.6 G/DL (ref 12–16)
LYMPHOCYTES ABSOLUTE: 2 K/UL (ref 1–5.1)
LYMPHOCYTES RELATIVE PERCENT: 23.3 %
MCH RBC QN AUTO: 27.7 PG (ref 26–34)
MCHC RBC AUTO-ENTMCNC: 32.9 G/DL (ref 31–36)
MCV RBC AUTO: 84.2 FL (ref 80–100)
MONOCYTES ABSOLUTE: 0.5 K/UL (ref 0–1.3)
MONOCYTES RELATIVE PERCENT: 5.5 %
NEUTROPHILS ABSOLUTE: 5.6 K/UL (ref 1.7–7.7)
NEUTROPHILS RELATIVE PERCENT: 66.6 %
PDW BLD-RTO: 13.8 % (ref 12.4–15.4)
PLATELET # BLD: 270 K/UL (ref 135–450)
PMV BLD AUTO: 8.4 FL (ref 5–10.5)
POTASSIUM SERPL-SCNC: 3.7 MMOL/L (ref 3.5–5.1)
RBC # BLD: 4.89 M/UL (ref 4–5.2)
SODIUM BLD-SCNC: 140 MMOL/L (ref 136–145)
WBC # BLD: 8.5 K/UL (ref 4–11)

## 2022-03-01 PROCEDURE — 99283 EMERGENCY DEPT VISIT LOW MDM: CPT

## 2022-03-01 PROCEDURE — 85025 COMPLETE CBC W/AUTO DIFF WBC: CPT

## 2022-03-01 PROCEDURE — 80048 BASIC METABOLIC PNL TOTAL CA: CPT

## 2022-03-01 PROCEDURE — 73080 X-RAY EXAM OF ELBOW: CPT

## 2022-03-01 PROCEDURE — 85379 FIBRIN DEGRADATION QUANT: CPT

## 2022-03-01 ASSESSMENT — ENCOUNTER SYMPTOMS
DIARRHEA: 0
CHEST TIGHTNESS: 0
ABDOMINAL PAIN: 0
VOMITING: 0
SHORTNESS OF BREATH: 0
NAUSEA: 0

## 2022-03-01 NOTE — ED PROVIDER NOTES
[facility]        Pt Name: Humberto Ortega  MRN: [id number]  [place] 1960  Date of evaluation: 3/1/2022  Provider: DIMAS Sorensen CNP  PCP: Ildefonso Sesay MD  Note Started: 5:06 PM EST        I have seen and evaluated this patient with my supervising physician Kimberly Epps MD.    [facility]       Chief Complaint   Patient presents with   Garcia Bleeding/Bruising     Swelling/ bruising on right elbow x 3 days. Cardiac pt (Dr. Beverly Childers). Denies injury to elbow, denies pain. HISTORY OF PRESENT ILLNESS   (Location, Timing/Onset, Context/Setting, Quality, Duration, Modifying Factors, Severity, Associated Signs and Symptoms)  Note limiting factors. Chief Complaint: right elbow swelling     Humberto Ortega is a 57 y.o. female who presents to the emergency department complaint of swelling to the right elbow. Reports that she awoke this morning with swelling around the right elbow with bruising. She is concerned that she may have a blood clot. No history of dvt or PE, denies any sob today. She does take plavix, no trauma. Denies any headache, fever, lightheadedness, dizziness, visual disturbances. No chest pain or pressure. No neck or back pain. No shortness of breath, cough, or congestion. No abdominal pain, nausea, vomiting, diarrhea, constipation, or dysuria. No rash. Nursing Notes were all reviewed and agreed with or any disagreements were addressed in the HPI. REVIEW OF SYSTEMS    (2-9 systems for level 4, 10 or more for level 5)     Review of Systems   Constitutional: Negative for activity change, chills and fever. Respiratory: Negative for chest tightness and shortness of breath. Cardiovascular: Negative for chest pain. Gastrointestinal: Negative for abdominal pain, diarrhea, nausea and vomiting. Genitourinary: Negative for dysuria.    Musculoskeletal:        Swelling right elbow All other systems reviewed and are negative. Positives and Pertinent negatives as per HPI. Except as noted above in the ROS, all other systems were reviewed and negative. PAST MEDICAL HISTORY     Past Medical History:   Diagnosis Date    Arthritis     R KNEE-EVENTUALLY NEEDS RTKR    Breast lump or mass     CAD (coronary artery disease)     Gallstones     Hyperlipidemia     Hypertension     Thyroid disease     MULTINODULAR-6/2020         SURGICAL HISTORY     Past Surgical History:   Procedure Laterality Date    BREAST SURGERY  [id number]    CHOLECYSTECTOMY, LAPAROSCOPIC  02/03/2017    COLONOSCOPY  8.1.2010    CYSTOSCOPY  7.19.2007    HYSTERECTOMY  2007    SKIN BIOPSY      UPPER CHEST MOLE BX-2010    TONSILLECTOMY      AGE 22 YRS.           [address]       Discharge Medication List as of 3/1/2022  5:36 PM      CONTINUE these medications which have NOT CHANGED    Details   Insulin Pen Needle (B-D UF III MINI PEN NEEDLES) 31G X 5 MM MISC DAILY Starting Thu 2/24/2022, Disp-100 each, R-6, Normal      Semaglutide,0.25 or 0.5MG/DOS, (OZEMPIC, 0.25 OR 0.5 MG/DOSE,) 2 MG/1.5ML SOPN 0.25 mg, Disp-1 pen, R-0Sample      fluticasone-salmeterol (ADVAIR HFA) 230-21 MCG/ACT inhaler Inhale 1-2 puffs into the lungs 2 times daily, Disp-1 each, R-3Normal      lisinopril (PRINIVIL;ZESTRIL) 20 MG tablet Take 1 tablet by mouth 2 times daily, Disp-180 tablet, R-3Normal      hydroCHLOROthiazide (MICROZIDE) 12.5 MG capsule Take 1 capsule by mouth every morning, Disp-90 capsule, R-3Normal      cloNIDine (CATAPRES) 0.1 MG tablet Take 1 tablet by mouth daily as needed for High Blood Pressure, Disp-30 tablet, R-0Normal      nitroGLYCERIN (NITROSTAT) 0.4 MG SL tablet Place 1 tablet under the tongue every 5 minutes as needed for Chest pain, Disp-25 tablet, R-3Normal      metoprolol succinate (TOPROL XL) 25 MG extended release tablet Take 0.5 tablets by mouth daily, Disp-90 tablet, R-1Normal      famotidine (PEPCID) 20 MG tablet Take 1 tablet by mouth 2 times daily, Disp-60 tablet, R-3Normal      Cholecalciferol (VITAMIN D) 50 MCG (2000 UT) CAPS capsule Take 2 capsules by mouth dailyHistorical Med      rosuvastatin (CRESTOR) 20 MG tablet Take 1 tablet by mouth daily, Disp-90 tablet, R-1Normal      clopidogrel (PLAVIX) 75 MG tablet Take 1 tablet by mouth daily, Disp-90 tablet, R-3Normal      potassium chloride (KLOR-CON M) 10 MEQ extended release tablet Take 1 tablet by mouth daily, Disp-90 tablet, R-3Normal      aspirin 81 MG EC tablet Take 81 mg by mouth dailyHistorical Med               ALLERGIES     Penicillins, Levaquin [levofloxacin in d5w], and Morphine    FAMILYHISTORY       Family History   Problem Relation Age of Onset    Cancer Mother 52        UTERINE AND LUNG, SMOKER    Cancer Father 64        THROAT CANCER    Heart Disease Father     Cancer Brother 48        THROAT    Heart Disease Other           SOCIAL HISTORY       Social History     Tobacco Use    Smoking status: Former Smoker     Packs/day: 2.00     Years: 27.00     Pack years: 54.00     Types: Cigarettes     Start date: 1/4/1981     Quit date: 1/1/2007     Years since quitting: 15.1    Smokeless tobacco: Never Used   Vaping Use    Vaping Use: Never used   Substance Use Topics    Alcohol use: Never     Alcohol/week: 0.0 standard drinks    Drug use: Never       SCREENINGS             PHYSICAL EXAM    (up to 7 for level 4, 8 or more for level 5)     ED Triage Vitals [03/01/22 1613]   BP Temp Temp Source Pulse Resp SpO2 Height Weight   (!) 171/94 98.4 °F (36.9 °C) Oral 84 16 96 % 5' 4\" (1.626 m) 200 lb 3.2 oz (90.8 kg)       Physical Exam  Vitals and nursing note reviewed. Constitutional:       Appearance: She is well-developed. She is not diaphoretic. HENT:      Head: Normocephalic and atraumatic. Right Ear: External ear normal.      Left Ear: External ear normal.   Eyes:      General:         Right eye: No discharge.          Left eye: No discharge. Neck:      Vascular: No JVD. Cardiovascular:      Rate and Rhythm: Normal rate and regular rhythm. Pulses: Normal pulses. Heart sounds: Normal heart sounds. Pulmonary:      Effort: Pulmonary effort is normal. No respiratory distress. Breath sounds: Normal breath sounds. Abdominal:      Palpations: Abdomen is soft. Musculoskeletal:         General: Normal range of motion. Right elbow: Swelling present. Cervical back: Normal range of motion and neck supple. Skin:     General: Skin is warm and dry. Coloration: Skin is not pale. Neurological:      Mental Status: She is alert and oriented to person, place, and time. Psychiatric:         Behavior: Behavior normal.         DIAGNOSTIC RESULTS   LABS:    Labs Reviewed   BASIC METABOLIC PANEL - Abnormal; Notable for the following components:       Result Value    Glucose 107 (*)     CREATININE <0.5 (*)     All other components within normal limits    Narrative:     Performed at:  [facility] [address], [place], [place]   Phone [phone]   CBC WITH AUTO DIFFERENTIAL    Narrative:     Performed at:  [facility] [address], [place] [facility] [place]   Phone [phone]   D-DIMER, QUANTITATIVE    Narrative:     Performed at:  [facility] [address], [place]   Phone [phone]       When ordered only abnormal lab results are displayed. All other labs were within normal range or not returned as of this dictation. EKG: When ordered, EKG's are interpreted by the Emergency Department Physician in the absence of a cardiologist.  Please see their note for interpretation of EKG.     RADIOLOGY:   Non-plain film images such as CT, Ultrasound and MRI are read by the radiologist. Plain radiographic images are visualized and preliminarily interpreted by the ED Provider with the below findings:        Interpretation per the Radiologist below, if available at the time of this note:    XR ELBOW RIGHT (MIN 3 VIEWS)   Final Result      Prominence of the soft tissues posterior to the olecranon may suggest a   olecranon bursitis, including the possibility of a traumatic bursitis,   correlate clinically. Otherwise negative examination of the elbow. No results found. PROCEDURES   Unless otherwise noted below, none     Procedures    CRITICAL CARE TIME       CONSULTS:  None      EMERGENCY DEPARTMENT COURSE and DIFFERENTIAL DIAGNOSIS/MDM:   Vitals:    Vitals:    03/01/22 1613   BP: (!) 171/94   Pulse: 84   Resp: 16   Temp: 98.4 °F (36.9 °C)   TempSrc: Oral   SpO2: 96%   Weight: 200 lb 3.2 oz (90.8 kg)   Height: 5' 4\" (1.626 m)       Patient was given the following medications:  Medications - No data to display        Briefly, this is a 57year old female that presents to the emergency department with swelling over the bursa of the right elbow. Denies injury or trauma. No pain. The area is boggy, not warm or erythematous. Contusion surrounding. Patient was concerned for DVT. Dimer is negative. CBC and BMP are unremarkable. XR ELBOW RIGHT (MIN 3 VIEWS) (Final result)  Result time 03/01/22 17:12:53  Final result by Deyanira Hoang MD (03/01/22 17:12:53)                Impression:      Prominence of the soft tissues posterior to the olecranon may suggest a   olecranon bursitis, including the possibility of a traumatic bursitis,   correlate clinically.  Otherwise negative examination of the elbow. I did share my concern with patient that this is likely an olecranon bursitis. She is instructed on a compression hose to be worn over the area and follow-up with orthopedics. I estimate there is LOW risk for FRACTURE, COMPARTMENT SYNDROME, DEEP VENOUS THROMBOSIS, SEPTIC ARTHRITIS, TENDON OR NEUROVASCULAR INJURY, thus I consider the discharge disposition reasonable. FINAL IMPRESSION      1. Bursitis of right elbow, unspecified bursa          DISPOSITION/PLAN   DISPOSITION Decision To Discharge 03/01/2022 05:28:40 PM      PATIENT REFERRED TO:  Osmar Johnston MD  [address] [place] [address], [address] [place] [address]) [place] [address]  [phone]            DISCHARGE MEDICATIONS:  Discharge Medication List as of 3/1/2022  5:36 PM          DISCONTINUED MEDICATIONS:  Discharge Medication List as of 3/1/2022  5:36 PM                 (Please note that portions of this note were completed with a voice recognition program.  Efforts were made to edit the dictations but occasionally words are mis-transcribed.)    DIMAS Lester CNP (electronically signed)           DIMAS Lester CNP  03/01/22 [id number]

## 2022-03-02 ENCOUNTER — APPOINTMENT (OUTPATIENT)
Dept: CARDIAC REHAB | Age: 62
End: 2022-03-02
Payer: COMMERCIAL

## 2022-03-02 ENCOUNTER — TELEPHONE (OUTPATIENT)
Dept: CARDIAC REHAB | Age: 62
End: 2022-03-02

## 2022-03-03 NOTE — ED PROVIDER NOTES
In addition to the advanced practice provider, I personally saw Marques Bernal and performed a substantive portion of the visit including all aspects of the medical decision making. Briefly, this is a 57 y.o. female here for the ER for evaluation of right elbow bursitis with ecchymosis, antiplatelet agents stool, no known trauma. No pain with range of motion. No sensorimotor deficit. Ecchymosis and bursitis of the right elbow. Labs: D-dimer normal        MDM  No clinical concern for fracture, full range of motion no clinical signs of septic bursitis, she is concerned about DVT, D-dimer is normal.  Continue dual antiplatelet agents compression wrap. Return if worse or new symptoms      Patient Referrals:  Vladimir Councilman, MD  [address] [place] [address]. [address]  [address] [place] [address]  [phone]            Discharge Medications:  Discharge Medication List as of 3/1/2022  5:36 PM          FINAL IMPRESSION  1. Bursitis of right elbow, unspecified bursa        Blood pressure (!) 171/94, pulse 84, temperature 98.4 °F (36.9 °C), temperature source Oral, resp. rate 16, height 5' 4\" (1.626 m), weight 200 lb 3.2 oz (90.8 kg), SpO2 96 %, not currently breastfeeding. For further details of Kimberly De Souza emergency department encounter, please see documentation by advanced practice provider, Rosita Carvalho.     Alyssa Ward MD (electronically signed)  Attending Emergency Physician      Florie Fothergill, MD  84/19/09 1063

## 2022-03-04 ENCOUNTER — TELEPHONE (OUTPATIENT)
Dept: FAMILY MEDICINE CLINIC | Age: 62
End: 2022-03-04

## 2022-03-04 ENCOUNTER — APPOINTMENT (OUTPATIENT)
Dept: CARDIAC REHAB | Age: 62
End: 2022-03-04
Payer: COMMERCIAL

## 2022-03-04 NOTE — TELEPHONE ENCOUNTER
Office has been notified that pt is requiring Prior Authorization for the following medication:  -- ADVAIR DISKUS 250-50MCG/DOSE [place]. Please initiate this request through CoverMyMeds, contacting the following Payor/Insurance:  -- [address]    Please see below, or the documentation attached to this encounter for any additional information that may assist in processing PA:  --PLEASE ADD ICD-10 CODE WITH DIAGNOSIS AND FORWARD TO RYLEE MONROY. Thank you!

## 2022-03-04 NOTE — TELEPHONE ENCOUNTER
Office has been notified that pt is requiring Prior Authorization for the following medication:  --BREO ELLIPTA 200-25MCG/INH [address] ON SCANNED ENCOUNTER. Please initiate this request through CoverMyMeds, contacting the following Payor/Insurance:  -- [employer] OUT OF STATE    Please see below, or the documentation attached to this encounter for any additional information that may assist in processing PA:  --PLEASE ADD ICD-10 CODE ALONG WITH DIAGNOSIS AND FORWARD TO THE PA POOL. Thank you!

## 2022-03-04 NOTE — TELEPHONE ENCOUNTER
Office has been notified that pt is requiring Prior Authorization for the following medication:  -- SYMBICORT I 60-4.5 MCG/ACT AEROSOL    ALTERNATIVE SUGGESTED IN THE SCANNED ENCOUNTER. Please initiate this request through CoverMyMeds, contacting the following Payor/Insurance:  -- [address] Part[address]    Please see below, or the documentation attached to this encounter for any additional information that may assist in processing PA:  --[address]. Thank you!

## 2022-03-04 NOTE — TELEPHONE ENCOUNTER
Submitted PA for Advair -21MCG/ACT aerosol. Via CMM Key: [address]: APPROVED. Medication has been approved through 03/03/2025. If this requires a response please respond to the pool. [address])    Please advise patient. Thank you.

## 2022-03-07 ENCOUNTER — APPOINTMENT (OUTPATIENT)
Dept: CARDIAC REHAB | Age: 62
End: 2022-03-07
Payer: COMMERCIAL

## 2022-03-09 ENCOUNTER — APPOINTMENT (OUTPATIENT)
Dept: CARDIAC REHAB | Age: 62
End: 2022-03-09
Payer: COMMERCIAL

## 2022-03-11 ENCOUNTER — APPOINTMENT (OUTPATIENT)
Dept: CARDIAC REHAB | Age: 62
End: 2022-03-11
Payer: COMMERCIAL

## 2022-03-14 ENCOUNTER — HOSPITAL ENCOUNTER (OUTPATIENT)
Dept: CARDIAC REHAB | Age: 62
Setting detail: THERAPIES SERIES
End: 2022-03-14
Payer: COMMERCIAL

## 2022-03-16 ENCOUNTER — APPOINTMENT (OUTPATIENT)
Dept: CARDIAC REHAB | Age: 62
End: 2022-03-16
Payer: COMMERCIAL

## 2022-03-16 NOTE — ASSESSMENT & PLAN NOTE
Angina~ none   CCS class~1  Intervention  [facility]~ 11/16/21 PCI to RCA  Echo~ 9/30/21 EF 55-60%  Stress~ 11/2021 abnormal -> [facility]  2/2022 low risk scan with hypertensive response to exercise  Current meds~ plavix / asa  Plan~ OK to return to cardiac rehab  DAPT for 12 months

## 2022-03-16 NOTE — PROGRESS NOTES
[facility]   Cardiac Evaluation      Patient: Jacqueline Sanchez  YOB: 1960         Chief Complaint   Patient presents with    Hypertension    Hyperlipidemia    Coronary Artery Disease    Follow-up     3 mo        Referring provider: Natty Catalan MD    History of Present Illness:   Jacqueline Sanchez is a 57 y.o. female here to follow up on CAD, [address], [address] [facility]. Nov '21 scheduled [facility] after abnormal stress test with PCI to RCA. She presented to the hospital the next day with persistent headache and chest pain. Symptoms thought to be related to GERD, anesthesia reaction or post PCI coronary stretching. During cardiac rehab she noticed mild chest tightness again, myoview was ordered. Today she is here to follow up and review results so that she can return to cardiac rehab. She has had a few other issues since her stent. Had ER visit with high blood pressure and dizziness at recommendation of on call . She was given clonidine, but has not started taking it because she has not had high BP since that day. BP stable today. She was back in the ER for what she describes as a large bruised area on her right arm from her mid lower arm up past her elbow. She was told it was bursitis and d/c. She has had no other issues with bleeding. She has had a sleep study and is waiting on a cpap machine. With regard to medication therapy he/she has been compliant with prescribed regimen and has tolerated therapy to date. Past Medical History:   has a past medical history of Arthritis, Breast lump or mass, CAD (coronary artery disease), Gallstones, Hyperlipidemia, Hypertension, and Thyroid disease. Surgical History:   has a past surgical history that includes Colonoscopy (8.1.2010); Hysterectomy (2007); Breast surgery (6451,[id number],5675); Cystoscopy (7.19.2007); Cholecystectomy, laparoscopic (02/03/2017); skin biopsy; and Tonsillectomy.      Current Outpatient Medications   Medication Sig Dispense Refill    carvedilol (COREG) 12.5 MG tablet Take 1 tablet by mouth 2 times daily 180 tablet 3    rosuvastatin (CRESTOR) 5 MG tablet Take 1 tablet by mouth daily 90 tablet 3    Insulin Pen Needle (B-D UF III MINI PEN NEEDLES) 31G X 5 MM MISC 1 each by Does not apply route daily 100 each 6    Semaglutide,0.25 or 0.5MG/DOS, (OZEMPIC, 0.25 OR 0.5 MG/DOSE,) 2 MG/1.5ML SOPN 0.25 mg 1 pen 0    fluticasone-salmeterol (ADVAIR HFA) 230-21 MCG/ACT inhaler Inhale 1-2 puffs into the lungs 2 times daily 1 each 3    lisinopril (PRINIVIL;ZESTRIL) 20 MG tablet Take 1 tablet by mouth 2 times daily 180 tablet 3    hydroCHLOROthiazide (MICROZIDE) 12.5 MG capsule Take 1 capsule by mouth every morning 90 capsule 3    nitroGLYCERIN (NITROSTAT) 0.4 MG SL tablet Place 1 tablet under the tongue every 5 minutes as needed for Chest pain 25 tablet 3    famotidine (PEPCID) 20 MG tablet Take 1 tablet by mouth 2 times daily 60 tablet 3    Cholecalciferol (VITAMIN D) 50 MCG (2000 UT) CAPS capsule Take 2 capsules by mouth daily      clopidogrel (PLAVIX) 75 MG tablet Take 1 tablet by mouth daily 90 tablet 3    potassium chloride (KLOR-CON M) 10 MEQ extended release tablet Take 1 tablet by mouth daily 90 tablet 3    aspirin 81 MG EC tablet Take 81 mg by mouth daily       No current facility-administered medications for this visit.        Allergies:  Penicillins, Levaquin [levofloxacin in d5w], and Morphine     Social History:  Social History     Socioeconomic History    Marital status:      Spouse name: Tobias Forrest Number of children: 2    Years of education: 12    Highest education level: Not on file   Occupational History    Occupation:      Employer: [employer]   Tobacco Use    Smoking status: Former Smoker     Packs/day: 2.00     Years: 27.00     Pack years: 54.00     Types: Cigarettes     Start date: 1/4/1981     Quit date: 1/1/2007     Years since quitting: 15.2    Smokeless tobacco: Never Used   Vaping Use    Vaping Use: Never used   Substance and Sexual Activity    Alcohol use: Never     Alcohol/week: 0.0 standard drinks    Drug use: Never    Sexual activity: Yes     Partners: Male   Other Topics Concern    Not on file   Social History Narrative    Not on file     Social Determinants of Health     Financial Resource Strain: Low Risk     Difficulty of Paying Living Expenses: Not hard at all   Food Insecurity: No Food Insecurity    Worried About Running Out of Food in the Last Year: Never true    Radha of Food in the Last Year: Never true   Transportation Needs: No Transportation Needs    Lack of Transportation (Medical): No    Lack of Transportation (Non-Medical): No   Physical Activity:     Days of Exercise per Week: Not on file    Minutes of Exercise per Session: Not on file   Stress:     Feeling of Stress : Not on file   Social Connections:     Frequency of Communication with Friends and Family: Not on file    Frequency of Social Gatherings with Friends and Family: Not on file    Attends Synagogue Services: Not on file    Active Member of [address] [place] [address] or Organizations: Not on file    Attends Club or Organization Meetings: Not on file    Marital Status: Not on file   Intimate Partner Violence:     Fear of Current or Ex-Partner: Not on file    Emotionally Abused: Not on file    Physically Abused: Not on file    Sexually Abused: Not on file   Housing Stability:     Unable to Pay for Housing in the Last Year: Not on file    Number of [place] in the Last Year: Not on file    Unstable Housing in the Last Year: Not on file       Family History:   Family History   Problem Relation Age of Onset    Cancer Mother 52        UTERINE AND LUNG, SMOKER    Cancer Father 64        THROAT CANCER    Heart Disease Father     Cancer Brother 48        THROAT    Heart Disease Other      Family history has been reviewed and not pertinent except as noted above.      Review of Systems:   · Constitutional: there has been no unanticipated weight loss. No change in energy or activity level   · Eyes: No visual changes   · ENT: No Headaches, hearing loss or vertigo. No mouth sores or sore throat. · Cardiovascular: Reviewed in HPI  · Respiratory: No cough or wheezing, no sputum production. · Gastrointestinal: No abdominal pain, appetite loss, blood in stools. No change in bowel or bladder habits. · Genitourinary: No nocturia, dysuria, trouble voiding  · Musculoskeletal:  No gait disturbance, weakness or joint complaints. · Integumentary: No rash or pruritis. · Neurological: No headache, change in muscle strength, numbness or tingling. No change in gait, balance, coordination, mood, affect, memory, mentation, behavior. · Psychiatric: No anxiety or depression  · Endocrine: No malaise or fever  · Hematologic/Lymphatic: No abnormal bruising or bleeding, blood clots or swollen lymph nodes. · Allergic/Immunologic: No nasal congestion or hives. Physical Examination:    Vitals:    03/18/22 1446   BP: 136/82   Site: Left Upper Arm   Position: Sitting   Cuff Size: Medium Adult   Pulse: 71   SpO2: 98%   Weight: 195 lb 12.8 oz (88.8 kg)   Height: 5' 4\" (1.626 m)     Body mass index is 33.61 kg/m². Wt Readings from Last 3 Encounters:   03/18/22 195 lb 12.8 oz (88.8 kg)   03/01/22 200 lb 3.2 oz (90.8 kg)   02/24/22 198 lb (89.8 kg)      BP Readings from Last 3 Encounters:   03/18/22 136/82   03/01/22 (!) 171/94   02/24/22 (!) 149/79        Physical Examination:    · CONSTITUTIONAL: Well developed, well nourished  · EYES: PERRLA. No xanthelasma, sclera non icteric  · EARS,NOSE,MOUTH,THROAT:  Mucous membranes moist, normal hearing  · NECK: Supple, JVP normal, thyroid not enlarged. Carotids 2+ without bruits  · RESPIRATORY: Normal effort, no rales or rhonchi  · CARDIOVASCULAR: Normal PMI, regular rate and rhythm, no murmurs, rub or gallop. No edema.  Radial pulses present and equal  · CHEST: No scar or masses  · ABDOMEN: Normal bowel sounds. No masses or tenderness. No bruit  · MUSCULOSKELETAL: No clubbing or cyanosis. Moves all extremities well. Normal gait  · SKIN:  Warm and dry. No rashes  · NEUROLOGIC: Cranial nerves intact. Alert and oriented  · PSYCHIATRIC: Calm affect. Appears to have normal judgement and insight    All testing and labs listed below were personally reviewed by myself. Stress 2/16/22  Summary  The overall quality of the study is good. There is subdiaphragmatic  attenuation. The left ventricle is normal in size. The right ventricle is normal in size. SPECT images demonstrate homogeneous tracer distribution throughout the  myocardium. There is normal isotope uptake at stress and rest. There is no  evidence of myocardial ischemia or scar. Gated spect reveals normal  myocardial thickening and wall motion. Sum stress score of 2. No visual TID. Calculated TID is 1.02. Left ventricular ejection fraction is normal at 67%. Myocardial perfusion is normal. Low risk scan. Hypertensive response to  exercise  Recommendation  Follow up with Dr. Lieutenant Crooks about blood pressure management       Cardiac Cath PCI FFR: 11/16/21 Sandra Montgomery  Anatomy:   LM-nml   LAD-nml  Cx-nml  OM- prox 50%  RCA-mid 80% prox 20%  RPDA- nml  LVEF- 60  LVG- nml  LVEDP- 9     LCX FFR 0.96  RCA FFR 0.84  RCA DFR 0.86     Intervention  ~Successful PCI to RCA with 4.0x18 AARON. PD with 4.0x12 NC to 24atm Excellent Result. Contrast: 69  Flouro Time: 7.8  Access: R radial a     Impression  ~Coronary Angiography w/ severe single vessel CAD  ~LVG with LVEF of 60 and no regional wall motion abnormalities  ~Successful complex angioplasty and stenting of RCA     Recommendation  ~Aggressive medical treatment and risk factor modification  ~1. Post cath IVF. Bedrest.   2. Recommend beta blocker, high potency statin, aspirin and plavix for 6-12 months. No ace/arb required given normal LVEF.   3. Referral to outpatient cardiac rehab phase II will be deferred until patient follow-up in office and then determine patient safety and appropriateness to proceed  4. Patient has been advised on the importance of regular exercise of at least 20-30 minutes daily. 5. Patient counseled about and offered assistance for smoking cessation   6. Follow up in 2 weeks with cardiology      Stress 11/1/21  Summary  The overall quality of the study is good. There is subdiaphragmatic  attenuation. The left ventricular cavity size is normal at 92 ml. The right ventricle is  normal in size. SPECT images demonstrate a small area of mildly decreased perfusion at the  apex. The defect is present at rest and stress, consistent with prior  infarct. Sum stress score of 1. No visual TID. Calculated TID is 0.87    Calculated LVEF is normal at 68%. Myocardial perfusion is abnormal. Moderate risk scan. Symptoms with stress. Recommendation  Recommend coronary angiogram to further evaluate. Results relayed to Dr. Nadja Corbin     Echo 9/30/21  Summary  Normal left ventricle size, mild concentric wall thickness and normal systolic function with an estimated ejection fraction of 55-60%. No regional wall motion abnormalities are seen. Normal diastolic function. E/e\"=9.3. Aortic valve appears sclerotic but opens adequately. Trivial pulmonic regurgitation present. Normal RV/RA size. Pulmonic valve size 2.01 cm (limited visualization). Unable to estimate pulmonary artery pressure secondary to absent TR jet envelope    Assessment/Plan  1. Coronary artery disease involving native coronary artery of native heart without angina pectoris    2. Essential hypertension    3.  Mixed hyperlipidemia          Coronary artery disease involving native coronary artery of native heart without angina pectoris  Angina~ none   CCS class~1  Intervention  LHC~ 11/16/21 PCI to RCA  Echo~ 9/30/21 EF 55-60%  Stress~ 11/2021 abnormal -> [facility]  2/2022 low risk scan with hypertensive response to exercise  Current meds~ plavix / asa  Plan~ OK to return to cardiac rehab  DAPT for 12 months    Essential hypertension  /82 (Site: Left Upper Arm, Position: Sitting, Cuff Size: Medium Adult)   Pulse 71   Ht 5' 4\" (1.626 m)   Wt 195 lb 12.8 oz (88.8 kg)   SpO2 98%   BMI 33.61 kg/m²   Meds~ Toprol / lisinopril / hctz   Plan~ stable       Hyperlipidemia  12/2021     LDL  49   HDL 51  Meds~ crestor  Plan~ stable       No orders of the defined types were placed in this encounter. Olu Fleming MD    Follow up in 6 months    Thank you for allowing to me to participate in the care of Hayley Garcia. Scribe's Attestation: This note was scribed in the presence of Dr. Maria G Herrera MD by Prieto Molina RN.       I, Dr. Maria G Herrera, personally performed the services described in this documentation, as scribed by the above signed scribe in my presence. It is both accurate and complete to my knowledge. I agree with the details independently gathered by the clinical support staff, while the remaining scribed note accurately describes my personal service to the patient.

## 2022-03-16 NOTE — ASSESSMENT & PLAN NOTE
/82 (Site: Left Upper Arm, Position: Sitting, Cuff Size: Medium Adult)   Pulse 71   Ht 5' 4\" (1.626 m)   Wt 195 lb 12.8 oz (88.8 kg)   SpO2 98%   BMI 33.61 kg/m²   Meds~ Toprol / lisinopril / hctz   Plan~ stable

## 2022-03-17 ENCOUNTER — TELEMEDICINE (OUTPATIENT)
Dept: PULMONOLOGY | Age: 62
End: 2022-03-17
Payer: COMMERCIAL

## 2022-03-17 DIAGNOSIS — Z99.89 OSA ON CPAP: Primary | Chronic | ICD-10-CM

## 2022-03-17 DIAGNOSIS — I10 ESSENTIAL HYPERTENSION: Chronic | ICD-10-CM

## 2022-03-17 DIAGNOSIS — E66.9 NON MORBID OBESITY, UNSPECIFIED OBESITY TYPE: ICD-10-CM

## 2022-03-17 DIAGNOSIS — G47.33 OSA ON CPAP: Primary | Chronic | ICD-10-CM

## 2022-03-17 DIAGNOSIS — I25.10 CORONARY ARTERY DISEASE INVOLVING NATIVE CORONARY ARTERY OF NATIVE HEART WITHOUT ANGINA PECTORIS: Chronic | ICD-10-CM

## 2022-03-17 PROCEDURE — 99214 OFFICE O/P EST MOD 30 MIN: CPT | Performed by: INTERNAL MEDICINE

## 2022-03-17 ASSESSMENT — SLEEP AND FATIGUE QUESTIONNAIRES
HOW LIKELY ARE YOU TO NOD OFF OR FALL ASLEEP WHILE WATCHING TV: 1
HOW LIKELY ARE YOU TO NOD OFF OR FALL ASLEEP WHILE SITTING AND TALKING TO SOMEONE: 0
HOW LIKELY ARE YOU TO NOD OFF OR FALL ASLEEP WHILE SITTING AND READING: 1
HOW LIKELY ARE YOU TO NOD OFF OR FALL ASLEEP WHILE SITTING INACTIVE IN A PUBLIC PLACE: 0
HOW LIKELY ARE YOU TO NOD OFF OR FALL ASLEEP WHILE LYING DOWN TO REST IN THE AFTERNOON WHEN CIRCUMSTANCES PERMIT: 0
ESS TOTAL SCORE: 2
HOW LIKELY ARE YOU TO NOD OFF OR FALL ASLEEP WHILE SITTING QUIETLY AFTER LUNCH WITHOUT ALCOHOL: 0
HOW LIKELY ARE YOU TO NOD OFF OR FALL ASLEEP IN A CAR, WHILE STOPPED FOR A FEW MINUTES IN TRAFFIC: 0
HOW LIKELY ARE YOU TO NOD OFF OR FALL ASLEEP WHEN YOU ARE A PASSENGER IN A CAR FOR AN HOUR WITHOUT A BREAK: 0

## 2022-03-17 NOTE — LETTER
[facility]  [place] [address] [address] [id number]  Phone: [phone]  Fax: [phone]    Simon Glass MD    March 17, 2022     Vladimir Councilman, [place]  [address] [facility] [address] [facility] [address]    Patient: Marques Bernal   MR Number: [id number]   YOB: 1960   Date of Visit: 3/17/2022       Dear Vladimir Councilman:    Thank you for referring Jocelyn Horner to me for evaluation/treatment. Below are the relevant portions of my assessment and plan of care. 1. ARLENE on CPAP  Assessment & Plan:  New Problem - On Tx. Reviewed sleep study and download compliance data with patient. Supplies and parts as needed for her machine. These are medically necessary. Limit caffeine use after 3pm.      Will do trial pressure increase Pmin-11  Pmax-20 and will adjust ramp. Needs overnight oximetry on APAP (insurance mandated) even though standard of care is to do in-lab titration. 2. Coronary artery disease involving native coronary artery of native heart without angina pectoris  Assessment & Plan:  Chronic- Stable. Discussed the importance of treating sleep apnea as part of the management of this disorder. Cont any meds per PCP and other physicians. 3. Essential hypertension  Assessment & Plan:  Chronic- Stable. Discussed the importance of treating sleep apnea as part of the management of this disorder. Cont any meds per PCP and other physicians. 4. Non morbid obesity, unspecified obesity type  Assessment & Plan:  Chronic-not stable:  Discussed importance of treating obstructive sleep apnea and getting sufficient sleep to assist with weight control. Encouraged her to work on weight loss through diet and exercise. Recommended DASH or Mediterranean diets. Reviewed, analyzed, and documented physiologic data from patient's PAP machine. This information was analyzed to assess complexity and medical decision making in regards to further testing and management.     The primary encounter diagnosis was ARLENE on CPAP. Diagnoses of Coronary artery disease involving native coronary artery of native heart without angina pectoris, Essential hypertension, and Non morbid obesity, unspecified obesity type were also pertinent to this visit. The chronic medical conditions listed are directly related to the primary diagnosis listed above. The management of the primary diagnosis affects the secondary diagnosis and vice versa. If you have questions, please do not hesitate to call me. I look forward to following Andie Bright along with you.     Sincerely,      Amos Layne MD

## 2022-03-17 NOTE — PROGRESS NOTES
Cherylene Roots MD, [facility], [facility]  [facility] CNP [address]  [address] [facility], [address] [phone]   [facility] [address] Kvng Tran. [address] [place] [address]. [address] [phone]     Video Visit- Follow up    Clemencia Loco, was evaluated through a synchronous (real-time) audio-video  encounter. The patient (or guardian if applicable) is aware that this is a billable  service, which includes applicable co-pays. This Virtual Visit was conducted with  patient's (and/or legal guardian's) consent. The visit was conducted pursuant to  the emergency declaration under the [address] [facility], [facility] authority and the [employer] and  Children of the [employer] General Act. Patient identification was verified,  and a caregiver was present when appropriate. The patient was located in a  state where the provider was licensed to provide care. Assessment/Plan:      1. ARLENE on CPAP  Assessment & Plan:  New Problem - On Tx. Reviewed sleep study and download compliance data with patient. Supplies and parts as needed for her machine. These are medically necessary. Limit caffeine use after 3pm.      Will do trial pressure increase Pmin-11  Pmax-20 and will adjust ramp. Needs overnight oximetry on APAP (insurance mandated) even though standard of care is to do in-lab titration. 2. Coronary artery disease involving native coronary artery of native heart without angina pectoris  Assessment & Plan:  Chronic- Stable. Discussed the importance of treating sleep apnea as part of the management of this disorder. Cont any meds per PCP and other physicians. 3. Essential hypertension  Assessment & Plan:  Chronic- Stable. Discussed the importance of treating sleep apnea as part of the management of this disorder. Cont any meds per PCP and other physicians.    4. Non morbid obesity, unspecified obesity type  Assessment & Plan:  Chronic-not stable:  Discussed importance of treating obstructive sleep apnea and getting sufficient sleep to assist with weight control. Encouraged her to work on weight loss through diet and exercise. Recommended DASH or Mediterranean diets. Reviewed, analyzed, and documented physiologic data from patient's PAP machine. This information was analyzed to assess complexity and medical decision making in regards to further testing and management. The primary encounter diagnosis was ARLENE on CPAP. Diagnoses of Coronary artery disease involving native coronary artery of native heart without angina pectoris, Essential hypertension, and Non morbid obesity, unspecified obesity type were also pertinent to this visit. The chronic medical conditions listed are directly related to the primary diagnosis listed above. The management of the primary diagnosis affects the secondary diagnosis and vice versa. Subjective:     Patient ID: Marques Bernal is a 57 y.o. female. Chief Complaint   Patient presents with    Sleep Apnea     Subjective   HPI:    Machine Modem/Download Info:  Compliance (hours/night): 5 hrs/night  % of nights >= 4 hrs: 83 %  Download AHI (/hour): 1.9 /HR  Average CPAP Pressure : 11.5 cmH2O   APAP - Settings  Pressure Min: 6 cmH2O  Pressure Max: 16 cmH2O                 Comfort Settings  Humidity Level (0-8): 4  Heated Tubing (Yes/No): Yes  Flex/EPR (0-3): 3       Had insurance mandated HST on 12/28/21 which showed and TING-20.6/hr and low sat-79% with time below 88% of 89.1 min. Feels much better on her machine, sleeping better waking more rested. The pressures do feel low when she first falls asleep and sometimes in the middle the night. Patient went ahead and bought a machine on her own because of the backlog from equipment companies in getting machines. No HA, dryness, or congestion.      [place] - Total score: 2    Current Outpatient Medications   Medication Instructions    aspirin 81 mg, Oral, DAILY    Cholecalciferol (VITAMIN D) 50 MCG (2000 UT) CAPS capsule 2 capsules, Oral, DAILY    cloNIDine (CATAPRES) 0.1 mg, Oral, DAILY PRN    clopidogrel (PLAVIX) 75 mg, Oral, DAILY    famotidine (PEPCID) 20 mg, Oral, 2 TIMES DAILY    fluticasone-salmeterol (ADVAIR HFA) 230-21 MCG/ACT inhaler 1-2 puffs, Inhalation, 2 TIMES DAILY    hydroCHLOROthiazide (MICROZIDE) 12.5 mg, Oral, EVERY MORNING    Insulin Pen Needle (B-D UF III MINI PEN NEEDLES) 31G X 5 MM MISC 1 each, Does not apply, DAILY    lisinopril (PRINIVIL;ZESTRIL) 20 mg, Oral, 2 TIMES DAILY    metoprolol succinate (TOPROL XL) 12.5 mg, Oral, DAILY    nitroGLYCERIN (NITROSTAT) 0.4 mg, SubLINGual, EVERY 5 MIN PRN    potassium chloride (KLOR-CON M) 10 MEQ extended release tablet 10 mEq, Oral, DAILY    rosuvastatin (CRESTOR) 20 mg, Oral, DAILY    Semaglutide,0.25 or 0.5MG/DOS, (OZEMPIC, 0.25 OR 0.5 MG/DOSE,) 2 MG/1.5ML SOPN 0.25 mg        Electronically signed by Kiko Casas MD on 3/17/2022 at 10:17 AM

## 2022-03-17 NOTE — PROGRESS NOTES
Joseph Turner         : 1960    Diagnosis: [x] Hypoxia (R09.02) [] CSA (G47.31) [] Apnea (G47.30)   Length of Need: [] 13 Months [] 99 Months [] Other:    Machine (BOBBY!): [] Respironics Dream Station      Auto [] ResMed AirSense     Auto [] Other:     []  CPAP () [] Bilevel ()   Mode: [] Auto [] Spontaneous    Mode: [] Auto [] Spontaneous              Comfort Settings:        Humidifier: [] Heated ()        [] Water chamber replacement ()/ 1 per 6 months        Mask:   [] Nasal () /1 per 3 months [] Full Face () /1 per 3 months   [] Patient choice -Size and fit mask [] Patient Choice - Size and fit mask   [] Dispense:  [] Dispense:    [] Headgear () / 1 per 3 months [] Headgear () / 1 per 3 months   [] Replacement Nasal Cushion ()/2 per month [] Interface Replacement ()/1 per month   [] Replacement Nasal Pillows ()/2 per month         Tubing: [] Heated ()/1 per 3 months    [] Standard ()/1 per 3 months [] Other:           Filters: [] Non-disposable ()/1 per 6 months     [] Ultra-Fine, Disposable ()/2 per month        Miscellaneous: [] Chin Strap ()/ 1 per 6 months [] O2 bleed-in:       LPM   [x] Overnight Oximetry on CPAP/Bilevel []  Other:    [] Modem: ()         Start Order Date: 22    MEDICAL JUSTIFICATION:  I, the undersigned, certify that the above prescribed supplies are medically necessary for this patients wellbeing. In my opinion, the supplies are both reasonable and necessary in reference to accepted standards of medicalpractice in treatment of this patients condition.     Damian Stinson MD      NPI: [id number]       Order Signed Date: 22    Electronically signed by Damian Stinson MD on 3/17/2022 at 10:14 AM    Major Dross  1960  [place] [address]  [phone] (home)   [phone] (mobile)      Insurance Info (confirm with patient if correct):  Payor/Plan Subscr  Sex Relation Sub.  Ins. ID Effective Group Num

## 2022-03-18 ENCOUNTER — APPOINTMENT (OUTPATIENT)
Dept: CARDIAC REHAB | Age: 62
End: 2022-03-18
Payer: COMMERCIAL

## 2022-03-18 ENCOUNTER — OFFICE VISIT (OUTPATIENT)
Dept: CARDIOLOGY CLINIC | Age: 62
End: 2022-03-18
Payer: COMMERCIAL

## 2022-03-18 ENCOUNTER — TELEPHONE (OUTPATIENT)
Dept: PULMONOLOGY | Age: 62
End: 2022-03-18

## 2022-03-18 VITALS
DIASTOLIC BLOOD PRESSURE: 82 MMHG | HEIGHT: 64 IN | WEIGHT: 195.8 LBS | OXYGEN SATURATION: 98 % | SYSTOLIC BLOOD PRESSURE: 136 MMHG | BODY MASS INDEX: 33.43 KG/M2 | HEART RATE: 71 BPM

## 2022-03-18 DIAGNOSIS — E78.2 MIXED HYPERLIPIDEMIA: Chronic | ICD-10-CM

## 2022-03-18 DIAGNOSIS — I10 ESSENTIAL HYPERTENSION: Chronic | ICD-10-CM

## 2022-03-18 DIAGNOSIS — I25.10 CORONARY ARTERY DISEASE INVOLVING NATIVE CORONARY ARTERY OF NATIVE HEART WITHOUT ANGINA PECTORIS: Primary | Chronic | ICD-10-CM

## 2022-03-18 PROCEDURE — 99214 OFFICE O/P EST MOD 30 MIN: CPT | Performed by: INTERNAL MEDICINE

## 2022-03-18 RX ORDER — ROSUVASTATIN CALCIUM 5 MG/1
5 TABLET, COATED ORAL DAILY
Qty: 90 TABLET | Refills: 3 | Status: SHIPPED | OUTPATIENT
Start: 2022-03-18

## 2022-03-18 RX ORDER — CARVEDILOL 12.5 MG/1
12.5 TABLET ORAL 2 TIMES DAILY
Qty: 180 TABLET | Refills: 3 | Status: SHIPPED | OUTPATIENT
Start: 2022-03-18 | End: 2022-05-18

## 2022-03-21 ENCOUNTER — APPOINTMENT (OUTPATIENT)
Dept: CARDIAC REHAB | Age: 62
End: 2022-03-21
Payer: COMMERCIAL

## 2022-03-23 ENCOUNTER — HOSPITAL ENCOUNTER (OUTPATIENT)
Dept: CARDIAC REHAB | Age: 62
Setting detail: THERAPIES SERIES
Discharge: HOME | End: 2022-03-23
Payer: COMMERCIAL

## 2022-03-23 PROCEDURE — 93798 PHYS/QHP OP CAR RHAB W/ECG: CPT

## 2022-03-25 ENCOUNTER — HOSPITAL ENCOUNTER (OUTPATIENT)
Dept: CARDIAC REHAB | Age: 62
Setting detail: THERAPIES SERIES
Discharge: HOME | End: 2022-03-25
Payer: COMMERCIAL

## 2022-03-25 PROCEDURE — 93798 PHYS/QHP OP CAR RHAB W/ECG: CPT

## 2022-03-28 ENCOUNTER — HOSPITAL ENCOUNTER (OUTPATIENT)
Dept: CARDIAC REHAB | Age: 62
Setting detail: THERAPIES SERIES
Discharge: HOME | End: 2022-03-28
Payer: COMMERCIAL

## 2022-03-28 PROCEDURE — 93798 PHYS/QHP OP CAR RHAB W/ECG: CPT

## 2022-03-30 ENCOUNTER — HOSPITAL ENCOUNTER (OUTPATIENT)
Dept: CARDIAC REHAB | Age: 62
Setting detail: THERAPIES SERIES
Discharge: HOME | End: 2022-03-30
Payer: COMMERCIAL

## 2022-03-30 PROCEDURE — 93798 PHYS/QHP OP CAR RHAB W/ECG: CPT

## 2022-04-01 ENCOUNTER — HOSPITAL ENCOUNTER (OUTPATIENT)
Dept: CARDIAC REHAB | Age: 62
Setting detail: THERAPIES SERIES
Discharge: HOME | End: 2022-04-01
Payer: COMMERCIAL

## 2022-04-01 PROCEDURE — 93798 PHYS/QHP OP CAR RHAB W/ECG: CPT

## 2022-04-04 ENCOUNTER — HOSPITAL ENCOUNTER (OUTPATIENT)
Dept: CARDIAC REHAB | Age: 62
Setting detail: THERAPIES SERIES
Discharge: HOME | End: 2022-04-04
Payer: COMMERCIAL

## 2022-04-04 PROCEDURE — 93798 PHYS/QHP OP CAR RHAB W/ECG: CPT

## 2022-04-06 ENCOUNTER — HOSPITAL ENCOUNTER (OUTPATIENT)
Dept: CARDIAC REHAB | Age: 62
Setting detail: THERAPIES SERIES
Discharge: HOME | End: 2022-04-06
Payer: COMMERCIAL

## 2022-04-06 PROCEDURE — 93798 PHYS/QHP OP CAR RHAB W/ECG: CPT

## 2022-04-08 ENCOUNTER — HOSPITAL ENCOUNTER (OUTPATIENT)
Dept: CARDIAC REHAB | Age: 62
Setting detail: THERAPIES SERIES
Discharge: HOME | End: 2022-04-08
Payer: COMMERCIAL

## 2022-04-08 PROCEDURE — 93798 PHYS/QHP OP CAR RHAB W/ECG: CPT

## 2022-04-11 ENCOUNTER — HOSPITAL ENCOUNTER (OUTPATIENT)
Dept: CARDIAC REHAB | Age: 62
Setting detail: THERAPIES SERIES
Discharge: HOME | End: 2022-04-11
Payer: COMMERCIAL

## 2022-04-11 PROCEDURE — 93798 PHYS/QHP OP CAR RHAB W/ECG: CPT

## 2022-04-13 ENCOUNTER — HOSPITAL ENCOUNTER (OUTPATIENT)
Dept: CARDIAC REHAB | Age: 62
Setting detail: THERAPIES SERIES
Discharge: HOME | End: 2022-04-13
Payer: COMMERCIAL

## 2022-04-13 PROCEDURE — 93798 PHYS/QHP OP CAR RHAB W/ECG: CPT

## 2022-04-15 ENCOUNTER — HOSPITAL ENCOUNTER (OUTPATIENT)
Dept: CARDIAC REHAB | Age: 62
Setting detail: THERAPIES SERIES
Discharge: HOME | End: 2022-04-15
Payer: COMMERCIAL

## 2022-04-15 PROCEDURE — 93798 PHYS/QHP OP CAR RHAB W/ECG: CPT

## 2022-04-18 ENCOUNTER — HOSPITAL ENCOUNTER (OUTPATIENT)
Dept: CARDIAC REHAB | Age: 62
Setting detail: THERAPIES SERIES
Discharge: HOME | End: 2022-04-18
Payer: COMMERCIAL

## 2022-04-18 PROCEDURE — 93798 PHYS/QHP OP CAR RHAB W/ECG: CPT

## 2022-04-19 ENCOUNTER — OFFICE VISIT (OUTPATIENT)
Dept: ORTHOPEDIC SURGERY | Age: 62
End: 2022-04-19
Payer: COMMERCIAL

## 2022-04-19 VITALS — HEIGHT: 64 IN | WEIGHT: 194.4 LBS | BODY MASS INDEX: 33.19 KG/M2

## 2022-04-19 DIAGNOSIS — M70.21 OLECRANON BURSITIS OF RIGHT ELBOW: Primary | ICD-10-CM

## 2022-04-19 PROCEDURE — 99203 OFFICE O/P NEW LOW 30 MIN: CPT | Performed by: ORTHOPAEDIC SURGERY

## 2022-04-19 NOTE — PROGRESS NOTES
CHIEF COMPLAINT: Right posterior elbow pain/ Olecranon bursitis. HISTORY:  Ms. Mike Hickman 57 y.o.  female right handed presents today for evaluation of right posterior elbow pain which started at the end of February 2022 when she noticed increased bruising and swelling right elbow.  She initially presented to urgent care and was denied care as they thought her injury was too extensive for urgent care. She then presented to [facility] ER on 3/1/2022 where she was x-rayed and instructed to follow-up with orthopedics. She was found to have olecranon bursitis and was instructed that the bruising is from her being on anticoagulant that she is on for cardiac issues. Is complaining of achy pain. She rates her pain a 4/10 VAS. Pain is increase with moving the elbow. Pain is dull achy pain by the end of the day. Mild radiation to the forearm and no numbness and tingling sensation. No other complaint. No h/o trauma or gout. Denies smoking. She states that her swelling and bruising are improving. She cares for her handicapped daughter. Past Medical History:   Diagnosis Date    Arthritis     R KNEE-EVENTUALLY NEEDS RTKR    Breast lump or mass     CAD (coronary artery disease)     Gallstones     Hyperlipidemia     Hypertension     Thyroid disease     MULTINODULAR-6/2020       Past Surgical History:   Procedure Laterality Date    BREAST SURGERY  [id number]    CHOLECYSTECTOMY, LAPAROSCOPIC  02/03/2017    COLONOSCOPY  8.1.2010    CYSTOSCOPY  7.19.2007    HYSTERECTOMY  2007    SKIN BIOPSY      UPPER CHEST MOLE BX-2010    TONSILLECTOMY      AGE 22 YRS.         Social History     Socioeconomic History    Marital status:      Spouse name: Maryann Beal Number of children: 2    Years of education: 12    Highest education level: Not on file   Occupational History    Occupation:      Employer: [employer]   Tobacco Use    Smoking status: Former Smoker Packs/day: 2.00     Years: 27.00     Pack years: 54.00     Types: Cigarettes     Start date: 1/4/1981     Quit date: 1/1/2007     Years since quitting: 15.3    Smokeless tobacco: Never Used   Vaping Use    Vaping Use: Never used   Substance and Sexual Activity    Alcohol use: Never     Alcohol/week: 0.0 standard drinks    Drug use: Never    Sexual activity: Yes     Partners: Male   Other Topics Concern    Not on file   Social History Narrative    Not on file     Social Determinants of Health     Financial Resource Strain: Low Risk     Difficulty of Paying Living Expenses: Not hard at all   Food Insecurity: No Food Insecurity    Worried About Running Out of Food in the Last Year: Never true    Radha of Food in the Last Year: Never true   Transportation Needs: No Transportation Needs    Lack of Transportation (Medical): No    Lack of Transportation (Non-Medical):  No   Physical Activity:     Days of Exercise per Week: Not on file    Minutes of Exercise per Session: Not on file   Stress:     Feeling of Stress : Not on file   Social Connections:     Frequency of Communication with Friends and Family: Not on file    Frequency of Social Gatherings with Friends and Family: Not on file    Attends Shinto Services: Not on file    Active Member of [address] [place] [address] [employer] or Organizations: Not on file    Attends Club or Organization Meetings: Not on file    Marital Status: Not on file   Intimate Partner Violence:     Fear of Current or Ex-Partner: Not on file    Emotionally Abused: Not on file    Physically Abused: Not on file    Sexually Abused: Not on file   Housing Stability:     Unable to Pay for Housing in the Last Year: Not on file    Number of [place] in the Last Year: Not on file    Unstable Housing in the Last Year: Not on file       Family History   Problem Relation Age of Onset    Cancer Mother 52        UTERINE AND LUNG, SMOKER    Cancer Father 64        THROAT CANCER    Heart Disease Father  Cancer Brother 48        THROAT    Heart Disease Other        Current Outpatient Medications on File Prior to Visit   Medication Sig Dispense Refill    carvedilol (COREG) 12.5 MG tablet Take 1 tablet by mouth 2 times daily 180 tablet 3    rosuvastatin (CRESTOR) 5 MG tablet Take 1 tablet by mouth daily 90 tablet 3    Insulin Pen Needle (B-D UF III MINI PEN NEEDLES) 31G X 5 MM MISC 1 each by Does not apply route daily 100 each 6    Semaglutide,0.25 or 0.5MG/DOS, (OZEMPIC, 0.25 OR 0.5 MG/DOSE,) 2 MG/1.5ML SOPN 0.25 mg 1 pen 0    fluticasone-salmeterol (ADVAIR HFA) 230-21 MCG/ACT inhaler Inhale 1-2 puffs into the lungs 2 times daily 1 each 3    lisinopril (PRINIVIL;ZESTRIL) 20 MG tablet Take 1 tablet by mouth 2 times daily 180 tablet 3    hydroCHLOROthiazide (MICROZIDE) 12.5 MG capsule Take 1 capsule by mouth every morning 90 capsule 3    nitroGLYCERIN (NITROSTAT) 0.4 MG SL tablet Place 1 tablet under the tongue every 5 minutes as needed for Chest pain 25 tablet 3    famotidine (PEPCID) 20 MG tablet Take 1 tablet by mouth 2 times daily 60 tablet 3    Cholecalciferol (VITAMIN D) 50 MCG (2000 UT) CAPS capsule Take 2 capsules by mouth daily      clopidogrel (PLAVIX) 75 MG tablet Take 1 tablet by mouth daily 90 tablet 3    potassium chloride (KLOR-CON M) 10 MEQ extended release tablet Take 1 tablet by mouth daily 90 tablet 3    aspirin 81 MG EC tablet Take 81 mg by mouth daily       No current facility-administered medications on file prior to visit. Pertinent items are noted in HPI  Review of systems reviewed from Patient History Form and available in the patient's chart under the Media tab. No change noted. PHYSICAL EXAMINATION:  Ms. Jaymie Butcher is a very pleasant 57 y.o.  female who presents today in no acute distress, awake, alert, and oriented. She is well dressed, nourished and  groomed. Patient with normal affect.   Height is  5' 4\" (1.626 m), weight is 194 lb 6.4 oz (88.2 kg), Body mass index is 33.37 kg/m². Resting respiratory rate is 16. Examination of the gait, showed that the patient walks heel-toe with a non-antalgic gait and no limp.  Examination of both elbows showing a good range of motion. She has intact sensation and good radial pulses bilateral.  She has good hand  strength bilateral, and has mild tenderness on deep palpation over the olecranon bursa with swelling.  The elbows are stable. Biceps reflex 1+ bilaterally. IMAGING: Ashley Arango were reviewed, 2 views of the right elbow taken 3/1/2022, and showed no acute fracture. IMPRESSION: Right olecranon bursitis. PLAN: I discussed with the patient the treatment options including both surgical and non-surgical treatment. We recommended stretching exercises of the forearm and avoid pressure on the olecranon. She is unable to take NSAIDS due to being on anticoagulant. F/u in 6 weeks, PT if needed.        Guy Canchola MD

## 2022-04-20 ENCOUNTER — HOSPITAL ENCOUNTER (OUTPATIENT)
Dept: CARDIAC REHAB | Age: 62
Setting detail: THERAPIES SERIES
Discharge: HOME | End: 2022-04-20
Payer: COMMERCIAL

## 2022-04-20 PROBLEM — M70.21 OLECRANON BURSITIS OF RIGHT ELBOW: Status: ACTIVE | Noted: 2022-04-20

## 2022-04-20 PROCEDURE — 93798 PHYS/QHP OP CAR RHAB W/ECG: CPT

## 2022-04-22 ENCOUNTER — HOSPITAL ENCOUNTER (OUTPATIENT)
Dept: CARDIAC REHAB | Age: 62
Setting detail: THERAPIES SERIES
Discharge: HOME | End: 2022-04-22
Payer: COMMERCIAL

## 2022-04-22 PROCEDURE — 93798 PHYS/QHP OP CAR RHAB W/ECG: CPT

## 2022-04-25 ENCOUNTER — HOSPITAL ENCOUNTER (OUTPATIENT)
Dept: CARDIAC REHAB | Age: 62
Setting detail: THERAPIES SERIES
End: 2022-04-25
Payer: COMMERCIAL

## 2022-04-27 ENCOUNTER — HOSPITAL ENCOUNTER (OUTPATIENT)
Dept: CARDIAC REHAB | Age: 62
Setting detail: THERAPIES SERIES
Discharge: HOME | End: 2022-04-27
Payer: COMMERCIAL

## 2022-04-27 PROCEDURE — 93798 PHYS/QHP OP CAR RHAB W/ECG: CPT

## 2022-04-29 ENCOUNTER — TELEPHONE (OUTPATIENT)
Dept: PULMONOLOGY | Age: 62
End: 2022-04-29

## 2022-04-29 ENCOUNTER — HOSPITAL ENCOUNTER (OUTPATIENT)
Dept: CARDIAC REHAB | Age: 62
Setting detail: THERAPIES SERIES
End: 2022-04-29
Payer: COMMERCIAL

## 2022-04-29 NOTE — TELEPHONE ENCOUNTER
Changed pressure via modem. Spoke with pt.  She is currently with the therapist at [facility] No

## 2022-04-29 NOTE — TELEPHONE ENCOUNTER
Patient is at [facility] getting her new machine. Changes were made to patient's settings per telephone call on 03/18/2022. [facility] can only set up new machine based on old prescriptions. Please remote in to new machine and make same changes that were done on old machine on 03/18/2022. Please call patient when that is done.   [phone]

## 2022-05-05 DIAGNOSIS — E78.2 MIXED HYPERLIPIDEMIA: Primary | ICD-10-CM

## 2022-05-05 DIAGNOSIS — R73.9 ELEVATED BLOOD SUGAR: ICD-10-CM

## 2022-05-11 DIAGNOSIS — E04.1 THYROID NODULE: ICD-10-CM

## 2022-05-11 DIAGNOSIS — E78.2 MIXED HYPERLIPIDEMIA: ICD-10-CM

## 2022-05-11 DIAGNOSIS — R73.9 ELEVATED BLOOD SUGAR: ICD-10-CM

## 2022-05-11 DIAGNOSIS — E04.2 MULTINODULAR GOITER: ICD-10-CM

## 2022-05-11 LAB
A/G RATIO: 1.6 (ref 1.1–2.2)
ALBUMIN SERPL-MCNC: 4.4 G/DL (ref 3.4–5)
ALP BLD-CCNC: 58 U/L (ref 40–129)
ALT SERPL-CCNC: 14 U/L (ref 10–40)
ANION GAP SERPL CALCULATED.3IONS-SCNC: 12 MMOL/L (ref 3–16)
AST SERPL-CCNC: 15 U/L (ref 15–37)
BILIRUB SERPL-MCNC: 0.4 MG/DL (ref 0–1)
BUN BLDV-MCNC: 19 MG/DL (ref 7–20)
CALCIUM SERPL-MCNC: 9.9 MG/DL (ref 8.3–10.6)
CHLORIDE BLD-SCNC: 101 MMOL/L (ref 99–110)
CHOLESTEROL, FASTING: 137 MG/DL (ref 0–199)
CO2: 27 MMOL/L (ref 21–32)
CREAT SERPL-MCNC: 0.6 MG/DL (ref 0.6–1.2)
GFR AFRICAN AMERICAN: >60
GFR NON-AFRICAN AMERICAN: >60
GLUCOSE FASTING: 101 MG/DL (ref 70–99)
HDLC SERPL-MCNC: 54 MG/DL (ref 40–60)
LDL CHOLESTEROL CALCULATED: 71 MG/DL
POTASSIUM SERPL-SCNC: 4.2 MMOL/L (ref 3.5–5.1)
SODIUM BLD-SCNC: 140 MMOL/L (ref 136–145)
T3 TOTAL: 1.67 NG/ML (ref 0.8–2)
T4 FREE: 1.4 NG/DL (ref 0.9–1.8)
TOTAL PROTEIN: 7.1 G/DL (ref 6.4–8.2)
TRIGLYCERIDE, FASTING: 59 MG/DL (ref 0–150)
TSH SERPL DL<=0.05 MIU/L-ACNC: 0.04 UIU/ML (ref 0.27–4.2)
VLDLC SERPL CALC-MCNC: 12 MG/DL

## 2022-05-12 ENCOUNTER — HOSPITAL ENCOUNTER (OUTPATIENT)
Dept: PULMONOLOGY | Age: 62
Discharge: HOME | End: 2022-05-12
Payer: COMMERCIAL

## 2022-05-12 VITALS — HEART RATE: 67 BPM | RESPIRATION RATE: 16 BRPM | OXYGEN SATURATION: 97 %

## 2022-05-12 DIAGNOSIS — Z87.891 SMOKING HISTORY: ICD-10-CM

## 2022-05-12 DIAGNOSIS — R06.02 SOB (SHORTNESS OF BREATH) ON EXERTION: ICD-10-CM

## 2022-05-12 LAB
DLCO %PRED: 80 %
DLCO PRED: NORMAL
DLCO/VA %PRED: NORMAL
DLCO/VA PRED: NORMAL
DLCO/VA: NORMAL
DLCO: NORMAL
ESTIMATED AVERAGE GLUCOSE: 114 MG/DL
EXPIRATORY TIME-POST: NORMAL
EXPIRATORY TIME: NORMAL
FEF 25-75% %CHNG: NORMAL
FEF 25-75% %PRED-POST: NORMAL
FEF 25-75% %PRED-PRE: NORMAL
FEF 25-75% PRED: NORMAL
FEF 25-75%-POST: NORMAL
FEF 25-75%-PRE: NORMAL
FEV1 %PRED-POST: 61 %
FEV1 %PRED-PRE: 49 %
FEV1 PRED: NORMAL
FEV1-POST: NORMAL
FEV1-PRE: NORMAL
FEV1/FVC %PRED-POST: NORMAL
FEV1/FVC %PRED-PRE: NORMAL
FEV1/FVC PRED: NORMAL
FEV1/FVC-POST: 90 %
FEV1/FVC-PRE: 84 %
FVC %PRED-POST: NORMAL
FVC %PRED-PRE: NORMAL
FVC PRED: NORMAL
FVC-POST: NORMAL
FVC-PRE: NORMAL
GAW %PRED: NORMAL
GAW PRED: NORMAL
GAW: NORMAL
HBA1C MFR BLD: 5.6 %
IC %PRED: NORMAL
IC PRED: NORMAL
IC: NORMAL
MEP: NORMAL
MIP: NORMAL
MVV %PRED-PRE: NORMAL
MVV PRED: NORMAL
MVV-PRE: NORMAL
PEF %PRED-POST: NORMAL
PEF %PRED-PRE: NORMAL
PEF PRED: NORMAL
PEF%CHNG: NORMAL
PEF-POST: NORMAL
PEF-PRE: NORMAL
RAW %PRED: NORMAL
RAW PRED: NORMAL
RAW: NORMAL
RV %PRED: NORMAL
RV PRED: NORMAL
RV: NORMAL
SVC %PRED: NORMAL
SVC PRED: NORMAL
SVC: NORMAL
TLC %PRED: 107 %
TLC PRED: NORMAL
TLC: NORMAL
VA %PRED: NORMAL
VA PRED: NORMAL
VA: NORMAL
VTG %PRED: NORMAL
VTG PRED: NORMAL
VTG: NORMAL

## 2022-05-12 PROCEDURE — 94060 EVALUATION OF WHEEZING: CPT

## 2022-05-12 PROCEDURE — 94200 LUNG FUNCTION TEST (MBC/MVV): CPT

## 2022-05-12 PROCEDURE — 94726 PLETHYSMOGRAPHY LUNG VOLUMES: CPT

## 2022-05-12 PROCEDURE — 94760 N-INVAS EAR/PLS OXIMETRY 1: CPT

## 2022-05-12 PROCEDURE — 94729 DIFFUSING CAPACITY: CPT

## 2022-05-12 PROCEDURE — 6370000000 HC RX 637 (ALT 250 FOR IP): Performed by: FAMILY MEDICINE

## 2022-05-12 RX ORDER — ALBUTEROL SULFATE 90 UG/1
4 AEROSOL, METERED RESPIRATORY (INHALATION) ONCE
Status: COMPLETED | OUTPATIENT
Start: 2022-05-12 | End: 2022-05-12

## 2022-05-12 RX ADMIN — Medication 4 PUFF: at 11:09

## 2022-05-12 ASSESSMENT — PULMONARY FUNCTION TESTS
FEV1_PERCENT_PREDICTED_POST: 61
FEV1/FVC_PRE: 84
FEV1_PERCENT_PREDICTED_PRE: 49
FEV1/FVC_POST: 90

## 2022-05-13 NOTE — PROCEDURES
Pulmonary Function Testing      Patient name:  Dean Rea     [facility] Unit #:   [id number]   Date of test:  5/12/2022  Date of interpretation:   5/13/2022    Ms. Dean Rea is a 57y.o. year-old former smoker. The spirometry data were acceptable and reproducible. Spirometry:  Flow volume loops were obstructed. The FEV-1/FVC ratio was decreased. The FEV-1 was 1.24 liters (49% of predicted), which was severely decreased. The FVC was 1.91 liters (58% of predicted), which was decreased. Response to inhaled bronchodilators (albuterol) was significant. Lung volumes:  Lung volumes were tested by plethysmography. The total lung capacity was 5.21 liters (107% of predicted), which was normal. The residual volume was 2.99 liters (161% of predicted), which was increased. The ratio of residual volume to total lung capacity (RV/TLC) was 57, which was increased. Diffusion capacity was found to be 80% which is Normal.      Interpretation:  Severe obstruction with significant bronchodilator reversibility noted.      Comments:

## 2022-05-17 NOTE — PROGRESS NOTES
Pierre Bourne is a 57 y.o. female. HPI:  Here for blood pressure check, health maintenance also reviewed    PFTs reviewed, severe obstructive airway disease, no cough  Trial of Anoro  Advair does help  CPAP working well, continue    Taking coreg once a day 12.5 mg , heart rate in the 46s  Was afraid to take Coreg 12.5 mg twice a day    Meds, vitamins and allergies reviewed with pt    Weight loss noted  Wt Readings from Last 3 Encounters:   05/18/22 190 lb (86.2 kg)   04/19/22 194 lb 6.4 oz (88.2 kg)   03/18/22 195 lb 12.8 oz (88.8 kg)       REVIEW OF SYSTEMS:   CONSTITUTIONAL: See history of present illness,   Weight loss noted   HEENT: No new vision difficulties or ringing in the ears. RESPIRATORY: No new SOB, PND, orthopnea or cough. CARDIOVASCULAR: no CP, palpitations or SOB with exertion  GI: No nausea, vomiting, diarrhea, constipation, abdominal pain or changes in bowel habits. : No urinary frequency, urgency, incontinence hematuria or dysuria. SKIN: No cyanosis or skin lesions. MUSCULOSKELETAL: No new muscle or joint pain. NEUROLOGICAL: No syncope or TIA-like symptoms. PSYCHIATRIC: No anxiety, insomnia or depression     Allergies   Allergen Reactions    Penicillins Anaphylaxis    Levaquin [Levofloxacin In D5w] Other (See Comments)     Joint pain    Morphine Rash       Prior to Visit Medications    Medication Sig Taking?  Authorizing Provider   carvedilol (COREG) 6.25 MG tablet Take 1 tablet by mouth 2 times daily Yes Brice Salguero MD   Semaglutide,0.25 or 0.5MG/DOS, (OZEMPIC, 0.25 OR 0.5 MG/DOSE,) 2 MG/1.5ML SOPN 0.25 mg Yes Brice Salguero MD   umeclidinium-vilanterol (ANORO ELLIPTA) 62.5-25 MCG/INH AEPB inhaler Inhale 1 puff into the lungs daily Yes Brice Salguero MD   rosuvastatin (CRESTOR) 5 MG tablet Take 1 tablet by mouth daily Yes Blaine Veloz MD   Insulin Pen Needle (B-D UF III MINI PEN NEEDLES) 31G X 5 MM MISC 1 each by Does not apply route daily Yes Keegan Hart MD fluticasone-salmeterol (ADVAIR HFA) 230-21 MCG/ACT inhaler Inhale 1-2 puffs into the lungs 2 times daily Yes Afsaneh Tovar MD   lisinopril (PRINIVIL;ZESTRIL) 20 MG tablet Take 1 tablet by mouth 2 times daily Yes Afsaneh Tovar MD   hydroCHLOROthiazide (MICROZIDE) 12.5 MG capsule Take 1 capsule by mouth every morning Yes Afsaneh Tovar MD   nitroGLYCERIN (NITROSTAT) 0.4 MG SL tablet Place 1 tablet under the tongue every 5 minutes as needed for Chest pain Yes Montrell Zimmerman MD   famotidine (PEPCID) 20 MG tablet Take 1 tablet by mouth 2 times daily Yes Afsaneh Tovar MD   Cholecalciferol (VITAMIN D) 50 MCG (2000 UT) CAPS capsule Take 2 capsules by mouth daily Yes Historical Provider, MD   clopidogrel (PLAVIX) 75 MG tablet Take 1 tablet by mouth daily Yes Montrell Zimmerman MD   potassium chloride (KLOR-CON M) 10 MEQ extended release tablet Take 1 tablet by mouth daily Yes Afsaneh Tovar MD   aspirin 81 MG EC tablet Take 81 mg by mouth daily Yes Historical Provider, MD       Past Medical History:   Diagnosis Date    Arthritis     R KNEE-EVENTUALLY NEEDS RTKR    Breast lump or mass     CAD (coronary artery disease)     Gallstones     Hyperlipidemia     Hypertension     Thyroid disease     MULTINODULAR-6/2020       Social History     Tobacco Use    Smoking status: Former Smoker     Packs/day: 2.00     Years: 27.00     Pack years: 54.00     Types: Cigarettes     Start date: 1/4/1981     Quit date: 1/1/2007     Years since quitting: 15.3    Smokeless tobacco: Never Used   Substance Use Topics    Alcohol use: Never     Alcohol/week: 0.0 standard drinks       Family History   Problem Relation Age of Onset    Cancer Mother 52        UTERINE AND LUNG, SMOKER    Cancer Father 64        THROAT CANCER    Heart Disease Father     Cancer Brother 48        THROAT    Heart Disease Other        OBJECTIVE:  /70 (Site: Left Upper Arm, Position: Sitting, Cuff Size: Medium Adult)   Pulse 58   Resp 16   Ht 5' 4\" (1.626 m)   Wt 190 lb (86.2 kg)   SpO2 97%   Breastfeeding No   BMI 32.61 kg/m²   GEN:  in NAD, overall doing well  HEENT:  NCAT, TMs:normal and throat: Not examined/no complaint  NECK:  Supple without adenopathy. CV:  Regular rate and rhythm, S1 and S2 normal, no murmurs, clicks  PULM:  Chest is clear, no wheezing ,  symmetric air entry throughout both lung fields. ABD: Soft, NT, BMI noted, no masses  EXT: No rash or edema  NEURO: Alert oriented ×3, nonfocal no assistive device    ASSESSMENT/PLAN:  1. Essential hypertension  Stable, decrease Coreg to 6.25 mg twice daily    2. Coronary artery disease involving native coronary artery of native heart without angina pectoris  Stable continue medicine, on aspirin, statin, Plavix, Coreg twice daily    3. ARLENE on CPAP  Doing well, helping, continue CPAP nightly    4. Mixed hyperlipidemia  Stable continue Crestor 5 mg at bedtime    5. Encounter to discuss test results  Reviewed with patient in detail    6. Obesity, unspecified classification, unspecified obesity type, unspecified whether serious comorbidity present  Seeing endocrinology, continue  - Semaglutide,0.25 or 0.5MG/DOS, (OZEMPIC, 0.25 OR 0.5 MG/DOSE,) 2 MG/1.5ML SOPN; 0.25 mg  Dispense: 1 pen; Refill: 0    7. Multinodular goiter  Seeing Endo, continue  Endo recommending  getting thyroidectomy    8. COVID-19 virus infection  January 2022, does not wish to get booster    9.  Obstructive airway disease ([facility] [address]  Trial of  Advair presently, trial of Anoro daily so she can take an inhaler daily rather than twice a day      30 Total Minutes spent pre charting (reviewing problem list, meds, any test results, consultant and hospital notes ) and  obtaining present visit history, performing appropriate medical exam/evaluation, counseling and educating the patient (and family), ordering medications ,tests, and procedures as needed, refilling medication(s), placing referral(s) when needed in addition to coordinating care for this patient and documenting in electronic health record

## 2022-05-18 ENCOUNTER — OFFICE VISIT (OUTPATIENT)
Dept: FAMILY MEDICINE CLINIC | Age: 62
End: 2022-05-18
Payer: COMMERCIAL

## 2022-05-18 VITALS
OXYGEN SATURATION: 97 % | RESPIRATION RATE: 16 BRPM | HEIGHT: 64 IN | SYSTOLIC BLOOD PRESSURE: 126 MMHG | HEART RATE: 58 BPM | WEIGHT: 190 LBS | BODY MASS INDEX: 32.44 KG/M2 | DIASTOLIC BLOOD PRESSURE: 70 MMHG

## 2022-05-18 DIAGNOSIS — U07.1 COVID-19 VIRUS INFECTION: ICD-10-CM

## 2022-05-18 DIAGNOSIS — J44.9 OBSTRUCTIVE AIRWAY DISEASE (HCC): ICD-10-CM

## 2022-05-18 DIAGNOSIS — G47.33 OSA ON CPAP: Chronic | ICD-10-CM

## 2022-05-18 DIAGNOSIS — E66.9 OBESITY, UNSPECIFIED CLASSIFICATION, UNSPECIFIED OBESITY TYPE, UNSPECIFIED WHETHER SERIOUS COMORBIDITY PRESENT: ICD-10-CM

## 2022-05-18 DIAGNOSIS — E04.2 MULTINODULAR GOITER: ICD-10-CM

## 2022-05-18 DIAGNOSIS — I25.10 CORONARY ARTERY DISEASE INVOLVING NATIVE CORONARY ARTERY OF NATIVE HEART WITHOUT ANGINA PECTORIS: Chronic | ICD-10-CM

## 2022-05-18 DIAGNOSIS — E78.2 MIXED HYPERLIPIDEMIA: Chronic | ICD-10-CM

## 2022-05-18 DIAGNOSIS — Z99.89 OSA ON CPAP: Chronic | ICD-10-CM

## 2022-05-18 DIAGNOSIS — I10 ESSENTIAL HYPERTENSION: Primary | Chronic | ICD-10-CM

## 2022-05-18 DIAGNOSIS — Z71.2 ENCOUNTER TO DISCUSS TEST RESULTS: ICD-10-CM

## 2022-05-18 PROBLEM — K80.20 CALCULUS OF GALLBLADDER WITHOUT CHOLECYSTITIS: Status: RESOLVED | Noted: 2017-01-23 | Resolved: 2022-05-18

## 2022-05-18 PROBLEM — R07.9 CHEST PAIN: Status: RESOLVED | Noted: 2021-11-17 | Resolved: 2022-05-18

## 2022-05-18 PROCEDURE — 99214 OFFICE O/P EST MOD 30 MIN: CPT | Performed by: FAMILY MEDICINE

## 2022-05-18 RX ORDER — SEMAGLUTIDE 1.34 MG/ML
INJECTION, SOLUTION SUBCUTANEOUS
Qty: 1 PEN | Refills: 0 | Status: SHIPPED | OUTPATIENT
Start: 2022-05-18 | End: 2022-07-01 | Stop reason: SDUPTHER

## 2022-05-18 RX ORDER — CARVEDILOL 6.25 MG/1
6.25 TABLET ORAL 2 TIMES DAILY
Qty: 60 TABLET | Refills: 2 | Status: SHIPPED | OUTPATIENT
Start: 2022-05-18 | End: 2022-08-11 | Stop reason: SDUPTHER

## 2022-05-28 ENCOUNTER — HOSPITAL ENCOUNTER (OUTPATIENT)
Dept: ULTRASOUND IMAGING | Age: 62
Discharge: HOME | End: 2022-05-28
Payer: COMMERCIAL

## 2022-05-28 DIAGNOSIS — E04.2 MULTINODULAR GOITER: ICD-10-CM

## 2022-05-28 PROCEDURE — 76536 US EXAM OF HEAD AND NECK: CPT

## 2022-06-01 ENCOUNTER — OFFICE VISIT (OUTPATIENT)
Dept: ENDOCRINOLOGY | Age: 62
End: 2022-06-01
Payer: COMMERCIAL

## 2022-06-01 VITALS
RESPIRATION RATE: 14 BRPM | TEMPERATURE: 98 F | HEIGHT: 64 IN | HEART RATE: 64 BPM | SYSTOLIC BLOOD PRESSURE: 103 MMHG | BODY MASS INDEX: 32.1 KG/M2 | DIASTOLIC BLOOD PRESSURE: 60 MMHG | WEIGHT: 188 LBS

## 2022-06-01 DIAGNOSIS — E04.2 MULTINODULAR GOITER: Primary | ICD-10-CM

## 2022-06-01 PROCEDURE — 99214 OFFICE O/P EST MOD 30 MIN: CPT | Performed by: INTERNAL MEDICINE

## 2022-06-01 RX ORDER — METHIMAZOLE 5 MG/1
5 TABLET ORAL DAILY
Qty: 30 TABLET | Refills: 4 | Status: ON HOLD | OUTPATIENT
Start: 2022-06-01 | End: 2022-06-23 | Stop reason: HOSPADM

## 2022-06-01 NOTE — PROGRESS NOTES
Marj Rojas is a 57 y.o. female who is here for management  Of obesity, prediabetes and thyroid nodule. Patient has a PMH of HTN, breast lump, hyperlipidemia. Patient had Thyroid US done in  06/20 which showed nodules   In right lobe , dominant nodule measures 1.2 cm solid . In left lobe , dominant nodule measures 2.8 cm in mid pole and 1.8 cm in lower pole. Occasional difficulty swallowing. Sister has h/o multinodular goiter and had recent surgery. There was no thyroid cancer diagnosed in the family  No FH of thyroid cancer  No History of exposure to radiation as a child. She has strong FH of DM  She has tried different diets . Unable to stay on diet long term. 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 She had Covid in jan 2022 which was mild       INTERIM    she overall feels better since started Ozempic. She did not realize but she has lost approximately 12 pounds in the last few months. Denies any issues with swallowing or choking.   Does complain of palpitations          Allergies   Allergen Reactions    Penicillins Anaphylaxis    Levaquin [Levofloxacin In D5w] Other (See Comments)     Joint pain    Morphine Rash     Outpatient Medications Marked as Taking for the 6/1/22 encounter (Office Visit) with Maryana Luevano MD   Medication Sig Dispense Refill    methIMAzole (TAPAZOLE) 5 MG tablet Take 1 tablet by mouth daily 30 tablet 4    carvedilol (COREG) 6.25 MG tablet Take 1 tablet by mouth 2 times daily 60 tablet 2    Semaglutide,0.25 or 0.5MG/DOS, (OZEMPIC, 0.25 OR 0.5 MG/DOSE,) 2 MG/1.5ML SOPN 0.25 mg 1 pen 0    umeclidinium-vilanterol (ANORO ELLIPTA) 62.5-25 MCG/INH AEPB inhaler Inhale 1 puff into the lungs daily 1 each 2    rosuvastatin (CRESTOR) 5 MG tablet Take 1 tablet by mouth daily 90 tablet 3    Insulin Pen Needle (B-D UF III MINI PEN NEEDLES) 31G X 5 MM MISC 1 each by Does not apply route daily 100 each 6    lisinopril (PRINIVIL;ZESTRIL) 20 MG tablet Take 1 tablet by mouth 2 times daily 180 tablet 3    hydroCHLOROthiazide (MICROZIDE) 12.5 MG capsule Take 1 capsule by mouth every morning 90 capsule 3    nitroGLYCERIN (NITROSTAT) 0.4 MG SL tablet Place 1 tablet under the tongue every 5 minutes as needed for Chest pain 25 tablet 3    famotidine (PEPCID) 20 MG tablet Take 1 tablet by mouth 2 times daily 60 tablet 3    Cholecalciferol (VITAMIN D) 50 MCG (2000 UT) CAPS capsule Take 2 capsules by mouth daily      clopidogrel (PLAVIX) 75 MG tablet Take 1 tablet by mouth daily 90 tablet 3    potassium chloride (KLOR-CON M) 10 MEQ extended release tablet Take 1 tablet by mouth daily 90 tablet 3    aspirin 81 MG EC tablet Take 81 mg by mouth daily           Vitals:    06/01/22 1023   BP: 103/60   Pulse: 64   Resp: 14   Temp: 98 °F (36.7 °C)   Weight: 188 lb (85.3 kg)   Height: 5' 4\" (1.626 m)       Past Medical History:   Diagnosis Date    Arthritis     R KNEE-EVENTUALLY NEEDS RTKR    Breast lump or mass     CAD (coronary artery disease)     Gallstones     Hyperlipidemia     Hypertension     Thyroid disease     MULTINODULAR-6/2020     Past Surgical History:   Procedure Laterality Date    BREAST SURGERY  [id number]    CHOLECYSTECTOMY, LAPAROSCOPIC  02/03/2017    COLONOSCOPY  8.1.2010    CYSTOSCOPY  7.19.2007    HYSTERECTOMY  2007    SKIN BIOPSY      UPPER CHEST MOLE BX-2010    TONSILLECTOMY      AGE 22 YRS.       Family History   Problem Relation Age of Onset    Cancer Mother 52        UTERINE AND LUNG, SMOKER    Cancer Father 64        THROAT CANCER    Heart Disease Father     Cancer Brother 48        THROAT    Heart Disease Other      Social History     Tobacco Use   Smoking Status Former Smoker    Packs/day: 2.00    Years: 27.00    Pack years: 54.00    Types: Cigarettes    Start date: 1/4/1981   [facility] Quit date: 1/1/2007    Years since quitting: 15.4   Smokeless Tobacco Never Used      Social History     Substance and Sexual Activity   Alcohol Use Never    Alcohol/week: 0.0 standard drinks         ROS  I have reviewed the review of system questionnaire filled by the patient . Patient was advised to contact PCP for non endocrine signs and symptoms     EXAM   Constitutional: no acute distress, well appearing, well nourished  Psychiatric: oriented to person, place and time, judgement, insight and normal, recent and remote memory and intact and mood, affect are normal  Skin: skin and subcutaneous tissue is normal without mass,   Head and Face: examination of head and face revealed no abnormalities  Eyes: no lid or conjunctival swelling, no erythema or discharge, pupils are normal,   Ears/Nose: external inspection of ears and nose revealed no abnormalities, hearing is grossly normal  Oropharynx/Mouth/Face: lips, tongue and gums are normal with no lesions, the voice quality was normal  Neck: neck is supple and symmetric, with midline trachea and no masses, thyroid is enlarged    Pulmonary: no increased work of breathing or signs of respiratory distress, lungs are clear to auscultation  Cardiovascular: normal heart rate and rhythm, normal S1 and S2,   Musculoskeletal: normal gait and station,   Neurological: normal coordination, normal general cortical function    Lab Results   Component Value Date    TSHFT4 0.88 12/05/2016    TSH 0.04 (L) 05/11/2022       Assessment/Plan    ---- TOXIC MULTINODULAR GOITER   She has  subclinical hyperthyroidism  with most recent TSH value of 0.04 in May 2022. We will start her on low-dose of Tapazole 5 mg daily.   Patient was given a written handout on all possible side effects from Tapazole    TSH 0.76 in 06/20>>0.12 in July 2021m  Sister had hemithyroidectomy no hx of thyroid cancer   Today we  discussed the thyroid ultrasound done in may 2022 since the radiologist was recommending fine-needle aspiration biopsy again which she previously had years ago. We decided to proceed with surgical options  Refer to ENT, referral placed    ----   previously FNA done at [facility].  8/2020 no malignant cells identified       --Obesity with pre-diabetes . Discussed diet changes. Aic 5.9   Follows with her primary care physician. Started  Ozempic 0.25 mg weekly she feels it helps   Increase to 0.5 milligrams weekly she will call us to get refills for the 1 mg weekly dose if she is able to tolerate the 0.5 without any side effects  She has lost 12 lbs since March 2022   All possible Side effects were discussed in detail with patient in detail and patient was advised to call our office if she  notes any side effects shewas given are written handout detailing side effects from the medication. ---Hyperlipidemia  On crestor 5 mg daily. Managed by PCP.     ---- HTN   Control stable     ---CAD s/p stent in Nov 2021   Follows with cardiology. Still on blood thinners   She has premature heart disease in her family. Return in about 3 months (around 9/1/2022).

## 2022-06-01 NOTE — PATIENT INSTRUCTIONS
Patient Education        methimazole  Pronunciation: moses knott zole  Brand: Tapazole  What is the most important information I should know about methimazole? You should not breast-feed while using this medicine. What is methimazole? Methimazole prevents the thyroid gland from producing too much thyroid hormone. Methimazole is used to treat hyperthyroidism (overactive thyroid). It is alsoused before thyroid surgery or radioactive iodine treatment. Methimazole may also be used for purposes not listed in this medication guide. What should I discuss with my healthcare provider before taking methimazole? You should not use methimazole if you are allergic to it, or:   if you are pregnant or breast-feeding. To make sure methimazole is safe for you, tell your doctor if you have:   liver disease;   a blood cell disorder; or   a weak immune system. Using methimazole during pregnancy could harm the unborn baby. Tell your doctorif you are pregnant or if you become pregnant while using this medicine. Methimazole can pass into breast milk and may harm a nursing baby. You should not breast-feed while using this medicine. Do not give this medicine to a child without medical advice. How should I take methimazole? Follow all directions on your prescription label. Your doctor may occasionally change your dose. Do not use this medicine in larger or smaller amounts or forlonger than recommended. Methimazole is usually taken every 8 hours. Take your doses at regularintervals to keep a steady amount of the drug in your body at all times. If a child is using this medicine,  tell your doctor if the child has any changes in weight. Methimazole doses arebased on weight in children, and any changes may affect your child's dose. Methimazole can lower blood cells that help your body fight infections and help your blood to clot.  This can make it easier for you to bleed from an injury or get sick from being around others who are ill. Your blood may need to be testedoften. Use methimazole regularly to get the most benefit, even if you feel fine or have no symptoms of hyperthyroidism. Get your prescription refilled before yourun out of medicine completely. If you need surgery, tell the surgeon ahead of time that you are using methimazole. Store at room temperature away from moisture and heat. What happens if I miss a dose? Take the missed dose as soon as you remember. Skip the missed dose if it is almost time for your next scheduled dose. Do not take extra medicine to make up the missed dose. What happens if I overdose? Seek emergency medical attention or call the Poison Help line at 1-780.954.6977. Overdose symptoms may include nausea, vomiting, upset stomach, headache, jointpain, fever, itching, swelling, or pale skin and easy bruising or bleeding. What should I avoid while taking methimazole? Avoid being near people who are sick or have infections. Tell your doctor atonce if you develop signs of infection. What are the possible side effects of methimazole? Get emergency medical help if you have signs of an allergic reaction: hives; difficult breathing; swelling of your face, lips, tongue, or throat. Serious and sometimes fatal infections may occur during treatment with methimazole. Stop using this medicine and call your doctor right away if you have signs of infection such as:   sudden weakness or ill feeling, fever, chills, sore throat, cold or flu symptoms;   painful mouth sores, pain when swallowing, red or swollen gums; or   pale skin, easy bruising, unusual bleeding. Call your doctor at once if you have:   swollen glands in your neck or jaw; or   liver problems --nausea, upper stomach pain, itching, tiredness, loss of appetite, dark urine, jack-colored stools, jaundice (yellowing of the skin or eyes).   Common side effects may include:   nausea, vomiting, upset stomach;   headache, dizziness, drowsiness;   numbness or tingly feeling;   rash, itching, skin discoloration;   muscle or joint pain;   hair loss; or   decreased sense of taste. This is not a complete list of side effects and others may occur. Call your doctor for medical advice about side effects. You may report side effects toFDA at 6-638-QXH-7704. What other drugs will affect methimazole? Tell your doctor about all your current medicines and any you start or stopusing, especially:   digoxin, digitalis, theophylline;   a blood thinner --warfarin, Coumadin, Jantoven; or   a beta blocker --atenolol, carvedilol, labetalol, metoprolol, nadolol, propranolol, sotalol, and others. This list is not complete. Other drugs may interact with methimazole, including prescription and over-the-counter medicines, vitamins, and herbal products. Notall possible interactions are listed in this medication guide. Where can I get more information? Your pharmacist can provide more information about methimazole. Remember, keep this and all other medicines out of the reach of children, never share your medicines with others, and use this medication only for the indication prescribed. Every effort has been made to ensure that the information provided by [address] is accurate, up-to-date, and complete, but no guarantee is made to that effect. Drug information contained herein may be time sensitive. [employer] information has been compiled for use by healthcare practitioners and consumers in the [place] and therefore [employer] does not warrant that uses outside of the [place] are appropriate, unless specifically indicated otherwise. [employer] drug information does not endorse drugs, diagnose patients or recommend therapy.  [employer] drug information is an informational resource designed to assist licensed healthcare practitioners in caring for their patients and/or to serve consumers viewing this service as a supplement to, and not a substitute for, the expertise, skill, knowledge and judgment of healthcare practitioners. The absence of a warning for a given drug or drug combination in no way should be construed to indicate that the drug or drug combination is safe, effective or appropriate for any given patient. [facility] does not assume any responsibility for any aspect of healthcare administered with the aid of information [facility] provides. The information contained herein is not intended to cover all possible uses, directions, precautions, warnings, drug interactions, allergic reactions, or adverse effects. If you have questions about the drugs you are taking, check with yourdoctor, nurse or pharmacist.  Copyright 4633-7844 [facility] [place]. Version: 4.01. Revision date: 3/2/2017. Care instructions adapted under license by [facility] ([facility]). If you have questions about a medical condition or this instruction, always ask your healthcare professional. [place] [address] any warranty or liability for your use of this information.

## 2022-06-14 ENCOUNTER — OFFICE VISIT (OUTPATIENT)
Dept: PULMONOLOGY | Age: 62
End: 2022-06-14
Payer: COMMERCIAL

## 2022-06-14 VITALS
HEIGHT: 64 IN | OXYGEN SATURATION: 97 % | HEART RATE: 60 BPM | SYSTOLIC BLOOD PRESSURE: 124 MMHG | DIASTOLIC BLOOD PRESSURE: 66 MMHG | BODY MASS INDEX: 32.1 KG/M2 | WEIGHT: 188 LBS

## 2022-06-14 DIAGNOSIS — G47.33 OSA ON CPAP: Chronic | ICD-10-CM

## 2022-06-14 DIAGNOSIS — I25.10 CORONARY ARTERY DISEASE INVOLVING NATIVE CORONARY ARTERY OF NATIVE HEART WITHOUT ANGINA PECTORIS: Chronic | ICD-10-CM

## 2022-06-14 DIAGNOSIS — E66.9 NON MORBID OBESITY, UNSPECIFIED OBESITY TYPE: Chronic | ICD-10-CM

## 2022-06-14 DIAGNOSIS — I10 ESSENTIAL HYPERTENSION: Chronic | ICD-10-CM

## 2022-06-14 DIAGNOSIS — Z99.89 OSA ON CPAP: Chronic | ICD-10-CM

## 2022-06-14 PROCEDURE — 99214 OFFICE O/P EST MOD 30 MIN: CPT | Performed by: INTERNAL MEDICINE

## 2022-06-14 ASSESSMENT — SLEEP AND FATIGUE QUESTIONNAIRES
HOW LIKELY ARE YOU TO NOD OFF OR FALL ASLEEP WHILE SITTING QUIETLY AFTER LUNCH WITHOUT ALCOHOL: 0
HOW LIKELY ARE YOU TO NOD OFF OR FALL ASLEEP WHILE WATCHING TV: 0
HOW LIKELY ARE YOU TO NOD OFF OR FALL ASLEEP WHEN YOU ARE A PASSENGER IN A CAR FOR AN HOUR WITHOUT A BREAK: 0
HOW LIKELY ARE YOU TO NOD OFF OR FALL ASLEEP WHILE LYING DOWN TO REST IN THE AFTERNOON WHEN CIRCUMSTANCES PERMIT: 0
HOW LIKELY ARE YOU TO NOD OFF OR FALL ASLEEP WHILE SITTING AND READING: 0
HOW LIKELY ARE YOU TO NOD OFF OR FALL ASLEEP IN A CAR, WHILE STOPPED FOR A FEW MINUTES IN TRAFFIC: 0
HOW LIKELY ARE YOU TO NOD OFF OR FALL ASLEEP WHILE SITTING INACTIVE IN A PUBLIC PLACE: 0
HOW LIKELY ARE YOU TO NOD OFF OR FALL ASLEEP WHILE SITTING AND TALKING TO SOMEONE: 0
ESS TOTAL SCORE: 0

## 2022-06-14 NOTE — LETTER
[facility]  [id number]  [address] [place] [address] [place]  Phone: [phone]  Fax: [phone]    Peri Pérez MD    June 14, 2022     Bedford Cooks, MD  [address] [address]    Patient: Rommel Coyle   MR Number: [id number]   YOB: 1960   Date of Visit: 6/14/2022       Dear Bedford Cooks:    Thank you for referring Betty Feliciano to me for evaluation/treatment. Below are the relevant portions of my assessment and plan of care. 1. ARLENE on CPAP  Assessment & Plan:  Chronic-Stable: Reviewed and analyzed results of physiologic download from patient's machine and reviewed with patient. Supplies and parts as needed for her machine. These are medically necessary. Limit caffeine use after 3pm. Based on the analyzed data will continue with current settings    2. Coronary artery disease involving native coronary artery of native heart without angina pectoris  Assessment & Plan:  Chronic- Stable. Discussed the importance of treating sleep apnea as part of the management of this disorder. Cont any meds per PCP and other physicians. 3. Essential hypertension  Assessment & Plan:  Chronic- Stable. Discussed the importance of treating sleep apnea as part of the management of this disorder. Cont any meds per PCP and other physicians. 4. Non morbid obesity, unspecified obesity type  Assessment & Plan:  Chronic-not stable:  Discussed importance of treating obstructive sleep apnea and getting sufficient sleep to assist with weight control. Encouraged her to work on weight loss through diet and exercise. Recommended DASH or Mediterranean diets. Reviewed, analyzed, and documented physiologic data from patient's PAP machine. This information was analyzed to assess complexity and medical decision making in regards to further testing and management.     Diagnoses of ARLENE on CPAP, Coronary artery disease involving native coronary artery of native heart without angina pectoris, Essential hypertension, and Non morbid obesity, unspecified obesity type were pertinent to this visit. The chronic medical conditions listed are directly related to the primary diagnosis listed above. The management of the primary diagnosis affects the secondary diagnosis and vice versa. If you have questions, please do not hesitate to call me. I look forward to following Manisha Patrick along with you.     Sincerely,      Jorje Chan MD

## 2022-06-14 NOTE — PROGRESS NOTES
[facility]  [id number] [place] [address], [address] [facility]- [phone]   [facility] Severo Phillips. [address] [place] [address]. Nhung Warner 37 [phone]     [address] [facility]  [address] [place] [address]  [phone]      Assessment/Plan:      1. ARLENE on CPAP  Assessment & Plan:  Chronic-Stable: Reviewed and analyzed results of physiologic download from patient's machine and reviewed with patient. Supplies and parts as needed for her machine. These are medically necessary. Limit caffeine use after 3pm. Based on the analyzed data will continue with current settings    2. Coronary artery disease involving native coronary artery of native heart without angina pectoris  Assessment & Plan:  Chronic- Stable. Discussed the importance of treating sleep apnea as part of the management of this disorder. Cont any meds per PCP and other physicians. 3. Essential hypertension  Assessment & Plan:  Chronic- Stable. Discussed the importance of treating sleep apnea as part of the management of this disorder. Cont any meds per PCP and other physicians. 4. Non morbid obesity, unspecified obesity type  Assessment & Plan:  Chronic-not stable:  Discussed importance of treating obstructive sleep apnea and getting sufficient sleep to assist with weight control. Encouraged her to work on weight loss through diet and exercise. Recommended DASH or Mediterranean diets. Reviewed, analyzed, and documented physiologic data from patient's PAP machine. This information was analyzed to assess complexity and medical decision making in regards to further testing and management. Diagnoses of ARLENE on CPAP, Coronary artery disease involving native coronary artery of native heart without angina pectoris, Essential hypertension, and Non morbid obesity, unspecified obesity type were pertinent to this visit. The chronic medical conditions listed are directly related to the primary diagnosis listed above. The management of the primary diagnosis affects the secondary diagnosis and vice versa. Subjective:   Subjective   Patient ID: Glenys Massey is a 57 y.o. female. Chief Complaint   Patient presents with    Sleep Apnea       HPI:  Machine Modem/Download Info:  Compliance (hours/night): 6.3 hrs/night  % of nights >= 4 hrs: 98 %  Download AHI (/hour): 1.7 /HR  Average CPAP Pressure : 12.1 cmH2O     APAP - Settings  Pressure Min: 11 cmH2O  Pressure Max: 20 cmH2O                 Comfort Settings  Flex/EPR (0-3): 3       She continues to do well with her machine at the current settings. No issues with EDS, snoring, or apneas. She is waking refreshed, for the most part, in the am.  No HA, dryness, or congestion. No issues using her machine.     [address]    [place] - Total score: 0    Social History     Socioeconomic History    Marital status:      Spouse name: Tee Alejandro Number of children: 2    Years of education: 12    Highest education level: Not on file   Occupational History    Occupation:      Employer: [employer]   Tobacco Use    Smoking status: Former Smoker     Packs/day: 2.00     Years: 27.00     Pack years: 54.00     Types: Cigarettes     Start date: 1/4/1981     Quit date: 1/1/2007     Years since quitting: 15.4    Smokeless tobacco: Never Used   Vaping Use    Vaping Use: Never used   Substance and Sexual Activity    Alcohol use: Never     Alcohol/week: 0.0 standard drinks    Drug use: Never    Sexual activity: Yes     Partners: Male   Other Topics Concern    Not on file   Social History Narrative    Not on file     Social Determinants of Health     Financial Resource Strain: Low Risk     Difficulty of Paying Living Expenses: Not hard at all   Food Insecurity: No Food Insecurity    Worried About Running Out of Food in the Last Year: Never true   [address] in the Last Year: Never true   Transportation Needs: No Transportation Needs    Lack of Transportation (Medical): No    Lack of Transportation (Non-Medical):  No   Physical Activity:     Days of Exercise per Week: Not on file    Minutes of Exercise per Session: Not on file   Stress:     Feeling of Stress : Not on file   Social Connections:     Frequency of Communication with Friends and Family: Not on file    Frequency of Social Gatherings with Friends and Family: Not on file    Attends Baptism Services: Not on file    Active Member of Clubs or Organizations: Not on file    Attends Club or Organization Meetings: Not on file    Marital Status: Not on file   Intimate Partner Violence:     Fear of Current or Ex-Partner: Not on file    Emotionally Abused: Not on file    Physically Abused: Not on file    Sexually Abused: Not on file   Housing Stability:     Unable to Pay for Housing in the Last Year: Not on file    Number of Places Lived in the Last Year: Not on file    Unstable Housing in the Last Year: Not on file       Current Outpatient Medications   Medication Instructions    aspirin 81 mg, Oral, DAILY    carvedilol (COREG) 6.25 mg, Oral, 2 TIMES DAILY    Cholecalciferol (VITAMIN D) 50 MCG (2000 UT) CAPS capsule 2 capsules, Oral, DAILY    clopidogrel (PLAVIX) 75 mg, Oral, DAILY    famotidine (PEPCID) 20 mg, Oral, 2 TIMES DAILY    fluticasone-salmeterol (ADVAIR HFA) 230-21 MCG/ACT inhaler 1-2 puffs, Inhalation, 2 TIMES DAILY    hydroCHLOROthiazide (MICROZIDE) 12.5 mg, Oral, EVERY MORNING    Insulin Pen Needle (B-D UF III MINI PEN NEEDLES) 31G X 5 MM MISC 1 each, Does not apply, DAILY    lisinopril (PRINIVIL;ZESTRIL) 20 mg, Oral, 2 TIMES DAILY    methIMAzole (TAPAZOLE) 5 mg, Oral, DAILY    nitroGLYCERIN (NITROSTAT) 0.4 mg, SubLINGual, EVERY 5 MIN PRN    potassium chloride (KLOR-CON M) 10 MEQ extended release tablet 10 mEq, Oral, DAILY    rosuvastatin (CRESTOR) 5 mg, Oral, DAILY    Semaglutide,0.25 or 0.5MG/DOS, (OZEMPIC, 0.25 OR 0.5 MG/DOSE,) 2 MG/1.5ML SOPN 0.25 mg    umeclidinium-vilanterol (ANORO ELLIPTA) 62.5-25 MCG/INH AEPB inhaler 1 puff, Inhalation, DAILY          Vitals:  Weight BMI   Wt Readings from Last 3 Encounters:   06/14/22 188 lb (85.3 kg)   06/01/22 188 lb (85.3 kg)   05/18/22 190 lb (86.2 kg)    Body mass index is 32.27 kg/m².      BP HR SaO2   BP Readings from Last 3 Encounters:   06/14/22 124/66   06/01/22 103/60   05/18/22 126/70    Pulse Readings from Last 3 Encounters:   06/14/22 60   06/01/22 64   05/18/22 58    SpO2 Readings from Last 3 Encounters:   06/14/22 97%   05/18/22 97%   05/12/22 97%        Electronically signed by Juan F Scherer MD on 6/14/2022 at 12:50 PM

## 2022-06-22 ENCOUNTER — HOSPITAL ENCOUNTER (OUTPATIENT)
Age: 62
Setting detail: OBSERVATION
Discharge: HOME | End: 2022-06-23
Attending: EMERGENCY MEDICINE | Admitting: INTERNAL MEDICINE
Payer: COMMERCIAL

## 2022-06-22 ENCOUNTER — APPOINTMENT (OUTPATIENT)
Dept: GENERAL RADIOLOGY | Age: 62
End: 2022-06-22
Payer: COMMERCIAL

## 2022-06-22 ENCOUNTER — APPOINTMENT (OUTPATIENT)
Dept: MRI IMAGING | Age: 62
End: 2022-06-22
Payer: COMMERCIAL

## 2022-06-22 ENCOUNTER — APPOINTMENT (OUTPATIENT)
Dept: CT IMAGING | Age: 62
End: 2022-06-22
Payer: COMMERCIAL

## 2022-06-22 DIAGNOSIS — R00.2 PALPITATIONS: ICD-10-CM

## 2022-06-22 DIAGNOSIS — R42 DIZZINESS: Primary | ICD-10-CM

## 2022-06-22 LAB
A/G RATIO: 1.6 (ref 1.1–2.2)
ALBUMIN SERPL-MCNC: 4.4 G/DL (ref 3.4–5)
ALP BLD-CCNC: 59 U/L (ref 40–129)
ALT SERPL-CCNC: 17 U/L (ref 10–40)
ANION GAP SERPL CALCULATED.3IONS-SCNC: 10 MMOL/L (ref 3–16)
APTT: 30.5 SEC (ref 23–34.3)
AST SERPL-CCNC: 17 U/L (ref 15–37)
BASOPHILS ABSOLUTE: 0 K/UL (ref 0–0.2)
BASOPHILS RELATIVE PERCENT: 0.7 %
BILIRUB SERPL-MCNC: 0.4 MG/DL (ref 0–1)
BILIRUBIN URINE: NEGATIVE
BLOOD, URINE: NEGATIVE
BUN BLDV-MCNC: 17 MG/DL (ref 7–20)
CALCIUM SERPL-MCNC: 9.7 MG/DL (ref 8.3–10.6)
CHLORIDE BLD-SCNC: 106 MMOL/L (ref 99–110)
CLARITY: CLEAR
CO2: 28 MMOL/L (ref 21–32)
COLOR: YELLOW
CREAT SERPL-MCNC: <0.5 MG/DL (ref 0.6–1.2)
EKG ATRIAL RATE: 68 BPM
EKG DIAGNOSIS: NORMAL
EKG P AXIS: 65 DEGREES
EKG P-R INTERVAL: 212 MS
EKG Q-T INTERVAL: 380 MS
EKG QRS DURATION: 84 MS
EKG QTC CALCULATION (BAZETT): 404 MS
EKG R AXIS: -1 DEGREES
EKG T AXIS: 32 DEGREES
EKG VENTRICULAR RATE: 68 BPM
EOSINOPHILS ABSOLUTE: 0.2 K/UL (ref 0–0.6)
EOSINOPHILS RELATIVE PERCENT: 3.1 %
GFR AFRICAN AMERICAN: >60
GFR NON-AFRICAN AMERICAN: >60
GLUCOSE BLD-MCNC: 110 MG/DL (ref 70–99)
GLUCOSE URINE: NEGATIVE MG/DL
HCT VFR BLD CALC: 40 % (ref 36–48)
HEMOGLOBIN: 13.3 G/DL (ref 12–16)
INR BLD: 0.9 (ref 0.87–1.14)
KETONES, URINE: NEGATIVE MG/DL
LEUKOCYTE ESTERASE, URINE: NEGATIVE
LYMPHOCYTES ABSOLUTE: 1.4 K/UL (ref 1–5.1)
LYMPHOCYTES RELATIVE PERCENT: 21.4 %
MAGNESIUM: 2.3 MG/DL (ref 1.8–2.4)
MCH RBC QN AUTO: 28.2 PG (ref 26–34)
MCHC RBC AUTO-ENTMCNC: 33.3 G/DL (ref 31–36)
MCV RBC AUTO: 84.6 FL (ref 80–100)
MICROSCOPIC EXAMINATION: NORMAL
MONOCYTES ABSOLUTE: 0.4 K/UL (ref 0–1.3)
MONOCYTES RELATIVE PERCENT: 6.2 %
NEUTROPHILS ABSOLUTE: 4.4 K/UL (ref 1.7–7.7)
NEUTROPHILS RELATIVE PERCENT: 68.6 %
NITRITE, URINE: NEGATIVE
PDW BLD-RTO: 13.3 % (ref 12.4–15.4)
PH UA: 7 (ref 5–8)
PLATELET # BLD: 281 K/UL (ref 135–450)
PMV BLD AUTO: 8.4 FL (ref 5–10.5)
POTASSIUM SERPL-SCNC: 4 MMOL/L (ref 3.5–5.1)
PRO-BNP: 41 PG/ML (ref 0–124)
PROTEIN UA: NEGATIVE MG/DL
PROTHROMBIN TIME: 12 SEC (ref 11.7–14.5)
RBC # BLD: 4.73 M/UL (ref 4–5.2)
SODIUM BLD-SCNC: 144 MMOL/L (ref 136–145)
SPECIFIC GRAVITY UA: 1.01 (ref 1–1.03)
T3 TOTAL: 1.56 NG/ML (ref 0.8–2)
T4 FREE: 1.1 NG/DL (ref 0.9–1.8)
TOTAL PROTEIN: 7.2 G/DL (ref 6.4–8.2)
TROPONIN: <0.01 NG/ML
TROPONIN: <0.01 NG/ML
TSH REFLEX: 0.06 UIU/ML (ref 0.27–4.2)
URINE REFLEX TO CULTURE: NORMAL
URINE TYPE: NORMAL
UROBILINOGEN, URINE: 0.2 E.U./DL
WBC # BLD: 6.4 K/UL (ref 4–11)

## 2022-06-22 PROCEDURE — 6370000000 HC RX 637 (ALT 250 FOR IP): Performed by: PHYSICIAN ASSISTANT

## 2022-06-22 PROCEDURE — G0378 HOSPITAL OBSERVATION PER HR: HCPCS

## 2022-06-22 PROCEDURE — 81003 URINALYSIS AUTO W/O SCOPE: CPT

## 2022-06-22 PROCEDURE — 6360000004 HC RX CONTRAST MEDICATION: Performed by: PHYSICIAN ASSISTANT

## 2022-06-22 PROCEDURE — 85730 THROMBOPLASTIN TIME PARTIAL: CPT

## 2022-06-22 PROCEDURE — 83880 ASSAY OF NATRIURETIC PEPTIDE: CPT

## 2022-06-22 PROCEDURE — 36415 COLL VENOUS BLD VENIPUNCTURE: CPT

## 2022-06-22 PROCEDURE — 6370000000 HC RX 637 (ALT 250 FOR IP): Performed by: INTERNAL MEDICINE

## 2022-06-22 PROCEDURE — 70496 CT ANGIOGRAPHY HEAD: CPT

## 2022-06-22 PROCEDURE — 80053 COMPREHEN METABOLIC PANEL: CPT

## 2022-06-22 PROCEDURE — 2580000003 HC RX 258: Performed by: INTERNAL MEDICINE

## 2022-06-22 PROCEDURE — 85610 PROTHROMBIN TIME: CPT

## 2022-06-22 PROCEDURE — 83735 ASSAY OF MAGNESIUM: CPT

## 2022-06-22 PROCEDURE — 84439 ASSAY OF FREE THYROXINE: CPT

## 2022-06-22 PROCEDURE — 85025 COMPLETE CBC W/AUTO DIFF WBC: CPT

## 2022-06-22 PROCEDURE — 99219 PR INITIAL OBSERVATION CARE/DAY 50 MINUTES: CPT | Performed by: INTERNAL MEDICINE

## 2022-06-22 PROCEDURE — 84480 ASSAY TRIIODOTHYRONINE (T3): CPT

## 2022-06-22 PROCEDURE — 84443 ASSAY THYROID STIM HORMONE: CPT

## 2022-06-22 PROCEDURE — 71045 X-RAY EXAM CHEST 1 VIEW: CPT

## 2022-06-22 PROCEDURE — 93005 ELECTROCARDIOGRAM TRACING: CPT | Performed by: EMERGENCY MEDICINE

## 2022-06-22 PROCEDURE — 93010 ELECTROCARDIOGRAM REPORT: CPT | Performed by: INTERNAL MEDICINE

## 2022-06-22 PROCEDURE — 70450 CT HEAD/BRAIN W/O DYE: CPT

## 2022-06-22 PROCEDURE — 70551 MRI BRAIN STEM W/O DYE: CPT

## 2022-06-22 PROCEDURE — 84484 ASSAY OF TROPONIN QUANT: CPT

## 2022-06-22 PROCEDURE — 99285 EMERGENCY DEPT VISIT HI MDM: CPT

## 2022-06-22 RX ORDER — ONDANSETRON 2 MG/ML
4 INJECTION INTRAMUSCULAR; INTRAVENOUS EVERY 6 HOURS PRN
Status: DISCONTINUED | OUTPATIENT
Start: 2022-06-22 | End: 2022-06-23 | Stop reason: HOSPADM

## 2022-06-22 RX ORDER — SODIUM CHLORIDE 0.9 % (FLUSH) 0.9 %
5-40 SYRINGE (ML) INJECTION PRN
Status: DISCONTINUED | OUTPATIENT
Start: 2022-06-22 | End: 2022-06-23 | Stop reason: HOSPADM

## 2022-06-22 RX ORDER — LISINOPRIL 20 MG/1
20 TABLET ORAL 2 TIMES DAILY
Status: DISCONTINUED | OUTPATIENT
Start: 2022-06-23 | End: 2022-06-23 | Stop reason: HOSPADM

## 2022-06-22 RX ORDER — ASPIRIN 81 MG/1
243 TABLET, CHEWABLE ORAL ONCE
Status: COMPLETED | OUTPATIENT
Start: 2022-06-22 | End: 2022-06-22

## 2022-06-22 RX ORDER — SODIUM CHLORIDE 0.9 % (FLUSH) 0.9 %
5-40 SYRINGE (ML) INJECTION EVERY 12 HOURS SCHEDULED
Status: DISCONTINUED | OUTPATIENT
Start: 2022-06-22 | End: 2022-06-23 | Stop reason: HOSPADM

## 2022-06-22 RX ORDER — ACETAMINOPHEN 650 MG/1
650 SUPPOSITORY RECTAL EVERY 6 HOURS PRN
Status: DISCONTINUED | OUTPATIENT
Start: 2022-06-22 | End: 2022-06-23 | Stop reason: HOSPADM

## 2022-06-22 RX ORDER — ACETAMINOPHEN 325 MG/1
650 TABLET ORAL EVERY 6 HOURS PRN
Status: DISCONTINUED | OUTPATIENT
Start: 2022-06-22 | End: 2022-06-23 | Stop reason: HOSPADM

## 2022-06-22 RX ORDER — ENOXAPARIN SODIUM 100 MG/ML
40 INJECTION SUBCUTANEOUS DAILY
Status: DISCONTINUED | OUTPATIENT
Start: 2022-06-23 | End: 2022-06-23 | Stop reason: HOSPADM

## 2022-06-22 RX ORDER — ONDANSETRON 4 MG/1
4 TABLET, ORALLY DISINTEGRATING ORAL EVERY 8 HOURS PRN
Status: DISCONTINUED | OUTPATIENT
Start: 2022-06-22 | End: 2022-06-23 | Stop reason: HOSPADM

## 2022-06-22 RX ORDER — METHIMAZOLE 5 MG/1
5 TABLET ORAL DAILY
Status: DISCONTINUED | OUTPATIENT
Start: 2022-06-22 | End: 2022-06-23 | Stop reason: HOSPADM

## 2022-06-22 RX ORDER — CLOPIDOGREL BISULFATE 75 MG/1
75 TABLET ORAL DAILY
Status: DISCONTINUED | OUTPATIENT
Start: 2022-06-22 | End: 2022-06-23 | Stop reason: HOSPADM

## 2022-06-22 RX ORDER — ROSUVASTATIN CALCIUM 10 MG/1
5 TABLET, COATED ORAL DAILY
Status: DISCONTINUED | OUTPATIENT
Start: 2022-06-22 | End: 2022-06-23 | Stop reason: HOSPADM

## 2022-06-22 RX ORDER — MECLIZINE HCL 12.5 MG/1
25 TABLET ORAL ONCE
Status: COMPLETED | OUTPATIENT
Start: 2022-06-22 | End: 2022-06-22

## 2022-06-22 RX ORDER — ASPIRIN 81 MG/1
81 TABLET ORAL DAILY
Status: DISCONTINUED | OUTPATIENT
Start: 2022-06-22 | End: 2022-06-23 | Stop reason: HOSPADM

## 2022-06-22 RX ORDER — POLYETHYLENE GLYCOL 3350 17 G/17G
17 POWDER, FOR SOLUTION ORAL DAILY PRN
Status: DISCONTINUED | OUTPATIENT
Start: 2022-06-22 | End: 2022-06-23 | Stop reason: HOSPADM

## 2022-06-22 RX ORDER — SODIUM CHLORIDE 9 MG/ML
INJECTION, SOLUTION INTRAVENOUS PRN
Status: DISCONTINUED | OUTPATIENT
Start: 2022-06-22 | End: 2022-06-23 | Stop reason: HOSPADM

## 2022-06-22 RX ADMIN — SODIUM CHLORIDE, PRESERVATIVE FREE 10 ML: 5 INJECTION INTRAVENOUS at 21:30

## 2022-06-22 RX ADMIN — IOPAMIDOL 75 ML: 755 INJECTION, SOLUTION INTRAVENOUS at 10:59

## 2022-06-22 RX ADMIN — ASPIRIN 81 MG: 81 TABLET, COATED ORAL at 21:30

## 2022-06-22 RX ADMIN — ASPIRIN 243 MG: 81 TABLET, CHEWABLE ORAL at 14:49

## 2022-06-22 RX ADMIN — MECLIZINE 25 MG: 12.5 TABLET ORAL at 09:40

## 2022-06-22 RX ADMIN — ROSUVASTATIN 5 MG: 10 TABLET, FILM COATED ORAL at 21:30

## 2022-06-22 ASSESSMENT — PAIN SCALES - GENERAL
PAINLEVEL_OUTOF10: 0
PAINLEVEL_OUTOF10: 0

## 2022-06-22 ASSESSMENT — ENCOUNTER SYMPTOMS
NAUSEA: 1
WHEEZING: 0
CONSTIPATION: 0
VOMITING: 0
BACK PAIN: 0
COUGH: 0
COLOR CHANGE: 0
SHORTNESS OF BREATH: 1
ABDOMINAL PAIN: 0
DIARRHEA: 0
STRIDOR: 0

## 2022-06-22 ASSESSMENT — LIFESTYLE VARIABLES: HOW OFTEN DO YOU HAVE A DRINK CONTAINING ALCOHOL: NEVER

## 2022-06-22 NOTE — ACP (ADVANCE CARE PLANNING)
Advanced Care Planning Note. Purpose of Encounter: Advanced care planning in light of hospitalization  Parties In Attendance: Patient,    Decisional Capacity: Yes  Subjective: Patient  understand that this conversation is to address long term care goal  Objective: Admitted to hospital with vertigo history of coronary artery disease and hypothyroidism  Goals of Care Determination: Patient would pursue CPR and Intubation if required. No tracheostomy or long term ventilation if required.      Code Status: full code  Time spent on Advanced care Plannin minutes  Advanced Care Planning Documents: documented patient's wishes, would like  Brent Saldaña to make medical decisions if unable to make decisions    Inés Cervantes MD  2022 2:59 PM

## 2022-06-22 NOTE — CONSULTS
[address] [facility]  [phone]      Chief Complaint   Patient presents with    Dizziness     dizziness and nausea since this am             History of Present Illness:  Marj Rojas is a 57 y.o. patient who presented to the hospital with complaints of dizziness/room spinning. Started this morning. Has never happened before. She felt nautious. There was some palpitations noted. She came to the ED. CT/MRI showed no stroke. CAD, Htn, Hld. Nov '21 scheduled LHC after abnormal stress test with PCI to RCA. . I have been asked to provide consultation regarding further management and testing. Currently she has no symptoms. Past Medical History:   has a past medical history of Arthritis, Breast lump or mass, CAD (coronary artery disease), Gallstones, Hyperlipidemia, Hypertension, and Thyroid disease. Surgical History:   has a past surgical history that includes Colonoscopy (8.1.2010); Hysterectomy (2007); Breast surgery (7935,2431,7131); Cystoscopy (7.19.2007); Cholecystectomy, laparoscopic (02/03/2017); skin biopsy; and Tonsillectomy. Social History:   reports that she quit smoking about 15 years ago. Her smoking use included cigarettes. She started smoking about 41 years ago. She has a 54.00 pack-year smoking history. She has never used smokeless tobacco. She reports that she does not drink alcohol and does not use drugs. Family History:  family history includes Cancer (age of onset: 52) in her mother; Cancer (age of onset: 48) in her brother; Cancer (age of onset: 64) in her father; Heart Disease in her father and another family member. Home Medications:  Were reviewed and are listed in nursing record. and/or listed below  Prior to Admission medications    Medication Sig Start Date End Date Taking?  Authorizing Provider   methIMAzole (TAPAZOLE) 5 MG tablet Take 1 tablet by mouth daily 6/1/22 6/1/23  Maryana Luevano MD   carvedilol (COREG) 6.25 MG tablet Take 1 tablet by mouth 2 times daily 5/18/22   Alexa Olmos MD   Semaglutide,0.25 or 0.5MG/DOS, (OZEMPIC, 0.25 OR 0.5 MG/DOSE,) 2 MG/1.5ML SOPN 0.25 mg  Patient taking differently: Inject 0.5 mg into the skin once a week 0.5 mg on Fridays 5/18/22   Alexa Olmos MD   umeclidinium-vilanterol (ANORO ELLIPTA) 62.5-25 MCG/INH AEPB inhaler Inhale 1 puff into the lungs daily 5/18/22   Alexa Olmos MD   rosuvastatin (CRESTOR) 5 MG tablet Take 1 tablet by mouth daily 3/18/22   Olu Henson MD   Insulin Pen Needle (B-D UF III MINI PEN NEEDLES) 31G X 5 MM MISC 1 each by Does not apply route daily 2/24/22   Natalia Barton MD   lisinopril (PRINIVIL;ZESTRIL) 20 MG tablet Take 1 tablet by mouth 2 times daily 2/18/22   Alexa Olmos MD   hydroCHLOROthiazide (MICROZIDE) 12.5 MG capsule Take 1 capsule by mouth every morning 2/18/22   Alexa Olmos MD   nitroGLYCERIN (NITROSTAT) 0.4 MG SL tablet Place 1 tablet under the tongue every 5 minutes as needed for Chest pain 2/7/22   Olu Henson MD   famotidine (PEPCID) 20 MG tablet Take 1 tablet by mouth 2 times daily  Patient taking differently: Take 20 mg by mouth nightly  11/19/21   Alexa Olmos MD   Cholecalciferol (VITAMIN D) 50 MCG (2000 UT) CAPS capsule Take 2 capsules by mouth daily    Historical Provider, MD   clopidogrel (PLAVIX) 75 MG tablet Take 1 tablet by mouth daily 11/16/21   Olu Henson MD   potassium chloride (KLOR-CON M) 10 MEQ extended release tablet Take 1 tablet by mouth daily 8/19/21   Alexa Olmos MD   aspirin 81 MG EC tablet Take 81 mg by mouth daily    Historical Provider, MD        Current Medications:  Current Facility-Administered Medications   Medication Dose Route Frequency Provider Last Rate Last Admin    aspirin EC tablet 81 mg  81 mg Oral Daily Chapito Birch MD   81 mg at 06/22/22 5350    clopidogrel (PLAVIX) tablet 75 mg  75 mg Oral Daily Chapito Birch MD   75 mg at 06/23/22 0942    lisinopril (PRINIVIL;ZESTRIL) tablet 20 mg  20 mg Oral BID Deepinder Marlen Barreto MD   20 mg at 06/23/22 7061    methIMAzole (TAPAZOLE) tablet 5 mg  5 mg Oral Daily Jenelle Hayes MD        rosuvastatin (CRESTOR) tablet 5 mg  5 mg Oral Daily Jenelle Hayes MD   5 mg at 06/22/22 2130    sodium chloride flush 0.9 % injection 5-40 mL  5-40 mL IntraVENous 2 times per day Jenelle Hayes MD   10 mL at 06/22/22 2130    sodium chloride flush 0.9 % injection 5-40 mL  5-40 mL IntraVENous PRN Jenelle Hayes MD        0.9 % sodium chloride infusion   IntraVENous PRN Jenelle Hayes MD        enoxaparin (LOVENOX) injection 40 mg  40 mg SubCUTAneous Daily Jenelle Hayes MD        ondansetron (ZOFRAN-ODT) disintegrating tablet 4 mg  4 mg Oral Q8H PRN Brian Barreto MD        Or    ondansetron (ZOFRAN) injection 4 mg  4 mg IntraVENous Q6H PRN Jenelle Hayes MD        polyethylene glycol (GLYCOLAX) packet 17 g  17 g Oral Daily PRN Jenelle Hayes MD        acetaminophen (TYLENOL) tablet 650 mg  650 mg Oral Q6H PRN Jenelle Hayes MD        Or   Garcia acetaminophen (TYLENOL) suppository 650 mg  650 mg Rectal Q6H PRN Jenelle Hayes MD            Allergies:  Penicillins, Levaquin [levofloxacin in d5w], and Morphine     Review of Systems:     · Constitutional: there has been no unanticipated weight loss. There's been no change in energy level, sleep pattern, or activity level. · Eyes: No visual changes or diplopia. No scleral icterus. · ENT: No Headaches, hearing loss or vertigo. No mouth sores or sore throat. · Cardiovascular: Reviewed in HPI  · Respiratory: No cough or wheezing, no sputum production. No hematemesis. · Gastrointestinal: No abdominal pain, appetite loss, blood in stools. No change in bowel or bladder habits. · Genitourinary: No dysuria, trouble voiding, or hematuria. · Musculoskeletal:  No gait disturbance, weakness or joint complaints. · Integumentary: No rash or pruritis.   · Neurological: No headache, diplopia, change in muscle strength, numbness or tingling. No change in gait, balance, coordination, mood, affect, memory, mentation, behavior. · Psychiatric: No anxiety, no depression. · Endocrine: No malaise, fatigue or temperature intolerance. No excessive thirst, fluid intake, or urination. No tremor. · Hematologic/Lymphatic: No abnormal bruising or bleeding, blood clots or swollen lymph nodes. · Allergic/Immunologic: No nasal congestion or hives.   ·     Physical Examination:    Vitals:    06/23/22 1105   BP: (!) 143/79   Pulse: 58   Resp: 16   Temp: 97.7 °F (36.5 °C)   SpO2: 95%    Weight: 184 lb 4.8 oz (83.6 kg)         General Appearance:  Alert, cooperative, no distress, appears stated age   Head:  Normocephalic, without obvious abnormality, atraumatic   Eyes:  PERRL, conjunctiva/corneas clear       Nose: Nares normal, no drainage or sinus tenderness   Throat: Lips, mucosa, and tongue normal   Neck: Supple, symmetrical, trachea midline, no adenopathy, thyroid: not enlarged, symmetric, no tenderness/mass/nodules, no carotid bruit or JVD       Lungs:   Clear to auscultation bilaterally, respirations unlabored   Chest Wall:  No tenderness or deformity   Heart:  Regular rate and rhythm, S1, S2 normal, no murmur, rub or gallop   Abdomen:   Soft, non-tender, bowel sounds active all four quadrants,  no masses, no organomegaly           Extremities: Extremities normal, atraumatic, no cyanosis or edema   Pulses: 2+ and symmetric   Skin: Skin color, texture, turgor normal, no rashes or lesions   Pysch: Normal mood and affect   Neurologic: Normal gross motor and sensory exam.         Labs  CBC:   Lab Results   Component Value Date    WBC 6.5 06/23/2022    RBC 4.62 06/23/2022    HGB 12.8 06/23/2022    HCT 38.8 06/23/2022    MCV 84.1 06/23/2022    RDW 13.0 06/23/2022     06/23/2022     CMP:    Lab Results   Component Value Date     06/23/2022    K 3.3 06/23/2022     06/23/2022    CO2 28 06/23/2022    BUN 15 06/23/2022    CREATININE <0.5 06/23/2022    GFRAA >60 06/23/2022    GFRAA >60 06/13/2013    AGRATIO 1.6 06/22/2022    LABGLOM >60 06/23/2022    GLUCOSE 89 06/23/2022    PROT 7.2 06/22/2022    PROT 7.4 07/31/2010    CALCIUM 9.3 06/23/2022    BILITOT 0.4 06/22/2022    ALKPHOS 59 06/22/2022    AST 17 06/22/2022    ALT 17 06/22/2022     PT/INR:  No results found for: PTINR  Lab Results   Component Value Date    TROPONINI <0.01 06/22/2022       EKG:  I have reviewed EKG with the following interpretation:  Impression:  Sinus rhythm with 1st degree A-V blockConfirmed     Pt has CAD with following history:  Stress 2/16/22  Summary  The overall quality of the study is good. There is subdiaphragmatic  attenuation. Mitch Bosch left ventricle is normal in size. The right ventricle is normal in size. SPECT images demonstrate homogeneous tracer distribution throughout the  myocardium. There is normal isotope uptake at stress and rest. There is no  evidence of myocardial ischemia or scar. Gated spect reveals normal  myocardial thickening and wall motion. Sum stress score of 2. No visual TID. Calculated TID is 1.02. Left ventricular ejection fraction is normal at 67%. Myocardial perfusion is normal. Low risk scan. Hypertensive response to  exercise  Recommendation  Follow up with Dr. Sudeep Stoner about blood pressure management         Cardiac Cath PCI FFR: 11/16/21 [facility])  Anatomy:   LM-nml   LAD-nml  Cx-nml  OM- prox 50%  RCA-mid 80% prox 20%  RPDA- nml  LVEF- 60  LVG- nml  LVEDP- 9     LCX FFR 0.96  RCA FFR 0.84  RCA DFR 0.86     Intervention  ~Successful PCI to RCA with 4.0x18 AARON. PD with 4.0x12 NC to 24atm Excellent Result. All testing and labs listed below were personally reviewed. Stress 11/1/21  Summary  The overall quality of the study is good. There is subdiaphragmatic  attenuation.    The left ventricular cavity size is normal at 92 ml.  The right ventricle is  normal in size.    SPECT images demonstrate a small area of mildly decreased perfusion at the  apex. The defect is present at rest and stress, consistent with prior  infarct.    Sum stress score of 1. No visual TID. Calculated TID is 0.87    Calculated LVEF is normal at 68%.  Myocardial perfusion is abnormal. Moderate risk scan. Symptoms with stress. Recommendation  Recommend coronary angiogram to further evaluate. Results relayed to Dr. José Miguel Singh      Echo 9/30/21  Summary  Normal left ventricle size, mild concentric wall thickness and normal systolic function with an estimated ejection fraction of 55-60%. No regional wall motion abnormalities are seen. Normal diastolic function. E/e\"=9.3. Aortic valve appears sclerotic but opens adequately. Trivial pulmonic regurgitation present. Normal RV/RA size. Pulmonic valve size 2.01 cm (limited visualization). Unable to estimate pulmonary artery pressure secondary to absent TR jet envelope       Assessment  Patient Active Problem List   Diagnosis    Hyperlipidemia    Non morbid obesity, unspecified obesity type    Essential hypertension    Breast lump or mass    Family history of lung cancer    Family history of essential hypertension    Multinodular goiter    Coronary artery disease involving native coronary artery of native heart without angina pectoris    Pulmonary hypertension ([address]    ARLENE on CPAP    Olecranon bursitis of right elbow    COVID-19 virus infection    Obstructive airway disease ([address]    Vertigo         Plan:    I had the opportunity to review the clinical symptoms and presentation of Dean Rea. Assessment/Plan:  Principal Problem:    Vertigo  Plan: no cardiac cause for symptoms.  Normal bp and HR      Coronary artery disease involving native coronary artery of native heart without angina pectoris  Angina~ none   CCS class~1  Intervention  [facility]~ 11/16/21 PCI to RCA  Echo~ 9/30/21 EF 55-60%  Stress~ 11/2021 abnormal -> [facility]  2/2022 low risk scan with hypertensive response to exercise  Current meds~ plavix / asa  Plan~ DAPT

## 2022-06-22 NOTE — ED PROVIDER NOTES
[facility]        Pt Name: Maryan Martinez  MRN: [id number]  [place] 1960  Date of evaluation: 6/22/2022  Provider: Reji Lomax PA-C  PCP: Darnell Guerrero MD  Note Started: 9:20 AM EDT        I have seen and evaluated this patient with my supervising physician Violetta Leyva MD.    [facility]       Chief Complaint   Patient presents with    Dizziness     dizziness and nausea since this am        HISTORY OF PRESENT ILLNESS   (Location, Timing/Onset, Context/Setting, Quality, Duration, Modifying Factors, Severity, Associated Signs and Symptoms)  Note limiting factors. Chief Complaint: Dizziness, nausea, palpitations    Maryan Martinez is a 57 y.o. female who presents to the emergency department stating that she has had intermittent palpitations since last night. She went to bed around 10:30 PM and states that when she woke up this morning at 6 AM and got up out of bed, she felt very dizzy. She feels that the room is spinning to the left side with certain head movement. While laying in bed, patient states that she is not experiencing any dizziness. She does occasionally get nauseated when she gets dizzy. She denies any palpitations at this time. Denies chest pain, shortness of breath, headache, vision changes, abdominal pain, vomiting, diarrhea, constipation or urinary symptoms. She does have history of coronary disease and had a stent put in in November by Dr. Nolberto Torrez. Nursing Notes were all reviewed and agreed with or any disagreements were addressed in the HPI. REVIEW OF SYSTEMS    (2-9 systems for level 4, 10 or more for level 5)     Review of Systems   Constitutional: Negative for chills and fever. HENT: Negative. Eyes: Negative for visual disturbance. Respiratory: Positive for shortness of breath. Negative for cough, wheezing and stridor. Cardiovascular: Positive for palpitations. Negative for chest pain and leg swelling. Gastrointestinal: Positive for nausea. Negative for abdominal pain, constipation, diarrhea and vomiting. Genitourinary: Negative. Musculoskeletal: Negative for back pain, neck pain and neck stiffness. Skin: Negative for color change, pallor, rash and wound. Neurological: Positive for dizziness. Negative for tremors, seizures, syncope, facial asymmetry, speech difficulty, weakness, light-headedness, numbness and headaches. Psychiatric/Behavioral: Negative for confusion. All other systems reviewed and are negative. Positives and Pertinent negatives as per HPI. Except as noted above in the ROS, all other systems were reviewed and negative. PAST MEDICAL HISTORY     Past Medical History:   Diagnosis Date    Arthritis     R KNEE-EVENTUALLY NEEDS RTKR    Breast lump or mass     CAD (coronary artery disease)     Gallstones     Hyperlipidemia     Hypertension     Thyroid disease     MULTINODULAR-6/2020         SURGICAL HISTORY     Past Surgical History:   Procedure Laterality Date    BREAST SURGERY  1099,1865,1020    CHOLECYSTECTOMY, LAPAROSCOPIC  02/03/2017    COLONOSCOPY  8.1.2010    CYSTOSCOPY  7.19.2007    HYSTERECTOMY (CERVIX STATUS UNKNOWN)  2007    SKIN BIOPSY      UPPER CHEST MOLE BX-2010    TONSILLECTOMY      AGE 22 YRS.           CURRENTMEDICATIONS       Previous Medications    ASPIRIN 81 MG EC TABLET    Take 81 mg by mouth daily    CARVEDILOL (COREG) 6.25 MG TABLET    Take 1 tablet by mouth 2 times daily    CHOLECALCIFEROL (VITAMIN D) 50 MCG (2000 UT) CAPS CAPSULE    Take 2 capsules by mouth daily    CLOPIDOGREL (PLAVIX) 75 MG TABLET    Take 1 tablet by mouth daily    FAMOTIDINE (PEPCID) 20 MG TABLET    Take 1 tablet by mouth 2 times daily    HYDROCHLOROTHIAZIDE (MICROZIDE) 12.5 MG CAPSULE    Take 1 capsule by mouth every morning    INSULIN PEN NEEDLE (B-D UF III MINI PEN NEEDLES) 31G X 5 MM MISC    1 each by Does not apply route daily    LISINOPRIL (PRINIVIL;ZESTRIL) 20 MG TABLET    Take 1 tablet by mouth 2 times daily    METHIMAZOLE (TAPAZOLE) 5 MG TABLET    Take 1 tablet by mouth daily    NITROGLYCERIN (NITROSTAT) 0.4 MG SL TABLET    Place 1 tablet under the tongue every 5 minutes as needed for Chest pain    POTASSIUM CHLORIDE (KLOR-CON M) 10 MEQ EXTENDED RELEASE TABLET    Take 1 tablet by mouth daily    ROSUVASTATIN (CRESTOR) 5 MG TABLET    Take 1 tablet by mouth daily    SEMAGLUTIDE,0.25 OR 0.5MG/DOS, (OZEMPIC, 0.25 OR 0.5 MG/DOSE,) 2 MG/1.5ML SOPN    0.25 mg    UMECLIDINIUM-VILANTEROL (ANORO ELLIPTA) 62.5-25 MCG/INH AEPB INHALER    Inhale 1 puff into the lungs daily         ALLERGIES     Penicillins, Levaquin [levofloxacin in d5w], and Morphine    FAMILYHISTORY       Family History   Problem Relation Age of Onset    Cancer Mother 52        UTERINE AND LUNG, SMOKER    Cancer Father 64        THROAT CANCER    Heart Disease Father     Cancer Brother 48        THROAT    Heart Disease Other           SOCIAL HISTORY       Social History     Tobacco Use    Smoking status: Former Smoker     Packs/day: 2.00     Years: 27.00     Pack years: 54.00     Types: Cigarettes     Start date: 1/4/1981     Quit date: 1/1/2007     Years since quitting: 15.4    Smokeless tobacco: Never Used   Vaping Use    Vaping Use: Never used   Substance Use Topics    Alcohol use: Never     Alcohol/week: 0.0 standard drinks    Drug use: Never       SCREENINGS   NIH Stroke Scale  Interval: Baseline  Level of Consciousness (1a): Alert  LOC Questions (1b): Answers both correctly  LOC Commands (1c): Performs both tasks correctly  Best Gaze (2): Normal  Visual (3): No visual loss  Facial Palsy (4): Normal symmetrical movement  Motor Arm, Left (5a): No drift  Motor Arm, Right (5b): No drift  Motor Leg, Left (6a): No drift  Motor Leg, Right (6b):  No drift  Limb Ataxia (7): Absent  Sensory (8): Normal  Best Language (9): No aphasia  Dysarthria (10): Normal  Extinction and Inattention (11): No abnormality  Total: 0         PHYSICAL EXAM    (up to 7 for level 4, 8 or more for level 5)     ED Triage Vitals [06/22/22 0858]   BP Temp Temp Source Heart Rate Resp SpO2 Height Weight   (!) 160/70 98 °F (36.7 °C) Oral 64 16 95 % -- --       Physical Exam  Vitals and nursing note reviewed. Constitutional:       Appearance: Normal appearance. She is well-developed. She is not toxic-appearing or diaphoretic. HENT:      Head: Normocephalic and atraumatic. Right Ear: External ear normal.      Left Ear: External ear normal.      Nose: Nose normal.      Mouth/Throat:      Mouth: Mucous membranes are moist.      Pharynx: Oropharynx is clear. Eyes:      General: No scleral icterus. Right eye: No discharge. Left eye: No discharge. Extraocular Movements: Extraocular movements intact. Conjunctiva/sclera: Conjunctivae normal.      Pupils: Pupils are equal, round, and reactive to light. Neck:      Trachea: Trachea and phonation normal.      Meningeal: Brudzinski's sign and Kernig's sign absent. Cardiovascular:      Rate and Rhythm: Normal rate. Heart sounds: Murmur (soft systolic) heard. Pulmonary:      Effort: Pulmonary effort is normal.      Breath sounds: Normal breath sounds. Abdominal:      General: Bowel sounds are normal.      Palpations: Abdomen is soft. Tenderness: There is no abdominal tenderness. There is no right CVA tenderness or left CVA tenderness. Musculoskeletal:         General: Normal range of motion. Cervical back: Full passive range of motion without pain, normal range of motion and neck supple. Comments: No extremity edema, posterior calf or thigh tenderness, palpable cord, discoloration. Negative homans. Lymphadenopathy:      Cervical: No cervical adenopathy. Skin:     General: Skin is warm and dry. Capillary Refill: Capillary refill takes less than 2 seconds.       Coloration: Skin is not jaundiced or pale.      Findings: No bruising, erythema, lesion or rash. Neurological:      General: No focal deficit present. Mental Status: She is alert and oriented to person, place, and time. Cranial Nerves: No cranial nerve deficit (II-XII intact). Comments: No pronator drift, facial droop or slurred speech. Normal finger to nose coordination. Normal rapid alternating hand movement. Normal heel to shin coordination  Positive Hilton Hallpike negative with fatiguable leftward nystagmus. 5 out of 5 strength in all 4 extremities without focal weakness, paresthesia or radiculopathy. Psychiatric:         Mood and Affect: Mood normal.         Behavior: Behavior normal.         DIAGNOSTIC RESULTS   LABS:    Labs Reviewed   COMPREHENSIVE METABOLIC PANEL - Abnormal; Notable for the following components:       Result Value    Glucose 110 (*)     CREATININE <0.5 (*)     All other components within normal limits   CBC WITH AUTO DIFFERENTIAL   TROPONIN   BRAIN NATRIURETIC PEPTIDE   URINALYSIS WITH REFLEX TO CULTURE   PROTIME-INR   APTT   MAGNESIUM   TSH WITH REFLEX   T4, FREE       When ordered only abnormal lab results are displayed. All other labs were within normal range or not returned as of this dictation. EKG: When ordered, EKG's are interpreted by the Emergency Department Physician in the absence of a cardiologist.  Please see their note for interpretation of EKG. RADIOLOGY:   Non-plain film images such as CT, Ultrasound and MRI are read by the radiologist. Plain radiographic images are visualized and preliminarily interpreted by the ED Provider with the below findings:        Interpretation per the Radiologist below, if available at the time of this note:    MRI BRAIN [facility]   Final Result   No acute infarct or other acute intracranial process.       Scattered patchy foci of T2/FLAIR hyperintensity are present within   supratentorial white matter which is a nonspecific finding but likely represents mild chronic microvascular ischemia. RECOMMENDATIONS:   Unavailable         CTA HEAD NECK W CONTRAST   Final Result   1. No apparent arterial high grade stenosis, occlusion or aneurysm within the   head or neck. 2. Mildly enlarged, heterogeneous thyroid gland. Nonemergent thyroid   ultrasound recommended for further evaluation. RECOMMENDATIONS:   Unavailable         CT HEAD WO CONTRAST   Final Result   No acute intracranial abnormality. XR CHEST PORTABLE   Final Result   No acute cardiopulmonary disease. PROCEDURES   Unless otherwise noted below, none     Procedures    CRITICAL CARE TIME   n/a    CONSULTS:  IP CONSULT TO HOSPITALIST      EMERGENCY DEPARTMENT COURSE and DIFFERENTIAL DIAGNOSIS/MDM:   Vitals:    Vitals:    06/22/22 0858   BP: (!) 160/70   Pulse: 64   Resp: 16   Temp: 98 °F (36.7 °C)   TempSrc: Oral   SpO2: 95%       Patient was given the following medications:  Medications   aspirin chewable tablet 243 mg (has no administration in time range)   meclizine (ANTIVERT) tablet 25 mg (25 mg Oral Given 6/22/22 0940)   iopamidol (ISOVUE-370) 76 % injection 75 mL (75 mLs IntraVENous Given 6/22/22 1059)         Is this patient to be included in the SEP-1 Core Measure due to severe sepsis or septic shock? No   Exclusion criteria - the patient is NOT to be included for SEP-1 Core Measure due to: Infection is not suspected    This patient presents to the emergency department with complaints of intermittent dizziness and palpitations since yesterday. She does have history of coronary disease. Denies chest pain. CT head, CTA head and neck, and MRI brain appears stable at this time. Given her cardiac history and presentation, we do feel admission is warranted to rule out cardiac etiology. Patient understands and agrees with plan.   My suspicion is low for carotid dissection, sinus abscess, acute fracture, acute CVA, ICH, SAH, TIA, meningitis, encephalitis, pseudotumor cerebri, temporal arteritis, sentinel bleed from ruptured aneurysm, hypertensive urgency or emergency, subdural hematoma, epidural hematoma, cerebellar compromise, posterior stroke, Chiari malformation, hydrocephalus, skull or facial fracture, postconcussive syndrome, or other concerning pathology      FINAL IMPRESSION      1. Dizziness    2. Palpitations          DISPOSITION/PLAN   DISPOSITION Decision To Admit 06/22/2022 12:43:56 PM      PATIENT REFERRED TO:  No follow-up provider specified.     DISCHARGE MEDICATIONS:  New Prescriptions    No medications on file       DISCONTINUED MEDICATIONS:  Discontinued Medications    FLUTICASONE-SALMETEROL (ADVAIR HFA) 230-21 MCG/ACT INHALER    Inhale 1-2 puffs into the lungs 2 times daily              (Please note that portions of this note were completed with a voice recognition program.  Efforts were made to edit the dictations but occasionally words are mis-transcribed.)    Emy Yao PA-C (electronically signed)           Emy Yao PA-C  06/22/22 7706

## 2022-06-22 NOTE — PROGRESS NOTES
Pharmacy Medication History Note      List of current medications patient is taking is complete. Source of information: fill history, chart review    Changes made to medication list:    Medications removed (include reason, ex. therapy complete or physician discontinued):   Advair HFA inhaler - alternate therapy     Medications added/doses adjusted:  Unsure of ozempic dose - need to clarify with patient (nonformulary medication)    Kate Thomas, PharmD, [facility]  Clinical Pharmacist  [id number]

## 2022-06-22 NOTE — H&P
HOSPITALISTS HISTORY AND PHYSICAL    6/22/2022 2:55 PM    Patient Information:  Luisito Lei is a 57 y.o. female [id number]  PCP:  Bedford Cooks, MD (Tel: [phone] )    Chief complaint:    Chief Complaint   Patient presents with    Dizziness     dizziness and nausea since this am      History of Present Illness:  Rommel Coyle is a 57 y.o. female woke up this morning felt dizzy with the room spinning would occur with sitting or standing but worse with standing. Patient no focal weakness slurred speech numbness has been having palpitations but no chest pain no shortness of breath but has been having chills in the ED patient had stroke work-up which was negative including negative MRI brain. Patient had grade 1 AV block on EKG no arrhythmias on telemetry thus far. Patient does have a history of multinodular goiter goiter with hyperthyroidism started on methimazole roughly 2 weeks ago            REVIEW OF SYSTEMS:   Constitutional: Negative for fever,chills or night sweats  ENT: Negative for rhinorrhea, epistaxis, hoarseness, sore throat. Respiratory: Negative for shortness of breath,wheezing  Cardiovascular: see above  Gastrointestinal: Negative for nausea, vomiting, diarrhea  Genitourinary: Negative for polyuria, dysuria   Hematologic/Lymphatic: Negative for bleeding tendency, easy bruising  Musculoskeletal: Negative for myalgias and arthralgias  Neurologic: Negative for confusion,dysarthria. Skin: Negative for itching,rash  Psychiatric: Negative for depression,anxiety, agitation. Endocrine: Negative for polydipsia,polyuria,heat /cold intolerance. Past Medical History:   has a past medical history of Arthritis, Breast lump or mass, CAD (coronary artery disease), Gallstones, Hyperlipidemia, Hypertension, and Thyroid disease.      Past Surgical History:   has a past surgical history that includes Colonoscopy (8.1.2010); Hysterectomy (2007); Breast surgery (6797,0320,9273); Cystoscopy (7.19.2007); Cholecystectomy, laparoscopic (02/03/2017); skin biopsy; and Tonsillectomy. Medications:  No current facility-administered medications on file prior to encounter. Current Outpatient Medications on File Prior to Encounter   Medication Sig Dispense Refill    methIMAzole (TAPAZOLE) 5 MG tablet Take 1 tablet by mouth daily 30 tablet 4    carvedilol (COREG) 6.25 MG tablet Take 1 tablet by mouth 2 times daily 60 tablet 2    Semaglutide,0.25 or 0.5MG/DOS, (OZEMPIC, 0.25 OR 0.5 MG/DOSE,) 2 MG/1.5ML SOPN 0.25 mg 1 pen 0    umeclidinium-vilanterol (ANORO ELLIPTA) 62.5-25 MCG/INH AEPB inhaler Inhale 1 puff into the lungs daily 1 each 2    rosuvastatin (CRESTOR) 5 MG tablet Take 1 tablet by mouth daily 90 tablet 3    Insulin Pen Needle (B-D UF III MINI PEN NEEDLES) 31G X 5 MM MISC 1 each by Does not apply route daily 100 each 6    lisinopril (PRINIVIL;ZESTRIL) 20 MG tablet Take 1 tablet by mouth 2 times daily 180 tablet 3    hydroCHLOROthiazide (MICROZIDE) 12.5 MG capsule Take 1 capsule by mouth every morning 90 capsule 3    nitroGLYCERIN (NITROSTAT) 0.4 MG SL tablet Place 1 tablet under the tongue every 5 minutes as needed for Chest pain 25 tablet 3    famotidine (PEPCID) 20 MG tablet Take 1 tablet by mouth 2 times daily 60 tablet 3    Cholecalciferol (VITAMIN D) 50 MCG (2000 UT) CAPS capsule Take 2 capsules by mouth daily      clopidogrel (PLAVIX) 75 MG tablet Take 1 tablet by mouth daily 90 tablet 3    potassium chloride (KLOR-CON M) 10 MEQ extended release tablet Take 1 tablet by mouth daily 90 tablet 3    aspirin 81 MG EC tablet Take 81 mg by mouth daily         Allergies:   Allergies   Allergen Reactions    Penicillins Anaphylaxis    Levaquin [Levofloxacin In D5w] Other (See Comments)     Joint pain    Morphine Rash        Social History:  Patient Lives at home   reports that she quit smoking about 15 years ago. Her smoking use included cigarettes. She started smoking about 41 years ago. She has a 54.00 pack-year smoking history. She has never used smokeless tobacco. She reports that she does not drink alcohol and does not use drugs. Family History:  family history includes Cancer (age of onset: 52) in her mother; Cancer (age of onset: 48) in her brother; Cancer (age of onset: 64) in her father; Heart Disease in her father and another family member. ,     Physical Exam:  BP (!) 146/72   Pulse 71   Temp 98 °F (36.7 °C) (Oral)   Resp 20   SpO2 98%     General appearance:  Appears comfortable. AAOx3  HEENT: atraumatic, Pupils equal, muscous membranes moist, no masses appreciated  Cardiovascular: Regular rate and rhythm no murmurs appreciated  Respiratory: CTAB no wheezing  Gastrointestinal: Abdomen soft, non-tender, BS+  EXT: no edema  Neurology: no gross focal deficts  Psychiatry: Appropriate affect. Not agitated  Skin: Warm, dry, no rashes appreciated    Labs:  CBC:   Lab Results   Component Value Date    WBC 6.4 06/22/2022    RBC 4.73 06/22/2022    HGB 13.3 06/22/2022    HCT 40.0 06/22/2022    MCV 84.6 06/22/2022    MCH 28.2 06/22/2022    MCHC 33.3 06/22/2022    RDW 13.3 06/22/2022     06/22/2022    MPV 8.4 06/22/2022     BMP:    Lab Results   Component Value Date     06/22/2022    K 4.0 06/22/2022    K 4.7 02/17/2022     06/22/2022    CO2 28 06/22/2022    BUN 17 06/22/2022    CREATININE <0.5 06/22/2022    CALCIUM 9.7 06/22/2022    GFRAA >60 06/22/2022    GFRAA >60 06/13/2013    LABGLOM >60 06/22/2022    GLUCOSE 110 06/22/2022     MRI BRAIN WO CONTRAST MR WITNESS   Final Result   No acute infarct or other acute intracranial process. Scattered patchy foci of T2/FLAIR hyperintensity are present within   supratentorial white matter which is a nonspecific finding but likely   represents mild chronic microvascular ischemia.       RECOMMENDATIONS:   Unavailable CTA HEAD NECK W CONTRAST   Final Result   1. No apparent arterial high grade stenosis, occlusion or aneurysm within the   head or neck. 2. Mildly enlarged, heterogeneous thyroid gland. Nonemergent thyroid   ultrasound recommended for further evaluation. RECOMMENDATIONS:   Unavailable         CT HEAD WO CONTRAST   Final Result   No acute intracranial abnormality. XR CHEST PORTABLE   Final Result   No acute cardiopulmonary disease. Recent imaging reviewed    Problem List  Principal Problem:    Vertigo  Resolved Problems:    * No resolved hospital problems. *        Assessment/Plan:   Vertigo  - mri brain negative  - pt ot eval  - meclizine    Palpitations  - cards consult    Cad s/p stent 11/21  - home asa and plavix    Hyperthyroidism: home meds check tsh        DVT prophylaxis lovenox  Code status full code      Please note that some part of this chart was generated using Dragon dictation software. Although every effort was made to ensure the accuracy of this automated transcription, some errors in transcription may have occurred inadvertently. If you may need any clarification, please do not hesitate to contact me through [facility].        Sudhakar Giraldo MD    6/22/2022 2:55 PM

## 2022-06-22 NOTE — ED NOTES
Pt. Transported to  per RN, bedside report to [employer].       Bryant Cha RN  06/22/22 6421

## 2022-06-23 VITALS
SYSTOLIC BLOOD PRESSURE: 143 MMHG | BODY MASS INDEX: 31.47 KG/M2 | OXYGEN SATURATION: 95 % | HEART RATE: 58 BPM | RESPIRATION RATE: 16 BRPM | TEMPERATURE: 97.7 F | HEIGHT: 64 IN | WEIGHT: 184.3 LBS | DIASTOLIC BLOOD PRESSURE: 79 MMHG

## 2022-06-23 LAB
ANION GAP SERPL CALCULATED.3IONS-SCNC: 9 MMOL/L (ref 3–16)
BASOPHILS ABSOLUTE: 0 K/UL (ref 0–0.2)
BASOPHILS RELATIVE PERCENT: 0.5 %
BUN BLDV-MCNC: 15 MG/DL (ref 7–20)
CALCIUM SERPL-MCNC: 9.3 MG/DL (ref 8.3–10.6)
CHLORIDE BLD-SCNC: 104 MMOL/L (ref 99–110)
CO2: 28 MMOL/L (ref 21–32)
CREAT SERPL-MCNC: <0.5 MG/DL (ref 0.6–1.2)
EOSINOPHILS ABSOLUTE: 0.2 K/UL (ref 0–0.6)
EOSINOPHILS RELATIVE PERCENT: 3.5 %
GFR AFRICAN AMERICAN: >60
GFR NON-AFRICAN AMERICAN: >60
GLUCOSE BLD-MCNC: 89 MG/DL (ref 70–99)
HCT VFR BLD CALC: 38.8 % (ref 36–48)
HEMOGLOBIN: 12.8 G/DL (ref 12–16)
LYMPHOCYTES ABSOLUTE: 2 K/UL (ref 1–5.1)
LYMPHOCYTES RELATIVE PERCENT: 30 %
MAGNESIUM: 2.1 MG/DL (ref 1.8–2.4)
MCH RBC QN AUTO: 27.7 PG (ref 26–34)
MCHC RBC AUTO-ENTMCNC: 32.9 G/DL (ref 31–36)
MCV RBC AUTO: 84.1 FL (ref 80–100)
MONOCYTES ABSOLUTE: 0.5 K/UL (ref 0–1.3)
MONOCYTES RELATIVE PERCENT: 7.9 %
NEUTROPHILS ABSOLUTE: 3.8 K/UL (ref 1.7–7.7)
NEUTROPHILS RELATIVE PERCENT: 58.1 %
PDW BLD-RTO: 13 % (ref 12.4–15.4)
PLATELET # BLD: 246 K/UL (ref 135–450)
PMV BLD AUTO: 7.9 FL (ref 5–10.5)
POTASSIUM REFLEX MAGNESIUM: 3.3 MMOL/L (ref 3.5–5.1)
RBC # BLD: 4.62 M/UL (ref 4–5.2)
SODIUM BLD-SCNC: 141 MMOL/L (ref 136–145)
T3 TOTAL: 1.45 NG/ML (ref 0.8–2)
T4 FREE: 1.2 NG/DL (ref 0.9–1.8)
TSH REFLEX: 0.07 UIU/ML (ref 0.27–4.2)
WBC # BLD: 6.5 K/UL (ref 4–11)

## 2022-06-23 PROCEDURE — 80048 BASIC METABOLIC PNL TOTAL CA: CPT

## 2022-06-23 PROCEDURE — G0378 HOSPITAL OBSERVATION PER HR: HCPCS

## 2022-06-23 PROCEDURE — 6370000000 HC RX 637 (ALT 250 FOR IP): Performed by: INTERNAL MEDICINE

## 2022-06-23 PROCEDURE — 85025 COMPLETE CBC W/AUTO DIFF WBC: CPT

## 2022-06-23 PROCEDURE — 36415 COLL VENOUS BLD VENIPUNCTURE: CPT

## 2022-06-23 PROCEDURE — 97161 PT EVAL LOW COMPLEX 20 MIN: CPT

## 2022-06-23 PROCEDURE — 83735 ASSAY OF MAGNESIUM: CPT

## 2022-06-23 RX ADMIN — CLOPIDOGREL BISULFATE 75 MG: 75 TABLET ORAL at 09:42

## 2022-06-23 RX ADMIN — LISINOPRIL 20 MG: 20 TABLET ORAL at 09:42

## 2022-06-23 ASSESSMENT — PAIN SCALES - GENERAL
PAINLEVEL_OUTOF10: 0

## 2022-06-23 NOTE — PROGRESS NOTES
Data- discharge order received, pt verbalized agreement to discharge, disposition to previous residence, no needs for HHC/DME. Action- discharge instructions prepared and given to pt, pt verbalized understanding. Medication information packet given r/t NEW and/or CHANGED prescriptions emphasizing name/purpose/side effects, pt verbalized understanding. Discharge instruction summary: Diet- general, Activity- as abhilash, Primary Care Physician as follows: Carl Bland [phone] f/u appointment to be scheduled in te next week, immunizations reviewed and current, no new prescription medications. 1. WEIGHT: Admit Weight: 184 lb 4.8 oz (83.6 kg) (06/22/22 2026)        Today  Weight: 184 lb 4.8 oz (83.6 kg) (06/22/22 2026)       2. O2 SAT.: SpO2: 95 % (06/23/22 1105)    Response- Pt belongings gathered, IV removed. Disposition is home (no HHC/DME needs), transported with papers and belongings, taken to lobby via w/c w/ PCA, no complications.

## 2022-06-23 NOTE — FLOWSHEET NOTE
Pt awake at shift change, in bed, denies pain. VSS at 2026. Obtained orthostatic VS; pt c/o some dizziness temporarily when first moving from supine to sitting/dangle position in bed. Admission not done upon pt arrival to [facility]. See all flowsheets. Will continue to monitor. Admission questions done & orders reviewed & d/w pt at shift change. Cardiology ordered to see pt.

## 2022-06-23 NOTE — PLAN OF CARE
Problem: Cardiovascular - Adult  Goal: Maintains optimal cardiac output and hemodynamic stability  Outcome: Progressing     Vitals:    06/23/22 0427   BP: 138/66   Pulse: 71   Resp: 14   Temp: 98 °F (36.7 °C)   SpO2: 93%     VSS overnight. Pt denies pain. See all flowsheets. Will continue to monitor.

## 2022-06-23 NOTE — CARE COORDINATION
SW reviewed pt's chart. Patient was seen by PT/OT today and found to have no therapy or DME needs. No other discharge needs have been identified at this time.     Electronically signed by AMALIA Little LSW on 6/23/2022 at 3:10 PM

## 2022-06-23 NOTE — PROGRESS NOTES
[facility]   Phone: [phone]    Physical Therapy    [x] Initial Evaluation            [] Daily Treatment Note         [x] Discharge Summary      Patient: Yen Ward   : 1960   MRN: [id number]   Date of Service:  2022  Admitting Diagnosis: Vertigo  Current Admission Summary: Yen Ward is a 57 y.o. female woke up this morning felt dizzy with the room spinning would occur with sitting or standing but worse with standing. Past Medical History:  has a past medical history of Arthritis, Breast lump or mass, CAD (coronary artery disease), Gallstones, Hyperlipidemia, Hypertension, and Thyroid disease. Past Surgical History:  has a past surgical history that includes Colonoscopy (2010); Hysterectomy (); Breast surgery (8184,4705,7499); Cystoscopy (2007); Cholecystectomy, laparoscopic (2017); skin biopsy; and Tonsillectomy. Discharge Recommendations: Yen Ward scored a 24/24 on the AM-PAC short mobility form. At this time, no further PT is recommended upon discharge due to patient at independent level. Recommend patient returns to prior setting with prior services. DME Required For Discharge: No new DME required  Precautions/Restrictions: low fall risk    Pre-Admission Information   Lives With: family, .   Comment: spouse and handicapped daughter (requires complete care)    Type of Home: house  Home Layout: one level  Home Access: 1 step to enter without rails   Bathroom Layout: walk in shower  Toilet Height: standard height  Bathroom Equipment: grab bars in shower, shower chair, hand held shower head  Home Equipment: rolling walker, manual wheelchair  Transfer Assistance: Independent without use of device  Ambulation Assistance:Independent without use of device  ADL Assistance: independent with all ADL's  IADL Assistance: independent with homemaking tasks  Active :        [x] Yes  [] No  Hand Dominance: [x] Left  [] Right  Hobbies: grandchildren  Recent Falls: Pt reports no recent falls. Pt states HHA stops by to assist with pt's daughter's needs as well. Examination   Vision:   Vision Gross Assessment: Impaired and Vision Corrective Device: wears glasses for reading  Hearing:   WFL  Posture:   good  Sensation:   WFL   ROM:   (B) LE ROM WFL  Strength:   (B) LE gross strength WFL  Decision Making: low complexity  Clinical Presentation: stable      Subjective  General: Pt seated EOB upon arrival. Agreeable to PT eval.   Pain: 0/10  Pain Interventions: not applicable       Functional Mobility  Bed Mobility  Bed mobility not completed on this date. Comments:  Transfers  Sit to stand transfer: Independent  Stand to sit transfer: Independent  Comments:  Ambulation  Surface:level surface  Assistive Device: no device  Assistance: Independent  Distance: 300'  Gait Mechanics: appropriate anil, appropriate Tyler, no LOB  Comments:    Stair Mobility  Stair mobility not completed on this date.   Comments:  Balance  Static Sitting Balance: good(+): independent with high level dynamic balance in unsupported position  Dynamic Sitting Balance: good(+): independent with high level dynamic balance in unsupported position  Static Standing Balance: good(+): independent with high level dynamic balance in unsupported position  Dynamic Standing Balance: good(+): independent with high level dynamic balance in unsupported position  Comments:    Other Therapeutic Interventions    Functional Outcomes  AM-PAC Inpatient Mobility Raw Score : 24              Cognition  WFL  Orientation:    A&O x 4    Education  Barriers To Learning: none  Patient Education: patient educated on PT role and benefits, discharge recommendations  Learning Assessment:  Pt verbalized and demonstrates understanding    Assessment  Impairments Requiring Therapeutic Intervention: none - eval with same day discharge  Prognosis: good without need for therapy intervention  Clinical Assessment: Patient presenting at independent level for completion of required mobility tasks for return to home. Eval with d/c at this time. No therapy services indicated. Safety Interventions: patient left in bed and call light within reach    Plan  Frequency: Eval with same day discharge. No follow up required. Current Treatment Recommendations: Not applicable, evaluation completed with same day discharge. Goals  Patient eval with same day discharge. No goals set as patient is at baseline mobility status.       Therapy Session Time      Individual Group Co-treatment   Time In     0358   Time Out     1158   Minutes     10     Timed Code Treatment Minutes:    0  Total Treatment Minutes:  10       Electronically Signed By: Yanira Varner, [address] [place] [address],[address], [facility] [id number]

## 2022-06-23 NOTE — PROGRESS NOTES
Occupational Therapy    No OT needs required at this time, patient I.  Discharge from caseload, Thank you,  Heriberto Melchor OTR/L [id number]

## 2022-07-01 ENCOUNTER — TELEPHONE (OUTPATIENT)
Dept: ENDOCRINOLOGY | Age: 62
End: 2022-07-01

## 2022-07-01 ENCOUNTER — PATIENT MESSAGE (OUTPATIENT)
Dept: ENDOCRINOLOGY | Age: 62
End: 2022-07-01

## 2022-07-01 DIAGNOSIS — E66.9 OBESITY, UNSPECIFIED CLASSIFICATION, UNSPECIFIED OBESITY TYPE, UNSPECIFIED WHETHER SERIOUS COMORBIDITY PRESENT: ICD-10-CM

## 2022-07-01 RX ORDER — SEMAGLUTIDE 1.34 MG/ML
INJECTION, SOLUTION SUBCUTANEOUS
Qty: 4.5 ML | Refills: 1 | Status: SHIPPED | OUTPATIENT
Start: 2022-07-01

## 2022-07-01 NOTE — TELEPHONE ENCOUNTER
From: Santy Mercer  To: Dr. Ronen Segovia  Sent: 7/1/2022 8:30 AM EDT  Subject: Ozempic and Methimazole    Two questions-  Per my last visit I was to starting using . 50 mg of Ozempic. I will need a new prescription with the increased dose as I only have one more injection. I went to the ER last week due to extreme dizziness, nausea and palpitations. They kept me for observation overnight. Although they couldn't find a specific reason-the only thing that had changed was I started taking my methimazole 5 days before. Based on that they had me discontinue my methimazole and hydrochlorothiazide. They advised I should follow up with you since you prescribed the methimazole. I have been totally fine since not taking these two medications. What are your thoughts?     Thank Minnie Dobbs

## 2022-07-01 NOTE — TELEPHONE ENCOUNTER
[employer] message sent from patient:    I went to the ER last week due to extreme dizziness, nausea and palpitations. They kept me for observation overnight. Although they couldn't find a specific reason-the only thing that had changed was I started taking my methimazole 5 days before. Based on that they had me discontinue my methimazole and hydrochlorothiazide. They advised I should follow up with you since you prescribed the methimazole. I have been totally fine since not taking these two medications.  What are your thoughts?     Thank Angela Tolbert

## 2022-07-06 ENCOUNTER — OFFICE VISIT (OUTPATIENT)
Dept: FAMILY MEDICINE CLINIC | Age: 62
End: 2022-07-06
Payer: COMMERCIAL

## 2022-07-06 VITALS
BODY MASS INDEX: 31.07 KG/M2 | HEIGHT: 64 IN | HEART RATE: 76 BPM | WEIGHT: 182 LBS | OXYGEN SATURATION: 97 % | SYSTOLIC BLOOD PRESSURE: 130 MMHG | DIASTOLIC BLOOD PRESSURE: 74 MMHG

## 2022-07-06 DIAGNOSIS — E78.2 MIXED HYPERLIPIDEMIA: Chronic | ICD-10-CM

## 2022-07-06 DIAGNOSIS — G47.33 OSA ON CPAP: Chronic | ICD-10-CM

## 2022-07-06 DIAGNOSIS — I10 ESSENTIAL HYPERTENSION: Chronic | ICD-10-CM

## 2022-07-06 DIAGNOSIS — Z99.89 OSA ON CPAP: Chronic | ICD-10-CM

## 2022-07-06 DIAGNOSIS — I25.10 CORONARY ARTERY DISEASE INVOLVING NATIVE CORONARY ARTERY OF NATIVE HEART WITHOUT ANGINA PECTORIS: Chronic | ICD-10-CM

## 2022-07-06 DIAGNOSIS — R42 VERTIGO: ICD-10-CM

## 2022-07-06 DIAGNOSIS — Z09 HOSPITAL DISCHARGE FOLLOW-UP: Primary | ICD-10-CM

## 2022-07-06 PROCEDURE — 1111F DSCHRG MED/CURRENT MED MERGE: CPT | Performed by: FAMILY MEDICINE

## 2022-07-06 PROCEDURE — 99214 OFFICE O/P EST MOD 30 MIN: CPT | Performed by: FAMILY MEDICINE

## 2022-07-06 NOTE — PROGRESS NOTES
Post-Discharge Transitional Care  Follow Up      [facility]   YOB: 1960    Date of Office Visit:  7/6/2022  Date of Hospital Admission: 6/22/22  Date of Hospital Discharge: 6/23/22  Risk of hospital readmission (high >=14%. Medium >=10%) :No data recorded    Care management risk score Rising risk (score 2-5) and Complex Care (Scores >=6): 6     Non face to face  following discharge, date last encounter closed (first attempt may have been earlier): *No documented post hospital discharge outreach found in the last 14 days    Call initiated 2 business days of discharge: *No response recorded in the last 14 days    ASSESSMENT/PLAN:   Hospital discharge follow-up  Healthy diet, stay  -     CO DISCHARGE MEDS RECONCILED W/ CURRENT OUTPATIENT MED LIST  Jacky  Has elected to stay off methimazole until she sees thyroid specialist at [address] [place] [address] hypertension  Stable continue meds, off HCTZ    ARLENE on CPAP  Helping her feel better and her son, continue using    Mixed hyperlipidemia  Stable, continue statin, rosuvastatin 5 mg    Coronary artery disease involving native coronary artery of native heart without angina pectoris  Stable continue meds including Coreg 6.25 mg twice daily aspirin and statin      Medical Decision Making: moderate complexity  No follow-ups on file. Subjective:   HPI:  Follow up of Hospital problems/diagnosis(es): Vertigo    Inpatient course: Discharge summary reviewed- see chart.     Interval history/Current status: Stable    Patient Active Problem List   Diagnosis    Hyperlipidemia    Non morbid obesity, unspecified obesity type    Essential hypertension    Breast lump or mass    Family history of lung cancer    Family history of essential hypertension    Multinodular goiter    Coronary artery disease involving native coronary artery of native heart without angina pectoris    Pulmonary hypertension (HCC)    ARLENE on CPAP    Olecranon bursitis of right elbow  COVID-19 virus infection    Obstructive airway disease ([facility] [address]    Vertigo       Medications listed as ordered at the time of discharge from hospital     Medication List          Accurate as of July 6, 2022  2:13 PM. If you have any questions, ask your nurse or doctor. CHANGE how you take these medications    famotidine 20 MG tablet  Commonly known as: Pepcid  Take 1 tablet by mouth 2 times daily  What changed: when to take this        CONTINUE taking these medications    aspirin 81 MG EC tablet     B-D UF III MINI PEN NEEDLES 31G X 5 MM Misc  Generic drug: Insulin Pen Needle  1 each by Does not apply route daily     carvedilol 6.25 MG tablet  Commonly known as: COREG  Take 1 tablet by mouth 2 times daily     clopidogrel 75 MG tablet  Commonly known as: Plavix  Take 1 tablet by mouth daily     lisinopril 20 MG tablet  Commonly known as: PRINIVIL;ZESTRIL  Take 1 tablet by mouth 2 times daily     nitroGLYCERIN 0.4 MG SL tablet  Commonly known as: Nitrostat  Place 1 tablet under the tongue every 5 minutes as needed for Chest pain     Ozempic (0.25 or 0.5 MG/DOSE) 2 MG/1.5ML Sopn  Generic drug: Semaglutide(0.25 or 0.5MG/DOS)  Inject 0.5 mg into the skin weekly. potassium chloride 10 MEQ extended release tablet  Commonly known as: KLOR-CON M  Take 1 tablet by mouth daily     rosuvastatin 5 MG tablet  Commonly known as: CRESTOR  Take 1 tablet by mouth daily     umeclidinium-vilanterol 62.5-25 MCG/INH Aepb inhaler  Commonly known as: ANORO ELLIPTA  Inhale 1 puff into the lungs daily     vitamin D 50 MCG (2000 UT) Caps capsule              Medications marked \"taking\" at this time  Outpatient Medications Marked as Taking for the 7/6/22 encounter (Office Visit) with Danae Benjamin MD   Medication Sig Dispense Refill    Semaglutide,0.25 or 0.5MG/DOS, (OZEMPIC, 0.25 OR 0.5 MG/DOSE,) 2 MG/1.5ML SOPN Inject 0.5 mg into the skin weekly.  4.5 mL 1    carvedilol (COREG) 6.25 MG tablet Take 1 tablet by mouth 2 times daily 60 tablet 2    umeclidinium-vilanterol (ANORO ELLIPTA) 62.5-25 MCG/INH AEPB inhaler Inhale 1 puff into the lungs daily 1 each 2    rosuvastatin (CRESTOR) 5 MG tablet Take 1 tablet by mouth daily 90 tablet 3    Insulin Pen Needle (B-D UF III MINI PEN NEEDLES) 31G X 5 MM MISC 1 each by Does not apply route daily 100 each 6    lisinopril (PRINIVIL;ZESTRIL) 20 MG tablet Take 1 tablet by mouth 2 times daily 180 tablet 3    nitroGLYCERIN (NITROSTAT) 0.4 MG SL tablet Place 1 tablet under the tongue every 5 minutes as needed for Chest pain 25 tablet 3    famotidine (PEPCID) 20 MG tablet Take 1 tablet by mouth 2 times daily (Patient taking differently: Take 20 mg by mouth nightly ) 60 tablet 3    Cholecalciferol (VITAMIN D) 50 MCG (2000 UT) CAPS capsule Take 2 capsules by mouth daily      clopidogrel (PLAVIX) 75 MG tablet Take 1 tablet by mouth daily 90 tablet 3    potassium chloride (KLOR-CON M) 10 MEQ extended release tablet Take 1 tablet by mouth daily 90 tablet 3    aspirin 81 MG EC tablet Take 81 mg by mouth daily          Medications patient taking as of now reconciled against medications ordered at time of hospital discharge: Yes    A comprehensive review of systems was negative except for what was noted in the HPI.     Objective:    /74   Pulse 76   Ht 5' 4\" (1.626 m)   Wt 182 lb (82.6 kg)   SpO2 97%   BMI 31.24 kg/m²   General Appearance: alert and oriented to person, place and time, well developed and well- nourished, in no acute distress  Skin: warm and dry, no rash or erythema  Head: normocephalic and atraumatic  Eyes: pupils equal, round, and reactive to light, extraocular eye movements intact, conjunctivae normal  ENT: tympanic membrane, external ear and ear canal normal bilaterally, nose without deformity, nasal mucosa and turbinates normal without polyps  Neck: supple and non-tender without mass, no thyromegaly or thyroid nodules, no cervical lymphadenopathy  Pulmonary/Chest: clear to auscultation bilaterally- no wheezes, rales or rhonchi, normal air movement, no respiratory distress  Cardiovascular: normal rate, regular rhythm, normal S1 and S2, no murmurs, rubs, clicks, or gallops, distal pulses intact, no carotid bruits  Abdomen: soft, non-tender, non-distended, normal bowel sounds, no masses or organomegaly  Extremities: no cyanosis, clubbing or edema  Musculoskeletal: normal range of motion, no joint swelling, deformity or tenderness  Neurologic: reflexes normal and symmetric, no cranial nerve deficit, gait, coordination and speech normal      An electronic signature was used to authenticate this note.   --Nia Laboy MD

## 2022-07-14 RX ORDER — FAMOTIDINE 20 MG/1
TABLET, FILM COATED ORAL
Qty: 180 TABLET | Refills: 3 | Status: SHIPPED | OUTPATIENT
Start: 2022-07-14

## 2022-07-21 ENCOUNTER — TELEPHONE (OUTPATIENT)
Dept: ENDOCRINOLOGY | Age: 62
End: 2022-07-21

## 2022-08-08 ENCOUNTER — HOSPITAL ENCOUNTER (OUTPATIENT)
Dept: NUCLEAR MEDICINE | Age: 62
Discharge: HOME | End: 2022-08-08
Payer: COMMERCIAL

## 2022-08-08 DIAGNOSIS — E05.20 TOXIC MULTINODULAR GOITER: ICD-10-CM

## 2022-08-08 DIAGNOSIS — E05.90 HYPERTHYROIDISM: ICD-10-CM

## 2022-08-08 PROCEDURE — 3430000000 HC RX DIAGNOSTIC RADIOPHARMACEUTICAL: Performed by: OTOLARYNGOLOGY

## 2022-08-08 PROCEDURE — A9516 IODINE I-123 SOD IODIDE MIC: HCPCS | Performed by: OTOLARYNGOLOGY

## 2022-08-08 PROCEDURE — 78014 THYROID IMAGING W/BLOOD FLOW: CPT

## 2022-08-08 RX ORDER — SODIUM IODIDE I 123 100 UCI/1
292 CAPSULE, GELATIN COATED ORAL ONCE
Status: COMPLETED | OUTPATIENT
Start: 2022-08-08 | End: 2022-08-08

## 2022-08-08 RX ADMIN — SODIUM IODIDE I 123 292 MICRO CURIE: 100 CAPSULE, GELATIN COATED ORAL at 09:23

## 2022-08-09 ENCOUNTER — HOSPITAL ENCOUNTER (OUTPATIENT)
Dept: NUCLEAR MEDICINE | Age: 62
Discharge: HOME | End: 2022-08-09

## 2022-08-11 ENCOUNTER — TELEPHONE (OUTPATIENT)
Dept: ENDOCRINOLOGY | Age: 62
End: 2022-08-11

## 2022-08-11 RX ORDER — CARVEDILOL 6.25 MG/1
6.25 TABLET ORAL 2 TIMES DAILY
Qty: 180 TABLET | Refills: 3 | Status: SHIPPED | OUTPATIENT
Start: 2022-08-11

## 2022-08-11 NOTE — TELEPHONE ENCOUNTER
Medication:   Requested Prescriptions     Pending Prescriptions Disp Refills    carvedilol (COREG) 6.25 MG tablet 60 tablet 2     Sig: Take 1 tablet by mouth in the morning and 1 tablet before bedtime. Last Filled:  5/18/2022    Patient Phone Number: [phone] (home)     Last appt: 7/6/2022   Next appt: Visit date not found    Last OARRS: No flowsheet data found.

## 2022-08-11 NOTE — TELEPHONE ENCOUNTER
I called the patient back to discuss radioactive iodine ablation for subclinical hyperthyroidism. She did not  the phone and left a message we can discuss this option on Monday I can call her back.

## 2022-08-11 NOTE — TELEPHONE ENCOUNTER
[employer] message sent from patient:    Hi Dr Hamzah Crabtree had my test performed this week and was contacted by Dr. Akira Zamorano office. He feels I am a candidate for the KIRAN and advised that you would be able to take care of this within the [address]. Could you please tell me what my next steps are.      Thanks  Kate

## 2022-08-15 ENCOUNTER — TELEPHONE (OUTPATIENT)
Dept: ENDOCRINOLOGY | Age: 62
End: 2022-08-15

## 2022-08-15 DIAGNOSIS — E04.2 MULTINODULAR GOITER: Primary | ICD-10-CM

## 2022-08-15 RX ORDER — METHIMAZOLE 5 MG/1
TABLET ORAL
Qty: 30 TABLET | Refills: 3 | Status: SHIPPED | OUTPATIENT
Start: 2022-08-15 | End: 2022-09-28 | Stop reason: ALTCHOICE

## 2022-08-15 NOTE — TELEPHONE ENCOUNTER
Fax from [facility] w/ refill request for Methimazole 5mg tabs    LOV    6-1-22  FOV    10-18-22

## 2022-09-20 NOTE — PATIENT INSTRUCTIONS
OK to stop taking plavix 7 days before your surgery, you do not have to restart it after. Must continue your aspirin throughout. If your chest pain continues or becomes bothersome, let us know and we can try a medication called isosorbide that may help.

## 2022-09-20 NOTE — ASSESSMENT & PLAN NOTE
Angina~   CCS class~  Intervention  [facility]~ 11/16/21 PCI to RCA  Echo~ 9/30/21 EF 55-60%  Stress~ 11/2021 abnormal -> [facility]  2/2022 low risk scan with hypertensive response to exercise  Current meds~ plavix / asa  Plan~ OK to return to cardiac rehab  DAPT for 12 months

## 2022-09-28 ENCOUNTER — OFFICE VISIT (OUTPATIENT)
Dept: CARDIOLOGY CLINIC | Age: 62
End: 2022-09-28
Payer: COMMERCIAL

## 2022-09-28 VITALS
HEIGHT: 64 IN | WEIGHT: 172.1 LBS | OXYGEN SATURATION: 98 % | DIASTOLIC BLOOD PRESSURE: 80 MMHG | HEART RATE: 55 BPM | BODY MASS INDEX: 29.38 KG/M2 | SYSTOLIC BLOOD PRESSURE: 138 MMHG

## 2022-09-28 DIAGNOSIS — I25.10 CORONARY ARTERY DISEASE INVOLVING NATIVE CORONARY ARTERY OF NATIVE HEART WITHOUT ANGINA PECTORIS: Chronic | ICD-10-CM

## 2022-09-28 DIAGNOSIS — E78.2 MIXED HYPERLIPIDEMIA: Chronic | ICD-10-CM

## 2022-09-28 DIAGNOSIS — I10 ESSENTIAL HYPERTENSION: Chronic | ICD-10-CM

## 2022-09-28 PROCEDURE — 99214 OFFICE O/P EST MOD 30 MIN: CPT | Performed by: INTERNAL MEDICINE

## 2022-09-30 ENCOUNTER — TELEPHONE (OUTPATIENT)
Dept: PULMONOLOGY | Age: 62
End: 2022-09-30

## 2022-10-14 DIAGNOSIS — E04.2 MULTINODULAR GOITER: ICD-10-CM

## 2022-10-14 DIAGNOSIS — E04.1 THYROID NODULE: ICD-10-CM

## 2022-10-14 LAB
A/G RATIO: 2 (ref 1.1–2.2)
ALBUMIN SERPL-MCNC: 4.5 G/DL (ref 3.4–5)
ALP BLD-CCNC: 62 U/L (ref 40–129)
ALT SERPL-CCNC: 12 U/L (ref 10–40)
ANION GAP SERPL CALCULATED.3IONS-SCNC: 9 MMOL/L (ref 3–16)
AST SERPL-CCNC: 14 U/L (ref 15–37)
BILIRUB SERPL-MCNC: 0.7 MG/DL (ref 0–1)
BUN BLDV-MCNC: 16 MG/DL (ref 7–20)
CALCIUM SERPL-MCNC: 9.7 MG/DL (ref 8.3–10.6)
CHLORIDE BLD-SCNC: 105 MMOL/L (ref 99–110)
CHOLESTEROL, TOTAL: 148 MG/DL (ref 0–199)
CO2: 29 MMOL/L (ref 21–32)
CREAT SERPL-MCNC: <0.5 MG/DL (ref 0.6–1.2)
ESTIMATED AVERAGE GLUCOSE: 116.9 MG/DL
GFR AFRICAN AMERICAN: >60
GFR NON-AFRICAN AMERICAN: >60
GLUCOSE BLD-MCNC: 100 MG/DL (ref 70–99)
HBA1C MFR BLD: 5.7 %
HDLC SERPL-MCNC: 62 MG/DL (ref 40–60)
LDL CHOLESTEROL CALCULATED: 75 MG/DL
POTASSIUM SERPL-SCNC: 5 MMOL/L (ref 3.5–5.1)
SODIUM BLD-SCNC: 143 MMOL/L (ref 136–145)
T3 TOTAL: 1.56 NG/ML (ref 0.8–2)
T4 FREE: 1.4 NG/DL (ref 0.9–1.8)
TOTAL PROTEIN: 6.7 G/DL (ref 6.4–8.2)
TRIGL SERPL-MCNC: 55 MG/DL (ref 0–150)
TSH SERPL DL<=0.05 MIU/L-ACNC: 0.02 UIU/ML (ref 0.27–4.2)
VLDLC SERPL CALC-MCNC: 11 MG/DL

## 2022-10-18 ENCOUNTER — OFFICE VISIT (OUTPATIENT)
Dept: ENDOCRINOLOGY | Age: 62
End: 2022-10-18
Payer: COMMERCIAL

## 2022-10-18 DIAGNOSIS — E78.2 MIXED HYPERLIPIDEMIA: Chronic | ICD-10-CM

## 2022-10-18 DIAGNOSIS — Z78.0 POSTMENOPAUSAL: ICD-10-CM

## 2022-10-18 DIAGNOSIS — R73.03 PREDIABETES: Primary | ICD-10-CM

## 2022-10-18 DIAGNOSIS — E05.90 HYPERTHYROIDISM: ICD-10-CM

## 2022-10-18 DIAGNOSIS — E04.2 MULTINODULAR GOITER: ICD-10-CM

## 2022-10-18 PROCEDURE — 99215 OFFICE O/P EST HI 40 MIN: CPT | Performed by: INTERNAL MEDICINE

## 2022-10-18 RX ORDER — CLOPIDOGREL BISULFATE 75 MG/1
75 TABLET ORAL DAILY
COMMUNITY

## 2022-10-18 NOTE — PROGRESS NOTES
Martine Ortiz is a 57 y.o. female who is here for management  Of obesity, prediabetes and thyroid nodule. Patient has a PMH of HTN, breast lump, hyperlipidemia. Patient had Thyroid US done in  06/20 which showed nodules   In right lobe , dominant nodule measures 1.2 cm solid . In left lobe , dominant nodule measures 2.8 cm in mid pole and 1.8 cm in lower pole. Occasional difficulty swallowing. Sister has h/o multinodular goiter and had recent surgery. There was no thyroid cancer diagnosed in the family  No FH of thyroid cancer  No History of exposure to radiation as a child. She has strong FH of DM  She has tried different diets . Unable to stay on diet long term. 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 She had Covid in jan 2022 which was mild       INTERIM    she overall feels better since started Ozempic. She did not realize but she has lost approximately 12 pounds in the last few months. Denies any issues with swallowing or choking. Does complain of palpitations          Allergies   Allergen Reactions    Penicillins Anaphylaxis    Levaquin [Levofloxacin In D5w] Other (See Comments)     Joint pain    Methimazole Dizziness or Vertigo, Hives and Palpitations    Morphine Rash     Outpatient Medications Marked as Taking for the 10/18/22 encounter (Office Visit) with Kartik Weeks MD   Medication Sig Dispense Refill    clopidogrel (PLAVIX) 75 MG tablet Take 75 mg by mouth daily      carvedilol (COREG) 6.25 MG tablet Take 1 tablet by mouth in the morning and 1 tablet before bedtime. 180 tablet 3    famotidine (PEPCID) 20 MG tablet Take 1 tablet by mouth twice daily 180 tablet 3    Semaglutide,0.25 or 0.5MG/DOS, (OZEMPIC, 0.25 OR 0.5 MG/DOSE,) 2 MG/1.5ML SOPN Inject 0.5 mg into the skin weekly.  4.5 mL 1 [Takes medication as prescribed] : takes rosuvastatin (CRESTOR) 5 MG tablet Take 1 tablet by mouth daily 90 tablet 3    lisinopril (PRINIVIL;ZESTRIL) 20 MG tablet Take 1 tablet by mouth 2 times daily 180 tablet 3    nitroGLYCERIN (NITROSTAT) 0.4 MG SL tablet Place 1 tablet under the tongue every 5 minutes as needed for Chest pain 25 tablet 3    Cholecalciferol (VITAMIN D) 50 MCG (2000 UT) CAPS capsule Take 2 capsules by mouth daily      aspirin 81 MG EC tablet Take 81 mg by mouth daily           Vitals:    10/18/22 1305   BP: (!) 141/73   Pulse: 63   Resp: 16   Weight: 168 lb (76.2 kg)   Height: 5' 4.25\" (1.632 m)       Past Medical History:   Diagnosis Date    Arthritis     R KNEE-EVENTUALLY NEEDS RTKR    Breast lump or mass     CAD (coronary artery disease)     Gallstones     Hyperlipidemia     Hypertension     Thyroid disease     MULTINODULAR-6/2020     Past Surgical History:   Procedure Laterality Date    BREAST SURGERY  [id number],[id number]    CHOLECYSTECTOMY, LAPAROSCOPIC  02/03/2017    COLONOSCOPY  8.1.2010    CYSTOSCOPY  7.19.2007    HYSTERECTOMY (CERVIX STATUS UNKNOWN)  2007    SKIN BIOPSY      UPPER CHEST MOLE BX-2010    TONSILLECTOMY      AGE 22 YRS. Family History   Problem Relation Age of Onset    Cancer Mother 52        UTERINE AND LUNG, SMOKER    Cancer Father 64        THROAT CANCER    Heart Disease Father     Cancer Brother 48        THROAT    Heart Disease Other      Social History     Tobacco Use   Smoking Status Former    Packs/day: 2.00    Years: 27.00    Pack years: 54.00    Types: Cigarettes    Start date: 1/4/1981    Quit date: 1/1/2007    Years since quitting: 15.8   Smokeless Tobacco Never      Social History     Substance and Sexual Activity   Alcohol Use Never    Alcohol/week: 0.0 standard drinks         ROS  I have reviewed the review of system questionnaire filled by the patient .   Patient was advised to contact PCP for non endocrine signs and symptoms     EXAM   Constitutional: no acute distress, well appearing, well [None] : Patient does not have any barriers to medication adherence nourished  Psychiatric: oriented to person, place and time, judgement, insight and normal, recent and remote memory and intact and mood, affect are normal  Skin: skin and subcutaneous tissue is normal without mass,   Head and Face: examination of head and face revealed no abnormalities  Eyes: no lid or conjunctival swelling, no erythema or discharge, pupils are normal,   Ears/Nose: external inspection of ears and nose revealed no abnormalities, hearing is grossly normal  Oropharynx/Mouth/Face: lips, tongue and gums are normal with no lesions, the voice quality was normal  Neck: neck is supple and symmetric, with midline trachea and no masses, thyroid is enlarged    Pulmonary: no increased work of breathing or signs of respiratory distress, lungs are clear to auscultation  Cardiovascular: normal heart rate and rhythm, normal S1 and S2,   Musculoskeletal: normal gait and station,   Neurological: normal coordination, normal general cortical function    Lab Results   Component Value Date    TSHFT4 0.88 12/05/2016    TSH 0.02 (L) 10/14/2022       Assessment/Plan    ---- TOXIC MULTINODULAR GOITER   She has  subclinical hyperthyroidism  with most recent TSH value of 0.04 in May 2022. Patient tried Tapazole on 2 different occasions and ended up with side effects and both time had a rash on the arm but then she also had dizzy spells which she attributed to Tapazole. She is accompanied by her  and we had a lengthy discussion about future options for the toxic multinodular goiter. For her subclinical hyperthyroidism she and her  decided that they would like to retry Tapazole with taking Benadryl. She did not had any trouble breathing with the Tapazole. I also offered her to switch to propylthiouracil but she would like to try Tapazole 1 more time.   We had discussed surgical options versus radioactive iodine ablation in great detail with the patient at this stage when her repeat thyroid ultrasound came back [Blood Thinners] : blood thinners stable both  and wife elected to proceed with another ultrasound in 6 months. There is no family history of thyroid cancer or radiation exposure. --Nuclear scan done in August 2022 showed elevated thyroid uptake at 42.8% with no cord nodule identified although the left thyroid lobe nodule was hyperactive. Patient will be seen in 3 to 4 months with a thyroid ultrasound prior to her visit.    ----   previously left lobe thyroid nodule FNA done at [facility].  8/2020 no malignant cells identified       --Obesity with pre-diabetes . Discussed diet changes. Aic 5.9 >>5.7  Follows with her primary care physician. Started  Ozempic 0.25 mg weekly she feels it helps   Increase to 0.5 milligrams weekly she will call us to get refills for the 1 mg weekly dose if she is able to tolerate the 0.5 without any side effects  She has lost 12 lbs since March 2022   All possible Side effects were discussed in detail with patient in detail and patient was advised to call our office if she  notes any side effects shewas given are written handout detailing side effects from the medication. ---Hyperlipidemia  On crestor 5 mg daily. Managed by PCP.     ---- HTN   Control stable     ---CAD s/p stent in Nov 2021   Follows with cardiology. Still on blood thinners   She has premature heart disease in her family. I spent  40 + minutes which includes reviewing patient chart , interpreting previous lab results  , discussing and providing counseling and coordinating care of patient's multiple health issues with  the patient. Return in about 3 months (around 1/18/2023). [FreeTextEntry1] : Xarelto and Aspirin - no bleeding

## 2022-10-20 VITALS
SYSTOLIC BLOOD PRESSURE: 141 MMHG | HEIGHT: 64 IN | DIASTOLIC BLOOD PRESSURE: 73 MMHG | HEART RATE: 63 BPM | RESPIRATION RATE: 16 BRPM | WEIGHT: 168 LBS | BODY MASS INDEX: 28.68 KG/M2

## 2022-12-04 NOTE — PROGRESS NOTES
Jake Lezama is a 60 y.o. female. HPI:  Here to discuss Multiple medical issues  COPD, coronary artery disease, hypertension, hyperlipidemia, obstructive sleep apnea    Here to review meds and labs    Pressure elevated, always in the doctor's office/whitecoat    Occasional palpitations may well be subclinical hyperthyroidism  Has been cleared cardiac wise, follow-up next summer to fall 2023    Working with Endo for thyroid issues, subclinical hyperthyroidism  Taking Tapazole with Benadryl and so far tolerating    Eating healthier and exercising regularly  Weight loss noted      BP at home 120/ 75   Weight loss noted     Meds, vitamins and allergies reviewed with pt    Wt Readings from Last 3 Encounters:   12/05/22 167 lb (75.8 kg)   10/18/22 168 lb (76.2 kg)   09/28/22 172 lb 1.6 oz (78.1 kg)       REVIEW OF SYSTEMS:   CONSTITUTIONAL: See history of present illness,   Weight noted   HEENT: No new vision difficulties or ringing in the ears. RESPIRATORY: No new SOB, PND, orthopnea or cough. CARDIOVASCULAR: no CP, palpitations or SOB with exertion  GI: No nausea, vomiting, diarrhea, constipation, abdominal pain or changes in bowel habits. : No urinary frequency, urgency, incontinence hematuria or dysuria. SKIN: No cyanosis or skin lesions. MUSCULOSKELETAL: No new muscle or joint pain. NEUROLOGICAL: No syncope or TIA-like symptoms. PSYCHIATRIC: No anxiety, insomnia or depression     Allergies   Allergen Reactions    Penicillins Anaphylaxis    Levaquin [Levofloxacin In D5w] Other (See Comments)     Joint pain    Methimazole Dizziness or Vertigo, Hives and Palpitations    Morphine Rash       Prior to Visit Medications    Medication Sig Taking? Authorizing Provider   rosuvastatin (CRESTOR) 10 MG tablet Take 1 tablet by mouth nightly Yes Merly Wise MD   carvedilol (COREG) 6.25 MG tablet Take 1 tablet by mouth in the morning and 1 tablet before bedtime.  Yes Merly Wise MD   famotidine (PEPCID) 20 MG tablet Take 1 tablet by mouth twice daily Yes Kal Maya MD   Semaglutide,0.25 or 0.5MG/DOS, (OZEMPIC, 0.25 OR 0.5 MG/DOSE,) 2 MG/1.5ML SOPN Inject 0.5 mg into the skin weekly. Yes Ba Mckeon MD   lisinopril (PRINIVIL;ZESTRIL) 20 MG tablet Take 1 tablet by mouth 2 times daily Yes Kal Maya MD   nitroGLYCERIN (NITROSTAT) 0.4 MG SL tablet Place 1 tablet under the tongue every 5 minutes as needed for Chest pain Yes Eli Thomas MD   Cholecalciferol (VITAMIN D) 50 MCG (2000 UT) CAPS capsule Take 2 capsules by mouth daily Yes Historical Provider, MD   aspirin 81 MG EC tablet Take 81 mg by mouth daily Yes Historical Provider, MD       Past Medical History:   Diagnosis Date    Arthritis     R KNEE-EVENTUALLY NEEDS RTKR    Breast lump or mass     CAD (coronary artery disease)     Gallstones     Hyperlipidemia     Hypertension     Thyroid disease     MULTINODULAR-6/2020       Social History     Tobacco Use    Smoking status: Former     Packs/day: 2.00     Years: 27.00     Pack years: 54.00     Types: Cigarettes     Start date: 1/4/1981     Quit date: 1/1/2007     Years since quitting: 15.9    Smokeless tobacco: Never   Substance Use Topics    Alcohol use: Never     Alcohol/week: 0.0 standard drinks       Family History   Problem Relation Age of Onset    Cancer Mother 52        UTERINE AND LUNG, SMOKER    Cancer Father 64        THROAT CANCER    Heart Disease Father     Cancer Brother 48        THROAT    Heart Disease Other        OBJECTIVE:  BP (!) 152/82   Pulse 67   Resp 16   Ht 5' 4.25\" (1.632 m)   Wt 167 lb (75.8 kg)   SpO2 97%   BMI 28.44 kg/m²   GEN:  in NAD  HEENT:  NCAT, TMs:normal and throat: clear  NECK:  Supple without adenopathy. CV:  Regular rate and rhythm, S1 and S2 normal, no murmurs, clicks  PULM:  Chest is clear, no wheezing ,  symmetric air entry throughout both lung fields.   ABD: Soft, NT, no masses  EXT: No rash or edema  NEURO: Alert oriented ×3, nonfocal, no assistive device  Lab Results   Component Value Date    CREATININE <0.5 (L) 10/14/2022        Lab Results   Component Value Date    CHOL 148 10/14/2022    CHOL 114 12/10/2021    CHOL 204 (H) 07/02/2021     Lab Results   Component Value Date    TRIG 55 10/14/2022    TRIG 72 12/10/2021    TRIG 77 07/02/2021     Lab Results   Component Value Date    HDL 62 (H) 10/14/2022    HDL 54 05/11/2022    HDL 51 12/10/2021     Lab Results   Component Value Date    LDLCALC 75 10/14/2022    LDLCALC 71 05/11/2022    LDLCALC 49 12/10/2021     Lab Results   Component Value Date    LABVLDL 11 10/14/2022    LABVLDL 12 05/11/2022    LABVLDL 14 12/10/2021     No results found for: CHOLHDLRATIO     ASSESSMENT/PLAN:  1. Essential hypertension  Pressure normal at home  Continue medications including Coreg 6.25 mg twice daily and lisinopril 20 mg twice daily    Healthy diet and exercise    2. Coronary artery disease involving native coronary artery of native heart without angina pectoris  Continue Coreg, statin and aspirin    3. Mixed hyperlipidemia  Continue 10 mg rosuvastatin nightly    4. Multinodular goiter  Working with Endo    5. ARLENE on CPAP  Using CPAP regularly    6. Screening for colon cancer  Screen  - Fecal DNA Colorectal cancer screening (Cologualeela)    7. Flu vaccine need  Vaccinate  - Influenza, FLUCELVAX, (age 10 mo+), IM, Preservative Free, 0.5 mL    8.   Need for pneumococcal vaccine  PCV 20 today    30 Total Minutes spent pre charting (reviewing problem list, meds, any test results, consultant and hospital notes ) and  obtaining present visit history, performing appropriate medical exam/evaluation, counseling and educating the patient (and family), ordering medications ,tests, and procedures as needed, refilling medication(s), placing referral(s) when needed in addition to coordinating care for this patient and documenting in electronic health record

## 2022-12-05 ENCOUNTER — OFFICE VISIT (OUTPATIENT)
Dept: FAMILY MEDICINE CLINIC | Age: 62
End: 2022-12-05
Payer: COMMERCIAL

## 2022-12-05 VITALS
WEIGHT: 167 LBS | SYSTOLIC BLOOD PRESSURE: 152 MMHG | HEIGHT: 64 IN | HEART RATE: 67 BPM | OXYGEN SATURATION: 97 % | RESPIRATION RATE: 16 BRPM | BODY MASS INDEX: 28.51 KG/M2 | DIASTOLIC BLOOD PRESSURE: 82 MMHG

## 2022-12-05 DIAGNOSIS — I25.10 CORONARY ARTERY DISEASE INVOLVING NATIVE CORONARY ARTERY OF NATIVE HEART WITHOUT ANGINA PECTORIS: Chronic | ICD-10-CM

## 2022-12-05 DIAGNOSIS — I10 ESSENTIAL HYPERTENSION: Primary | Chronic | ICD-10-CM

## 2022-12-05 DIAGNOSIS — Z23 FLU VACCINE NEED: ICD-10-CM

## 2022-12-05 DIAGNOSIS — E78.2 MIXED HYPERLIPIDEMIA: Chronic | ICD-10-CM

## 2022-12-05 DIAGNOSIS — Z12.11 SCREENING FOR COLON CANCER: ICD-10-CM

## 2022-12-05 DIAGNOSIS — G47.33 OSA ON CPAP: Chronic | ICD-10-CM

## 2022-12-05 DIAGNOSIS — Z99.89 OSA ON CPAP: Chronic | ICD-10-CM

## 2022-12-05 DIAGNOSIS — E04.2 MULTINODULAR GOITER: ICD-10-CM

## 2022-12-05 DIAGNOSIS — Z23 NEED FOR PNEUMOCOCCAL VACCINATION: ICD-10-CM

## 2022-12-05 PROCEDURE — 90677 PCV20 VACCINE IM: CPT | Performed by: FAMILY MEDICINE

## 2022-12-05 PROCEDURE — 99214 OFFICE O/P EST MOD 30 MIN: CPT | Performed by: FAMILY MEDICINE

## 2022-12-05 PROCEDURE — 90472 IMMUNIZATION ADMIN EACH ADD: CPT | Performed by: FAMILY MEDICINE

## 2022-12-05 PROCEDURE — 3078F DIAST BP <80 MM HG: CPT | Performed by: FAMILY MEDICINE

## 2022-12-05 PROCEDURE — 90674 CCIIV4 VAC NO PRSV 0.5 ML IM: CPT | Performed by: FAMILY MEDICINE

## 2022-12-05 PROCEDURE — 90471 IMMUNIZATION ADMIN: CPT | Performed by: FAMILY MEDICINE

## 2022-12-05 PROCEDURE — 3074F SYST BP LT 130 MM HG: CPT | Performed by: FAMILY MEDICINE

## 2022-12-05 RX ORDER — ROSUVASTATIN CALCIUM 10 MG/1
10 TABLET, COATED ORAL NIGHTLY
Qty: 90 TABLET | Refills: 3 | Status: SHIPPED | OUTPATIENT
Start: 2022-12-05

## 2022-12-05 SDOH — ECONOMIC STABILITY: FOOD INSECURITY: WITHIN THE PAST 12 MONTHS, YOU WORRIED THAT YOUR FOOD WOULD RUN OUT BEFORE YOU GOT MONEY TO BUY MORE.: NEVER TRUE

## 2022-12-05 SDOH — ECONOMIC STABILITY: FOOD INSECURITY: WITHIN THE PAST 12 MONTHS, THE FOOD YOU BOUGHT JUST DIDN'T LAST AND YOU DIDN'T HAVE MONEY TO GET MORE.: NEVER TRUE

## 2022-12-05 ASSESSMENT — PATIENT HEALTH QUESTIONNAIRE - PHQ9
SUM OF ALL RESPONSES TO PHQ QUESTIONS 1-9: 0
SUM OF ALL RESPONSES TO PHQ QUESTIONS 1-9: 0
2. FEELING DOWN, DEPRESSED OR HOPELESS: 0
SUM OF ALL RESPONSES TO PHQ QUESTIONS 1-9: 0
1. LITTLE INTEREST OR PLEASURE IN DOING THINGS: 0
SUM OF ALL RESPONSES TO PHQ9 QUESTIONS 1 & 2: 0
SUM OF ALL RESPONSES TO PHQ QUESTIONS 1-9: 0

## 2022-12-05 ASSESSMENT — SOCIAL DETERMINANTS OF HEALTH (SDOH): HOW HARD IS IT FOR YOU TO PAY FOR THE VERY BASICS LIKE FOOD, HOUSING, MEDICAL CARE, AND HEATING?: NOT HARD AT ALL

## 2022-12-06 DIAGNOSIS — E66.9 OBESITY, UNSPECIFIED CLASSIFICATION, UNSPECIFIED OBESITY TYPE, UNSPECIFIED WHETHER SERIOUS COMORBIDITY PRESENT: ICD-10-CM

## 2022-12-06 RX ORDER — SEMAGLUTIDE 1.34 MG/ML
INJECTION, SOLUTION SUBCUTANEOUS
Qty: 6 ML | Refills: 1 | Status: SHIPPED | OUTPATIENT
Start: 2022-12-06 | End: 2023-01-26

## 2022-12-13 ENCOUNTER — TELEPHONE (OUTPATIENT)
Dept: FAMILY MEDICINE CLINIC | Age: 62
End: 2022-12-13

## 2022-12-13 NOTE — PROGRESS NOTES
[facility] Medicine Preoperative Evaluation       Fina Pierre M.D.     [address] [facility], [address]     (phone) [phone]       (fax) [phone]    Dear Dr. Nohemi Garza,        Thank you for referring Ranjana Null to me for Preoperative Evaluation. Below are the relevant portions of my assessment and plan of care. Ranjana Null    62 y.o.   1960    [address] [place] [address]    Vitals:    12/14/22 1317 12/14/22 1353   BP: (!) 170/80 (!) 150/80   Site: Left Upper Arm    Position: Sitting    Cuff Size: Small Adult    Pulse: 63    Resp: 16    SpO2: 97%    Weight: 165 lb (74.8 kg)    Height: 5' 4.25\" (1.632 m)       Wt Readings from Last 2 Encounters:   12/14/22 165 lb (74.8 kg)   12/05/22 167 lb (75.8 kg)     BP Readings from Last 3 Encounters:   12/14/22 (!) 150/80   12/05/22 (!) 152/82   10/18/22 (!) 141/73        Allergies   Allergen Reactions    Penicillins Anaphylaxis    Levaquin [Levofloxacin In D5w] Other (See Comments)     Joint pain    Methimazole Dizziness or Vertigo, Hives and Palpitations    Morphine Rash     Current Outpatient Medications   Medication Sig Dispense Refill    OZEMPIC, 0.25 OR 0.5 MG/DOSE, 2 MG/1.5ML SOPN INJECT 0.5 MG INTO THE SKIN WEEKLY 6 mL 1    rosuvastatin (CRESTOR) 10 MG tablet Take 1 tablet by mouth nightly 90 tablet 3    carvedilol (COREG) 6.25 MG tablet Take 1 tablet by mouth in the morning and 1 tablet before bedtime.  180 tablet 3    famotidine (PEPCID) 20 MG tablet Take 1 tablet by mouth twice daily 180 tablet 3    lisinopril (PRINIVIL;ZESTRIL) 20 MG tablet Take 1 tablet by mouth 2 times daily 180 tablet 3    nitroGLYCERIN (NITROSTAT) 0.4 MG SL tablet Place 1 tablet under the tongue every 5 minutes as needed for Chest pain 25 tablet 3    Cholecalciferol (VITAMIN D) 50 MCG (2000 UT) CAPS capsule Take 2 capsules by mouth daily      aspirin 81 MG EC tablet Take 81 mg by mouth daily       No current facility-administered medications for this visit. She presents to the office today for a preoperative consultation at the request of surgeon, Dr. Haylie Lewis  who plans on performing  eyelid surgery on December 20 , 2022. The current problem began > 5-10 yrs years ago and has worsened. Conservative therapy, including surveillance, has failed. Planned anesthesia is Mac. The patient has no known anesthesia issues. Patient is taking asa for cardiac stents, off plavix   No loose teeth or dentures       Past Medical History:   Diagnosis Date    Arthritis     R KNEE-EVENTUALLY NEEDS RTKR    Breast lump or mass     CAD (coronary artery disease)     Gallstones     Hyperlipidemia     Hypertension     Thyroid disease     MULTINODULAR-6/2020     Past Surgical History:   Procedure Laterality Date    BREAST SURGERY  [id number]    CHOLECYSTECTOMY, LAPAROSCOPIC  02/03/2017    COLONOSCOPY  8.1.2010    CYSTOSCOPY  7.19.2007    HYSTERECTOMY (CERVIX STATUS UNKNOWN)  2007    SKIN BIOPSY      UPPER CHEST MOLE BX-2010    TONSILLECTOMY      AGE 22 YRS.       Family History is not significant for reactions to anesthesia  Social History     Socioeconomic History    Marital status:      Spouse name: [facility]    Number of children: 2    Years of education: 12    Highest education level: Not on file   Occupational History    Occupation:      Employer: [employer]   Tobacco Use    Smoking status: Former     Packs/day: 2.00     Years: 27.00     Pack years: 54.00     Types: Cigarettes     Start date: 1/4/1981     Quit date: 1/1/2007     Years since quitting: 15.9    Smokeless tobacco: Never   Vaping Use    Vaping Use: Never used   Substance and Sexual Activity    Alcohol use: Never     Alcohol/week: 0.0 standard drinks    Drug use: Never    Sexual activity: Yes     Partners: Male   Other Topics Concern    Not on file   Social History Narrative    Not on file     Social Determinants of Health Financial Resource Strain: Low Risk     Difficulty of Paying Living Expenses: Not hard at all   Food Insecurity: No Food Insecurity    Worried About Running Out of Food in the Last Year: Never true    Ran Out of Food in the Last Year: Never true   Transportation Needs: Not on file   Physical Activity: Not on file   Stress: Not on file   Social Connections: Not on file   Intimate Partner Violence: Not on file   Housing Stability: Not on file       REVIEW OF SYSTEMS:   CONSTITUTIONAL: No major weight gain or loss, fatigue, weakness, night sweats or fever. HEENT: droopy eyelids bilaterally , hearing ok   RESPIRATORY: No new SOB, PND, orthopnea or cough. CARDIOVASCULAR: no CP, palpitations or SOB with exertion  GI: No nausea, vomiting, diarrhea, constipation, abdominal pain or changes in bowel habits. : No urinary frequency, urgency, incontinence hematuria or dysuria. SKIN: No cyanosis or skin lesions. MUSCULOSKELETAL: No new muscle or joint pain. NEUROLOGICAL: No syncope or TIA-like symptoms. PSYCHIATRIC: No anxiety, insomnia or depression     Physical Exam   BP (!) 150/80   Pulse 63   Resp 16   Ht 5' 4.25\" (1.632 m)   Wt 165 lb (74.8 kg)   SpO2 97%   BMI 28.10 kg/m²   Constitutional: Patient is oriented to person, place, and time. She appears in no distress. Head: Normocephalic and atraumatic. Mouth/Throat: Oropharynx is clear and moist, and mucous membranes are normal.  There is no cervical adenopathy. There are no loose teeth. Eyes: Conjunctivae and extraocular motions are normal. Pupils are equal, round, and reactive to light bilaterally. Droopy eyelids bilaterally  Neck: Neck supple. No JVD present. No mass and no thyromegaly present. Cardiovascular: Normal rate, regular rhythm, normal heart sounds and intact distal pulses. Exam reveals no gallop and no friction rub. No murmur heard. Pulmonary/Chest: Effort normal and breath sounds normal. No respiratory distress.  There are no wheezes, rhonchi or rales. Abdominal: Soft, non-tender. Normal bowel sounds and aorta. There is no organomegaly, bruit or palpable mass. Neurological: She is alert and oriented to person, place, and time. She has normal reflexes. No cranial nerve deficit. Coordination normal.   Skin: Skin is warm and dry. There is no rash or erythema. No suspicious lesions noted. Psychiatric: She has a normal mood and affect. Speech and behavior are normal. Judgment, cognition and memory are normal.     EKG Interpretation: Not performed    Lab Results   Component Value Date     10/14/2022    K 5.0 10/14/2022     10/14/2022    CO2 29 10/14/2022        Lab Results   Component Value Date    CREATININE <0.5 (L) 10/14/2022         Lab Results   Component Value Date    WBC 6.5 06/23/2022    HGB 12.8 06/23/2022    HCT 38.8 06/23/2022    MCV 84.1 06/23/2022     06/23/2022          Assessment:  Encounter Diagnoses   Name Primary? Preop examination Yes    Droopy eyelid, bilateral     White coat syndrome with hypertension         60 y.o. patient with planned surgery as above. Known risk factors for perioperative complications: Hypertension is controlled at home, coronary artery disease is stable but remains needs to remain on aspirin per cardiology      Plan:    1. Preoperative workup as follows: hemoglobin, hematocrit, electrolytes, creatinine, glucose. 2. Change in medication regimen before surgery: discontinue NSAIDs (asa+ fish oil ) 7d before surgery. 3. Prophylaxis for cardiac events with perioperative beta-blockers: On Coreg twice daily. 4. Invasive hemodynamic monitoring perioperatively: not indicated. 5. Patient is cleared for upcoming surgery. If you have questions, please do not hesitate to call me. Sincerely,        Nir Gusman.  Rianna Hendrickson M.D.    30 Total minutes spent precharting(reviewing problem list, meds, any test results, consultant and hospital notes) and obtaining present visit history, performing appropriate medical examination, counseling and educating the patient(and family), ordering medications, completing preop testing and labs, coordinating care for this patient and documenting in electronic health record

## 2022-12-13 NOTE — TELEPHONE ENCOUNTER
Dr Joon Louie would like pt to schedule an appt for a pre-op. She can schedule this Wednesday or Friday.     [facility] to schedule appt)    (paperwork for pre-op put in Dr Rosa Rodney mail bin)

## 2022-12-14 ENCOUNTER — OFFICE VISIT (OUTPATIENT)
Dept: FAMILY MEDICINE CLINIC | Age: 62
End: 2022-12-14
Payer: COMMERCIAL

## 2022-12-14 VITALS
WEIGHT: 165 LBS | HEIGHT: 64 IN | RESPIRATION RATE: 16 BRPM | OXYGEN SATURATION: 97 % | HEART RATE: 63 BPM | DIASTOLIC BLOOD PRESSURE: 80 MMHG | SYSTOLIC BLOOD PRESSURE: 150 MMHG | BODY MASS INDEX: 28.17 KG/M2

## 2022-12-14 DIAGNOSIS — I10 WHITE COAT SYNDROME WITH HYPERTENSION: ICD-10-CM

## 2022-12-14 DIAGNOSIS — H02.403 DROOPY EYELID, BILATERAL: ICD-10-CM

## 2022-12-14 DIAGNOSIS — Z01.818 PREOP EXAMINATION: Primary | ICD-10-CM

## 2022-12-14 PROCEDURE — 99214 OFFICE O/P EST MOD 30 MIN: CPT | Performed by: FAMILY MEDICINE

## 2022-12-14 PROCEDURE — 3074F SYST BP LT 130 MM HG: CPT | Performed by: FAMILY MEDICINE

## 2022-12-14 PROCEDURE — 3078F DIAST BP <80 MM HG: CPT | Performed by: FAMILY MEDICINE

## 2022-12-14 ASSESSMENT — PATIENT HEALTH QUESTIONNAIRE - PHQ9
1. LITTLE INTEREST OR PLEASURE IN DOING THINGS: 0
SUM OF ALL RESPONSES TO PHQ9 QUESTIONS 1 & 2: 0
SUM OF ALL RESPONSES TO PHQ QUESTIONS 1-9: 0
2. FEELING DOWN, DEPRESSED OR HOPELESS: 0

## 2022-12-28 ENCOUNTER — HOSPITAL ENCOUNTER (OUTPATIENT)
Dept: ULTRASOUND IMAGING | Age: 62
Discharge: HOME | End: 2022-12-28
Payer: COMMERCIAL

## 2022-12-28 ENCOUNTER — HOSPITAL ENCOUNTER (OUTPATIENT)
Dept: GENERAL RADIOLOGY | Age: 62
Discharge: HOME | End: 2022-12-28
Payer: COMMERCIAL

## 2022-12-28 DIAGNOSIS — Z78.0 POSTMENOPAUSAL: ICD-10-CM

## 2022-12-28 DIAGNOSIS — E05.90 HYPERTHYROIDISM: ICD-10-CM

## 2022-12-28 PROCEDURE — 77080 DXA BONE DENSITY AXIAL: CPT

## 2022-12-28 PROCEDURE — 76536 US EXAM OF HEAD AND NECK: CPT

## 2023-01-23 DIAGNOSIS — Z78.0 POSTMENOPAUSAL: ICD-10-CM

## 2023-01-23 DIAGNOSIS — E05.90 HYPERTHYROIDISM: ICD-10-CM

## 2023-01-23 DIAGNOSIS — R73.03 PREDIABETES: ICD-10-CM

## 2023-01-23 LAB
A/G RATIO: 2 (ref 1.1–2.2)
ALBUMIN SERPL-MCNC: 4.3 G/DL (ref 3.4–5)
ALP BLD-CCNC: 64 U/L (ref 40–129)
ALT SERPL-CCNC: 14 U/L (ref 10–40)
ANION GAP SERPL CALCULATED.3IONS-SCNC: 11 MMOL/L (ref 3–16)
AST SERPL-CCNC: 14 U/L (ref 15–37)
BASOPHILS ABSOLUTE: 0.1 K/UL (ref 0–0.2)
BASOPHILS RELATIVE PERCENT: 1.4 %
BILIRUB SERPL-MCNC: 0.5 MG/DL (ref 0–1)
BUN BLDV-MCNC: 21 MG/DL (ref 7–20)
CALCIUM SERPL-MCNC: 9.4 MG/DL (ref 8.3–10.6)
CHLORIDE BLD-SCNC: 106 MMOL/L (ref 99–110)
CHOLESTEROL, TOTAL: 130 MG/DL (ref 0–199)
CO2: 27 MMOL/L (ref 21–32)
CREAT SERPL-MCNC: <0.5 MG/DL (ref 0.6–1.2)
EOSINOPHILS ABSOLUTE: 0.2 K/UL (ref 0–0.6)
EOSINOPHILS RELATIVE PERCENT: 3.4 %
ESTIMATED AVERAGE GLUCOSE: 111.2 MG/DL
GFR SERPL CREATININE-BSD FRML MDRD: >60 ML/MIN/{1.73_M2}
GLUCOSE BLD-MCNC: 97 MG/DL (ref 70–99)
HBA1C MFR BLD: 5.5 %
HCT VFR BLD CALC: 42.1 % (ref 36–48)
HDLC SERPL-MCNC: 64 MG/DL (ref 40–60)
HEMOGLOBIN: 13.3 G/DL (ref 12–16)
LDL CHOLESTEROL CALCULATED: 56 MG/DL
LYMPHOCYTES ABSOLUTE: 2.1 K/UL (ref 1–5.1)
LYMPHOCYTES RELATIVE PERCENT: 36.2 %
MCH RBC QN AUTO: 27.3 PG (ref 26–34)
MCHC RBC AUTO-ENTMCNC: 31.6 G/DL (ref 31–36)
MCV RBC AUTO: 86.5 FL (ref 80–100)
MONOCYTES ABSOLUTE: 0.4 K/UL (ref 0–1.3)
MONOCYTES RELATIVE PERCENT: 7 %
NEUTROPHILS ABSOLUTE: 3 K/UL (ref 1.7–7.7)
NEUTROPHILS RELATIVE PERCENT: 52 %
PDW BLD-RTO: 13.3 % (ref 12.4–15.4)
PLATELET # BLD: 266 K/UL (ref 135–450)
PMV BLD AUTO: 9.1 FL (ref 5–10.5)
POTASSIUM SERPL-SCNC: 4.2 MMOL/L (ref 3.5–5.1)
RBC # BLD: 4.87 M/UL (ref 4–5.2)
SODIUM BLD-SCNC: 144 MMOL/L (ref 136–145)
T4 FREE: 1 NG/DL (ref 0.9–1.8)
TOTAL PROTEIN: 6.5 G/DL (ref 6.4–8.2)
TRIGL SERPL-MCNC: 50 MG/DL (ref 0–150)
TSH SERPL DL<=0.05 MIU/L-ACNC: 0.26 UIU/ML (ref 0.27–4.2)
VLDLC SERPL CALC-MCNC: 10 MG/DL
WBC # BLD: 5.9 K/UL (ref 4–11)

## 2023-01-26 ENCOUNTER — OFFICE VISIT (OUTPATIENT)
Dept: ENDOCRINOLOGY | Age: 63
End: 2023-01-26
Payer: COMMERCIAL

## 2023-01-26 VITALS
BODY MASS INDEX: 28.68 KG/M2 | WEIGHT: 168 LBS | SYSTOLIC BLOOD PRESSURE: 153 MMHG | HEART RATE: 66 BPM | HEIGHT: 64 IN | RESPIRATION RATE: 16 BRPM | DIASTOLIC BLOOD PRESSURE: 75 MMHG

## 2023-01-26 DIAGNOSIS — R73.03 PREDIABETES: Primary | ICD-10-CM

## 2023-01-26 DIAGNOSIS — E04.1 THYROID NODULE: ICD-10-CM

## 2023-01-26 DIAGNOSIS — E05.90 HYPERTHYROIDISM: ICD-10-CM

## 2023-01-26 DIAGNOSIS — E66.9 OBESITY, UNSPECIFIED CLASSIFICATION, UNSPECIFIED OBESITY TYPE, UNSPECIFIED WHETHER SERIOUS COMORBIDITY PRESENT: ICD-10-CM

## 2023-01-26 PROCEDURE — 3078F DIAST BP <80 MM HG: CPT | Performed by: INTERNAL MEDICINE

## 2023-01-26 PROCEDURE — 3077F SYST BP >= 140 MM HG: CPT | Performed by: INTERNAL MEDICINE

## 2023-01-26 PROCEDURE — 99214 OFFICE O/P EST MOD 30 MIN: CPT | Performed by: INTERNAL MEDICINE

## 2023-01-26 RX ORDER — METHIMAZOLE 5 MG/1
5 TABLET ORAL
COMMUNITY
Start: 2023-01-03

## 2023-01-26 NOTE — PROGRESS NOTES
Michael Madison is a 57 y.o. female who is here for management  Of obesity, prediabetes and thyroid nodule. Patient has a PMH of HTN, breast lump, hyperlipidemia. Patient had Thyroid US done in  06/20 which showed nodules   In right lobe , dominant nodule measures 1.2 cm solid . In left lobe , dominant nodule measures 2.8 cm in mid pole and 1.8 cm in lower pole. Occasional difficulty swallowing. Sister has h/o multinodular goiter and had recent surgery. There was no thyroid cancer diagnosed in the family  No FH of thyroid cancer  No History of exposure to radiation as a child. She has strong FH of DM  She has tried different diets . Unable to stay on diet long term. 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 She had Covid in jan 2022 which was mild       INTERIM    she overall feels better since started Ozempic.-Wants to lose more weight  Has been able to tolerate Tapazole she has been taking 2.5 mg daily for the last 4 weeks and notes improvement in her symptoms. Denies any issues with swallowing or choking. Does complain of palpitations          Allergies   Allergen Reactions    Penicillins Anaphylaxis    Levaquin [Levofloxacin In D5w] Other (See Comments)     Joint pain    Morphine Rash     Outpatient Medications Marked as Taking for the 1/26/23 encounter (Office Visit) with Maryana Posada MD   Medication Sig Dispense Refill    methIMAzole (TAPAZOLE) 5 MG tablet 5 mg Pt taking 1/2 tab daily. Semaglutide, 1 MG/DOSE, 4 MG/3ML SOPN Take 1 mg weekly dose 3 mL 3    rosuvastatin (CRESTOR) 10 MG tablet Take 1 tablet by mouth nightly 90 tablet 3    carvedilol (COREG) 6.25 MG tablet Take 1 tablet by mouth in the morning and 1 tablet before bedtime.  180 tablet 3    famotidine (PEPCID) 20 MG tablet Take 1 tablet by mouth twice daily 180 tablet 3    lisinopril (PRINIVIL;ZESTRIL) 20 MG tablet Take 1 tablet by mouth 2 times daily 180 tablet 3    nitroGLYCERIN (NITROSTAT) 0.4 MG SL tablet Place 1 tablet under the tongue every 5 minutes as needed for Chest pain 25 tablet 3    Cholecalciferol (VITAMIN D) 50 MCG ( UT) CAPS capsule Take 2 capsules by mouth daily      aspirin 81 MG EC tablet Take 81 mg by mouth daily           Vitals:    23 1246   BP: (!) 153/75   Pulse: 66   Resp: 16   Weight: 168 lb (76.2 kg)   Height: 5' 4.25\" (1.632 m)       Past Medical History:   Diagnosis Date    Arthritis     R KNEE-EVENTUALLY NEEDS RTKR    Breast lump or mass     CAD (coronary artery disease)     Gallstones     Hyperlipidemia     Hypertension     Thyroid disease     MULTINODULAR-2020     Past Surgical History:   Procedure Laterality Date    BREAST SURGERY  [id number]    CHOLECYSTECTOMY, LAPAROSCOPIC  2017    COLONOSCOPY  8.1.    CYSTOSCOPY  7.19.    HYSTERECTOMY (CERVIX STATUS UNKNOWN)      SKIN BIOPSY      UPPER CHEST MOLE BX-2010    TONSILLECTOMY      AGE 22 YRS. Family History   Problem Relation Age of Onset    Cancer Mother 52        UTERINE AND LUNG, SMOKER    Cancer Father 64        THROAT CANCER    Heart Disease Father     Cancer Brother 48        THROAT    Heart Disease Other      Social History     Tobacco Use   Smoking Status Former    Packs/day: 2.00    Years: 27.00    Pack years: 54.00    Types: Cigarettes    Start date: 1981    Quit date: 2007    Years since quittin.0   Smokeless Tobacco Never      Social History     Substance and Sexual Activity   Alcohol Use Never    Alcohol/week: 0.0 standard drinks         ROS  I have reviewed the review of system questionnaire filled by the patient .   Patient was advised to contact PCP for non endocrine signs and symptoms     EXAM   Constitutional: no acute distress, well appearing, well nourished  Psychiatric: oriented to person, place and time, judgement, insight and normal, recent and remote memory and intact and mood, affect are normal  Skin: skin and subcutaneous tissue is normal without mass,   Head and Face: examination of head and face revealed no abnormalities  Eyes: no lid or conjunctival swelling, no erythema or discharge, pupils are normal,   Ears/Nose: external inspection of ears and nose revealed no abnormalities, hearing is grossly normal  Oropharynx/Mouth/Face: lips, tongue and gums are normal with no lesions, the voice quality was normal  Neck: neck is supple and symmetric, with midline trachea and no masses, thyroid is enlarged    Pulmonary: no increased work of breathing or signs of respiratory distress, lungs are clear to auscultation  Cardiovascular: normal heart rate and rhythm, normal S1 and S2,   Musculoskeletal: normal gait and station,   Neurological: normal coordination, normal general cortical function    Lab Results   Component Value Date    TSHFT4 0.88 12/05/2016    TSH 0.26 (L) 01/23/2023       Assessment/Plan    ---- TOXIC MULTINODULAR GOITER   She has  subclinical hyperthyroidism    ---tapazole 2.5 mg daily since Xmas TSH has improved to 0.2     --Previously she had issues tolerating the Tapazole and had skin rash but this time she was able to tolerated as she initiated Tapazole with the Benadryl and finally tapered herself off of the Benadryl. There is no family history of thyroid cancer or radiation exposure. --Nuclear scan done in August 2022 showed elevated thyroid uptake at 42.8% with no cord nodule identified although the left thyroid lobe nodule was hyperactive. Patient will be seen in 3 to 4 months with a thyroid ultrasound prior to her visit.    ----   previously left lobe thyroid nodule F[facility] done at [facility].  8/2020 no malignant cells identified       --Obesity with pre-diabetes . Discussed diet changes.    Aic 5.9 >>5.7  Follows with her primary care physician.  ---Ozempic 0.5 mg weekly she feels it helps somewhat bit not enough weight loss   Increase to 1 mg weekly   Increase to 0.5 milligrams weekly she will call us to get refills for the 1 mg weekly dose if she is able to tolerate the 0.5 without any side effects  She has lost 12 lbs since March 2022   All possible Side effects were discussed in detail with patient in detail and patient was advised to call our office if she  notes any side effects shewas given are written handout detailing side effects from the medication. ---Hyperlipidemia  On crestor 5 mg daily. Managed by PCP.     ---- HTN   Control stable     ---CAD s/p stent in Nov 2021   Follows with cardiology. Still on blood thinners   She has premature heart disease in her family. Return in about 6 months (around 7/26/2023).

## 2023-02-01 NOTE — TELEPHONE ENCOUNTER
Medication:   Requested Prescriptions     Pending Prescriptions Disp Refills    lisinopril (PRINIVIL;ZESTRIL) 20 MG tablet [Pharmacy Med Name: Lisinopril 20 MG Oral Tablet] 180 tablet 0     Sig: Take 1 tablet by mouth twice daily       Last Filled:  2/18/2022    Patient Phone Number: [phone] (home)     Last appt: 12/14/2022   Next appt: Visit date not found    Lab Results   Component Value Date     01/23/2023    K 4.2 01/23/2023     01/23/2023    CO2 27 01/23/2023    BUN 21 (H) 01/23/2023    CREATININE <0.5 (L) 01/23/2023    GLUCOSE 97 01/23/2023    CALCIUM 9.4 01/23/2023    PROT 6.5 01/23/2023    LABALBU 4.3 01/23/2023    BILITOT 0.5 01/23/2023    ALKPHOS 64 01/23/2023    AST 14 (L) 01/23/2023    ALT 14 01/23/2023    LABGLOM >60 01/23/2023    GFRAA >60 10/14/2022    AGRATIO 2.0 01/23/2023    GLOB 2.5 07/02/2021

## 2023-02-02 RX ORDER — LISINOPRIL 20 MG/1
TABLET ORAL
Qty: 180 TABLET | Refills: 3 | Status: SHIPPED | OUTPATIENT
Start: 2023-02-02

## 2023-02-06 ENCOUNTER — TELEPHONE (OUTPATIENT)
Dept: CARDIOLOGY CLINIC | Age: 63
End: 2023-02-06

## 2023-02-06 NOTE — TELEPHONE ENCOUNTER
CARDIAC CLEARANCE     What type of procedure are you having? Single extraction - with IV Sedation    Which physician is performing your procedure? Dr. Bess Turcios    When is your procedure scheduled for? 02/14    Where are you having this procedure? [facility] office    Are you taking Blood Thinners? Yes   If so what? (Name/dose/frequesncy)  Aspirin 81 mg    Does the surgeon want you to stop your blood thinner? If so for how long?   No    Phone Number and Contact Name for Physicians office:  Tiffany Amezquita [phone]  Fax number to send information:   [phone]

## 2023-03-16 DIAGNOSIS — E05.90 HYPERTHYROIDISM: Primary | ICD-10-CM

## 2023-03-16 RX ORDER — METHIMAZOLE 5 MG/1
TABLET ORAL
Qty: 30 TABLET | Refills: 3 | Status: SHIPPED | OUTPATIENT
Start: 2023-03-16

## 2023-04-29 ENCOUNTER — APPOINTMENT (OUTPATIENT)
Dept: MRI IMAGING | Age: 63
End: 2023-04-29
Payer: COMMERCIAL

## 2023-04-29 ENCOUNTER — APPOINTMENT (OUTPATIENT)
Dept: GENERAL RADIOLOGY | Age: 63
End: 2023-04-29
Payer: COMMERCIAL

## 2023-04-29 ENCOUNTER — APPOINTMENT (OUTPATIENT)
Dept: CT IMAGING | Age: 63
End: 2023-04-29
Payer: COMMERCIAL

## 2023-04-29 ENCOUNTER — HOSPITAL ENCOUNTER (OUTPATIENT)
Age: 63
Setting detail: OBSERVATION
Discharge: HOME | End: 2023-05-01
Attending: EMERGENCY MEDICINE | Admitting: INTERNAL MEDICINE
Payer: COMMERCIAL

## 2023-04-29 DIAGNOSIS — R42 VERTIGO: Primary | ICD-10-CM

## 2023-04-29 DIAGNOSIS — R26.2 UNABLE TO AMBULATE: ICD-10-CM

## 2023-04-29 LAB
ALBUMIN SERPL-MCNC: 4.1 G/DL (ref 3.4–5)
ALBUMIN/GLOB SERPL: 1.3 {RATIO} (ref 1.1–2.2)
ALP SERPL-CCNC: 72 U/L (ref 40–129)
ALT SERPL-CCNC: 22 U/L (ref 10–40)
ANION GAP SERPL CALCULATED.3IONS-SCNC: 8 MMOL/L (ref 3–16)
AST SERPL-CCNC: 24 U/L (ref 15–37)
BASOPHILS # BLD: 0.1 K/UL (ref 0–0.2)
BASOPHILS NFR BLD: 0.9 %
BILIRUB SERPL-MCNC: 0.4 MG/DL (ref 0–1)
BUN SERPL-MCNC: 18 MG/DL (ref 7–20)
CALCIUM SERPL-MCNC: 9.1 MG/DL (ref 8.3–10.6)
CHLORIDE SERPL-SCNC: 107 MMOL/L (ref 99–110)
CHP ED QC CHECK: YES
CO2 SERPL-SCNC: 26 MMOL/L (ref 21–32)
CREAT SERPL-MCNC: <0.5 MG/DL (ref 0.6–1.2)
DEPRECATED RDW RBC AUTO: 13.7 % (ref 12.4–15.4)
EKG ATRIAL RATE: 76 BPM
EKG DIAGNOSIS: NORMAL
EKG P AXIS: 75 DEGREES
EKG P-R INTERVAL: 222 MS
EKG Q-T INTERVAL: 366 MS
EKG QRS DURATION: 90 MS
EKG QTC CALCULATION (BAZETT): 411 MS
EKG R AXIS: 6 DEGREES
EKG T AXIS: 59 DEGREES
EKG VENTRICULAR RATE: 76 BPM
EOSINOPHIL # BLD: 0.3 K/UL (ref 0–0.6)
EOSINOPHIL NFR BLD: 4.6 %
GFR SERPLBLD CREATININE-BSD FMLA CKD-EPI: >60 ML/MIN/{1.73_M2}
GLUCOSE BLD-MCNC: 105 MG/DL (ref 70–99)
GLUCOSE SERPL-MCNC: 111 MG/DL (ref 70–99)
HCT VFR BLD AUTO: 42 % (ref 36–48)
HGB BLD-MCNC: 13.9 G/DL (ref 12–16)
INR PPP: 0.86 (ref 0.84–1.16)
LYMPHOCYTES # BLD: 1.6 K/UL (ref 1–5.1)
LYMPHOCYTES NFR BLD: 25.4 %
MCH RBC QN AUTO: 28.6 PG (ref 26–34)
MCHC RBC AUTO-ENTMCNC: 33.1 G/DL (ref 31–36)
MCV RBC AUTO: 86.3 FL (ref 80–100)
MONOCYTES # BLD: 0.4 K/UL (ref 0–1.3)
MONOCYTES NFR BLD: 6.7 %
NEUTROPHILS # BLD: 3.9 K/UL (ref 1.7–7.7)
NEUTROPHILS NFR BLD: 62.4 %
PERFORMED ON: ABNORMAL
PLATELET # BLD AUTO: 285 K/UL (ref 135–450)
PMV BLD AUTO: 8 FL (ref 5–10.5)
POTASSIUM SERPL-SCNC: 4.2 MMOL/L (ref 3.5–5.1)
PROT SERPL-MCNC: 7.2 G/DL (ref 6.4–8.2)
PROTHROMBIN TIME: 11.8 SEC (ref 11.5–14.8)
RBC # BLD AUTO: 4.86 M/UL (ref 4–5.2)
SODIUM SERPL-SCNC: 141 MMOL/L (ref 136–145)
TROPONIN, HIGH SENSITIVITY: <6 NG/L (ref 0–14)
WBC # BLD AUTO: 6.2 K/UL (ref 4–11)

## 2023-04-29 PROCEDURE — 99285 EMERGENCY DEPT VISIT HI MDM: CPT

## 2023-04-29 PROCEDURE — 6370000000 HC RX 637 (ALT 250 FOR IP): Performed by: INTERNAL MEDICINE

## 2023-04-29 PROCEDURE — 6370000000 HC RX 637 (ALT 250 FOR IP): Performed by: EMERGENCY MEDICINE

## 2023-04-29 PROCEDURE — 70498 CT ANGIOGRAPHY NECK: CPT

## 2023-04-29 PROCEDURE — 2580000003 HC RX 258: Performed by: EMERGENCY MEDICINE

## 2023-04-29 PROCEDURE — 70450 CT HEAD/BRAIN W/O DYE: CPT

## 2023-04-29 PROCEDURE — 6360000004 HC RX CONTRAST MEDICATION: Performed by: EMERGENCY MEDICINE

## 2023-04-29 PROCEDURE — G0378 HOSPITAL OBSERVATION PER HR: HCPCS

## 2023-04-29 PROCEDURE — 85610 PROTHROMBIN TIME: CPT

## 2023-04-29 PROCEDURE — 36415 COLL VENOUS BLD VENIPUNCTURE: CPT

## 2023-04-29 PROCEDURE — 85025 COMPLETE CBC W/AUTO DIFF WBC: CPT

## 2023-04-29 PROCEDURE — 84484 ASSAY OF TROPONIN QUANT: CPT

## 2023-04-29 PROCEDURE — 71045 X-RAY EXAM CHEST 1 VIEW: CPT

## 2023-04-29 PROCEDURE — 80053 COMPREHEN METABOLIC PANEL: CPT

## 2023-04-29 PROCEDURE — 93005 ELECTROCARDIOGRAM TRACING: CPT | Performed by: EMERGENCY MEDICINE

## 2023-04-29 PROCEDURE — 93010 ELECTROCARDIOGRAM REPORT: CPT | Performed by: INTERNAL MEDICINE

## 2023-04-29 PROCEDURE — 70551 MRI BRAIN STEM W/O DYE: CPT

## 2023-04-29 PROCEDURE — 96361 HYDRATE IV INFUSION ADD-ON: CPT

## 2023-04-29 PROCEDURE — 94760 N-INVAS EAR/PLS OXIMETRY 1: CPT

## 2023-04-29 PROCEDURE — 96360 HYDRATION IV INFUSION INIT: CPT

## 2023-04-29 RX ORDER — ROSUVASTATIN CALCIUM 10 MG/1
10 TABLET, COATED ORAL NIGHTLY
Status: DISCONTINUED | OUTPATIENT
Start: 2023-04-29 | End: 2023-05-01 | Stop reason: HOSPADM

## 2023-04-29 RX ORDER — LISINOPRIL 20 MG/1
20 TABLET ORAL 2 TIMES DAILY
Status: DISCONTINUED | OUTPATIENT
Start: 2023-04-29 | End: 2023-05-01 | Stop reason: HOSPADM

## 2023-04-29 RX ORDER — ASPIRIN 81 MG/1
324 TABLET, CHEWABLE ORAL ONCE
Status: COMPLETED | OUTPATIENT
Start: 2023-04-29 | End: 2023-04-29

## 2023-04-29 RX ORDER — 0.9 % SODIUM CHLORIDE 0.9 %
1000 INTRAVENOUS SOLUTION INTRAVENOUS ONCE
Status: COMPLETED | OUTPATIENT
Start: 2023-04-29 | End: 2023-04-29

## 2023-04-29 RX ORDER — POLYETHYLENE GLYCOL 3350 17 G/17G
17 POWDER, FOR SOLUTION ORAL DAILY PRN
Status: DISCONTINUED | OUTPATIENT
Start: 2023-04-29 | End: 2023-05-01 | Stop reason: HOSPADM

## 2023-04-29 RX ORDER — DIAZEPAM 5 MG/1
5 TABLET ORAL ONCE
Status: COMPLETED | OUTPATIENT
Start: 2023-04-29 | End: 2023-04-29

## 2023-04-29 RX ORDER — ONDANSETRON 4 MG/1
4 TABLET, ORALLY DISINTEGRATING ORAL EVERY 8 HOURS PRN
Status: DISCONTINUED | OUTPATIENT
Start: 2023-04-29 | End: 2023-05-01 | Stop reason: HOSPADM

## 2023-04-29 RX ORDER — CARVEDILOL 6.25 MG/1
6.25 TABLET ORAL 2 TIMES DAILY
Status: DISCONTINUED | OUTPATIENT
Start: 2023-04-29 | End: 2023-05-01 | Stop reason: HOSPADM

## 2023-04-29 RX ORDER — ENOXAPARIN SODIUM 100 MG/ML
40 INJECTION SUBCUTANEOUS DAILY
Status: DISCONTINUED | OUTPATIENT
Start: 2023-04-30 | End: 2023-05-01 | Stop reason: HOSPADM

## 2023-04-29 RX ORDER — ASPIRIN 81 MG/1
81 TABLET, CHEWABLE ORAL DAILY
Status: DISCONTINUED | OUTPATIENT
Start: 2023-04-30 | End: 2023-05-01 | Stop reason: HOSPADM

## 2023-04-29 RX ORDER — METHIMAZOLE 5 MG/1
2.5 TABLET ORAL DAILY
Status: DISCONTINUED | OUTPATIENT
Start: 2023-04-30 | End: 2023-05-01 | Stop reason: HOSPADM

## 2023-04-29 RX ORDER — ASPIRIN 81 MG/1
81 TABLET ORAL DAILY
Status: DISCONTINUED | OUTPATIENT
Start: 2023-04-29 | End: 2023-04-29

## 2023-04-29 RX ORDER — ASPIRIN 300 MG/1
300 SUPPOSITORY RECTAL DAILY
Status: DISCONTINUED | OUTPATIENT
Start: 2023-04-29 | End: 2023-04-29

## 2023-04-29 RX ORDER — ONDANSETRON 2 MG/ML
4 INJECTION INTRAMUSCULAR; INTRAVENOUS EVERY 6 HOURS PRN
Status: DISCONTINUED | OUTPATIENT
Start: 2023-04-29 | End: 2023-05-01 | Stop reason: HOSPADM

## 2023-04-29 RX ORDER — MECLIZINE HCL 12.5 MG/1
50 TABLET ORAL ONCE
Status: COMPLETED | OUTPATIENT
Start: 2023-04-29 | End: 2023-04-29

## 2023-04-29 RX ADMIN — MECLIZINE 50 MG: 12.5 TABLET ORAL at 09:00

## 2023-04-29 RX ADMIN — LISINOPRIL 20 MG: 20 TABLET ORAL at 21:20

## 2023-04-29 RX ADMIN — SODIUM CHLORIDE 1000 ML: 9 INJECTION, SOLUTION INTRAVENOUS at 09:02

## 2023-04-29 RX ADMIN — CARVEDILOL 6.25 MG: 6.25 TABLET, FILM COATED ORAL at 21:20

## 2023-04-29 RX ADMIN — DIAZEPAM 5 MG: 5 TABLET ORAL at 10:13

## 2023-04-29 RX ADMIN — IOPAMIDOL 75 ML: 755 INJECTION, SOLUTION INTRAVENOUS at 08:34

## 2023-04-29 RX ADMIN — LISINOPRIL 20 MG: 20 TABLET ORAL at 15:27

## 2023-04-29 RX ADMIN — ROSUVASTATIN 10 MG: 10 TABLET, FILM COATED ORAL at 21:20

## 2023-04-29 RX ADMIN — ASPIRIN 324 MG: 81 TABLET, CHEWABLE ORAL at 10:31

## 2023-04-29 ASSESSMENT — PAIN - FUNCTIONAL ASSESSMENT: PAIN_FUNCTIONAL_ASSESSMENT: NONE - DENIES PAIN

## 2023-04-30 LAB
CHOLEST SERPL-MCNC: 144 MG/DL (ref 0–199)
DEPRECATED RDW RBC AUTO: 13.4 % (ref 12.4–15.4)
EKG ATRIAL RATE: 67 BPM
EKG DIAGNOSIS: NORMAL
EKG P AXIS: 65 DEGREES
EKG P-R INTERVAL: 214 MS
EKG Q-T INTERVAL: 400 MS
EKG QRS DURATION: 90 MS
EKG QTC CALCULATION (BAZETT): 422 MS
EKG R AXIS: -1 DEGREES
EKG T AXIS: 37 DEGREES
EKG VENTRICULAR RATE: 67 BPM
HCT VFR BLD AUTO: 40.4 % (ref 36–48)
HDLC SERPL-MCNC: 58 MG/DL (ref 40–60)
HGB BLD-MCNC: 13.5 G/DL (ref 12–16)
LDLC SERPL CALC-MCNC: 74 MG/DL
MCH RBC QN AUTO: 28.9 PG (ref 26–34)
MCHC RBC AUTO-ENTMCNC: 33.3 G/DL (ref 31–36)
MCV RBC AUTO: 86.8 FL (ref 80–100)
PLATELET # BLD AUTO: 258 K/UL (ref 135–450)
PMV BLD AUTO: 8 FL (ref 5–10.5)
RBC # BLD AUTO: 4.66 M/UL (ref 4–5.2)
TRIGL SERPL-MCNC: 59 MG/DL (ref 0–150)
VLDLC SERPL CALC-MCNC: 12 MG/DL
WBC # BLD AUTO: 5.9 K/UL (ref 4–11)

## 2023-04-30 PROCEDURE — 96372 THER/PROPH/DIAG INJ SC/IM: CPT

## 2023-04-30 PROCEDURE — 93010 ELECTROCARDIOGRAM REPORT: CPT | Performed by: INTERNAL MEDICINE

## 2023-04-30 PROCEDURE — 82306 VITAMIN D 25 HYDROXY: CPT

## 2023-04-30 PROCEDURE — 85027 COMPLETE CBC AUTOMATED: CPT

## 2023-04-30 PROCEDURE — 6370000000 HC RX 637 (ALT 250 FOR IP): Performed by: INTERNAL MEDICINE

## 2023-04-30 PROCEDURE — 82746 ASSAY OF FOLIC ACID SERUM: CPT

## 2023-04-30 PROCEDURE — 6360000002 HC RX W HCPCS: Performed by: INTERNAL MEDICINE

## 2023-04-30 PROCEDURE — 82607 VITAMIN B-12: CPT

## 2023-04-30 PROCEDURE — G0378 HOSPITAL OBSERVATION PER HR: HCPCS

## 2023-04-30 PROCEDURE — 36415 COLL VENOUS BLD VENIPUNCTURE: CPT

## 2023-04-30 PROCEDURE — 92610 EVALUATE SWALLOWING FUNCTION: CPT

## 2023-04-30 PROCEDURE — 97535 SELF CARE MNGMENT TRAINING: CPT

## 2023-04-30 PROCEDURE — 97530 THERAPEUTIC ACTIVITIES: CPT

## 2023-04-30 PROCEDURE — 92523 SPEECH SOUND LANG COMPREHEN: CPT

## 2023-04-30 PROCEDURE — 80061 LIPID PANEL: CPT

## 2023-04-30 PROCEDURE — 97162 PT EVAL MOD COMPLEX 30 MIN: CPT

## 2023-04-30 PROCEDURE — 97166 OT EVAL MOD COMPLEX 45 MIN: CPT

## 2023-04-30 RX ADMIN — METHIMAZOLE 2.5 MG: 5 TABLET ORAL at 08:53

## 2023-04-30 RX ADMIN — LISINOPRIL 20 MG: 20 TABLET ORAL at 20:53

## 2023-04-30 RX ADMIN — ENOXAPARIN SODIUM 40 MG: 100 INJECTION SUBCUTANEOUS at 08:52

## 2023-04-30 RX ADMIN — ROSUVASTATIN 10 MG: 10 TABLET, FILM COATED ORAL at 20:54

## 2023-04-30 RX ADMIN — ASPIRIN 81 MG: 81 TABLET, CHEWABLE ORAL at 20:57

## 2023-04-30 RX ADMIN — LISINOPRIL 20 MG: 20 TABLET ORAL at 08:53

## 2023-04-30 RX ADMIN — CARVEDILOL 6.25 MG: 6.25 TABLET, FILM COATED ORAL at 08:53

## 2023-04-30 RX ADMIN — CARVEDILOL 6.25 MG: 6.25 TABLET, FILM COATED ORAL at 20:54

## 2023-05-01 VITALS
SYSTOLIC BLOOD PRESSURE: 123 MMHG | BODY MASS INDEX: 27.25 KG/M2 | WEIGHT: 159.6 LBS | DIASTOLIC BLOOD PRESSURE: 77 MMHG | RESPIRATION RATE: 16 BRPM | OXYGEN SATURATION: 96 % | HEART RATE: 66 BPM | HEIGHT: 64 IN | TEMPERATURE: 97.9 F

## 2023-05-01 LAB
25(OH)D3 SERPL-MCNC: 40.8 NG/ML
ANION GAP SERPL CALCULATED.3IONS-SCNC: 8 MMOL/L (ref 3–16)
BASOPHILS # BLD: 0 K/UL (ref 0–0.2)
BASOPHILS NFR BLD: 0.6 %
BUN SERPL-MCNC: 17 MG/DL (ref 7–20)
CALCIUM SERPL-MCNC: 9.3 MG/DL (ref 8.3–10.6)
CHLORIDE SERPL-SCNC: 104 MMOL/L (ref 99–110)
CO2 SERPL-SCNC: 26 MMOL/L (ref 21–32)
CREAT SERPL-MCNC: <0.5 MG/DL (ref 0.6–1.2)
DEPRECATED RDW RBC AUTO: 13.3 % (ref 12.4–15.4)
EOSINOPHIL # BLD: 0.2 K/UL (ref 0–0.6)
EOSINOPHIL NFR BLD: 3.3 %
FOLATE SERPL-MCNC: 12.28 NG/ML (ref 4.78–24.2)
GFR SERPLBLD CREATININE-BSD FMLA CKD-EPI: >60 ML/MIN/{1.73_M2}
GLUCOSE SERPL-MCNC: 95 MG/DL (ref 70–99)
HCT VFR BLD AUTO: 42.7 % (ref 36–48)
HGB BLD-MCNC: 14 G/DL (ref 12–16)
LV EF: 58 %
LVEF MODALITY: NORMAL
LYMPHOCYTES # BLD: 2.3 K/UL (ref 1–5.1)
LYMPHOCYTES NFR BLD: 33.7 %
MCH RBC QN AUTO: 28.1 PG (ref 26–34)
MCHC RBC AUTO-ENTMCNC: 32.7 G/DL (ref 31–36)
MCV RBC AUTO: 85.9 FL (ref 80–100)
MONOCYTES # BLD: 0.5 K/UL (ref 0–1.3)
MONOCYTES NFR BLD: 8 %
NEUTROPHILS # BLD: 3.7 K/UL (ref 1.7–7.7)
NEUTROPHILS NFR BLD: 54.4 %
PLATELET # BLD AUTO: 248 K/UL (ref 135–450)
PMV BLD AUTO: 8 FL (ref 5–10.5)
POTASSIUM SERPL-SCNC: 3.8 MMOL/L (ref 3.5–5.1)
RBC # BLD AUTO: 4.98 M/UL (ref 4–5.2)
SODIUM SERPL-SCNC: 138 MMOL/L (ref 136–145)
VIT B12 SERPL-MCNC: 195 PG/ML (ref 211–911)
WBC # BLD AUTO: 6.9 K/UL (ref 4–11)

## 2023-05-01 PROCEDURE — 97530 THERAPEUTIC ACTIVITIES: CPT

## 2023-05-01 PROCEDURE — 36415 COLL VENOUS BLD VENIPUNCTURE: CPT

## 2023-05-01 PROCEDURE — 85025 COMPLETE CBC W/AUTO DIFF WBC: CPT

## 2023-05-01 PROCEDURE — 93306 TTE W/DOPPLER COMPLETE: CPT

## 2023-05-01 PROCEDURE — 6360000002 HC RX W HCPCS: Performed by: INTERNAL MEDICINE

## 2023-05-01 PROCEDURE — 96372 THER/PROPH/DIAG INJ SC/IM: CPT

## 2023-05-01 PROCEDURE — 97116 GAIT TRAINING THERAPY: CPT

## 2023-05-01 PROCEDURE — 92526 ORAL FUNCTION THERAPY: CPT

## 2023-05-01 PROCEDURE — G0378 HOSPITAL OBSERVATION PER HR: HCPCS

## 2023-05-01 PROCEDURE — 6370000000 HC RX 637 (ALT 250 FOR IP): Performed by: INTERNAL MEDICINE

## 2023-05-01 PROCEDURE — 80048 BASIC METABOLIC PNL TOTAL CA: CPT

## 2023-05-01 PROCEDURE — 99222 1ST HOSP IP/OBS MODERATE 55: CPT | Performed by: PSYCHIATRY & NEUROLOGY

## 2023-05-01 RX ORDER — CYANOCOBALAMIN 1000 UG/ML
1000 INJECTION, SOLUTION INTRAMUSCULAR; SUBCUTANEOUS
Qty: 4 ML | Refills: 0 | Status: SHIPPED | OUTPATIENT
Start: 2023-05-01 | End: 2023-05-23

## 2023-05-01 RX ORDER — CYANOCOBALAMIN 1000 UG/ML
1000 INJECTION, SOLUTION INTRAMUSCULAR; SUBCUTANEOUS
Status: DISCONTINUED | OUTPATIENT
Start: 2023-05-01 | End: 2023-05-01 | Stop reason: HOSPADM

## 2023-05-01 RX ADMIN — LISINOPRIL 20 MG: 20 TABLET ORAL at 08:51

## 2023-05-01 RX ADMIN — ENOXAPARIN SODIUM 40 MG: 100 INJECTION SUBCUTANEOUS at 08:52

## 2023-05-01 RX ADMIN — CYANOCOBALAMIN 1000 MCG: 1000 INJECTION, SOLUTION INTRAMUSCULAR; SUBCUTANEOUS at 13:54

## 2023-05-01 RX ADMIN — METHIMAZOLE 2.5 MG: 5 TABLET ORAL at 08:50

## 2023-05-01 RX ADMIN — CARVEDILOL 6.25 MG: 6.25 TABLET, FILM COATED ORAL at 08:50

## 2023-05-01 NOTE — PROGRESS NOTES
[facility]   Phone: [phone]    Physical Therapy    [] Initial Evaluation            [x] Daily Treatment Note         [] Discharge Summary      Patient: Esteban Rg   : 1960   MRN: [id number]   Date of Service:  2023  Admitting Diagnosis: Vertigo    Current Admission Summary: This is a 60 y.o. female with pertinent past medical history of CAD, high cholesterol, hypertension who was brought in by EMS transportation for dizziness. Patient states she woke up this morning at 0645, she sat up in bed to use the bathroom when she noticed that she was extremely dizzy, she fell backwards on the bed. She is unable to sit up or walk since that time due to dizziness. Associated with nausea but no vomiting. Patient also reports some intermittent double vision. Denies any history of similar symptoms. States she went to bed yesterday evening at 2300 feeling well. She denies any fevers, chills, chest pain, shortness of breath or extremity weakness/numbness. No difficulty swallowing. She takes baby aspirin daily, no other anticoagulation. .     Past Medical History:  has a past medical history of Arthritis, Breast lump or mass, CAD (coronary artery disease), Gallstones, Hyperlipidemia, Hypertension, and Thyroid disease. Past Surgical History:  has a past surgical history that includes Colonoscopy (.); Hysterectomy (); Breast surgery ([id number]); Cystoscopy (2007); Cholecystectomy, laparoscopic (2017); skin biopsy; and Tonsillectomy. Discharge Recommendations: Esteban Rg scored a 24/24 on the AM-PAC short mobility form. Current research shows that an AM-PAC score of 18 or greater is typically associated with a discharge to the patient's home setting.  Based on the patient's AM-PAC score and their current functional mobility deficits, it is recommended that the patient have 2-3 sessions per week of Physical Therapy at d/c

## 2023-05-01 NOTE — PROGRESS NOTES
Facility/Department: [address]  Speech Language Pathology   Dysphagia and Speech Language/Cognitive Treatment Note    Patient: Judge Neumann   : 1960   MRN: [id number]      Evaluation Date: 2023      Admitting Dx: Vertigo [R42]  Unable to ambulate [R26.2]  Treatment Diagnosis: Cognitive-Linguistic Deficits , Oropharyngeal Dysphagia   Pain: Did not state                                                Subjective:  Pt alert and participative with treatment session. Pt endorses return to baseline cognitive status. Dysphagia Treatment:   Diet and Treatment Recommendations 2023:  Diet Solids Recommendation:  Regular texture diet  Liquid Consistency Recommendation: Thin liquids  Recommended form of Meds: Meds whole with water     Compensatory strategies: Upright as possible with all PO intake     Assessment of Texture Tolerance:  Diet level prior to treatment: Regular texture diet , Thin liquids   Tolerance of Current Diet Level:RN reported pt appears to be tolerating current diet level      -Impressions: Pt was positioned Upright in chair, awake and alert. Currently on room air. Trials of thin liquids and regular solids  were provided to assess swallow function. Pt demonstrated functional mastication and effective oral clearing of regular solids. Pt tolerating of thin liquids with no overt clinical s/s of aspiration. Overall, swallow mechanism appears functional at this time. Based on today's assessment recommend Regular texture diet  with Thin liquids , Meds whole with water with use of compensatory swallow strategies (see above). No further dysphagia tx indicated.      Dysphagia Goals:  Pt will functionally tolerate recommended diet with no overt clinical s/s of aspiration (Goal Met 23)  Pt will demonstrate understanding of aspiration risk and precautions via education/demonstration with occasional prompting (Goal Met 23)      Speech Language/Cognitive

## 2023-05-01 NOTE — CONSULTS
dizziness, could be BPPV versus TIA. MRI brain did not show acute stroke. Patient noted to have left PCA and left vertebral artery stenosis. Maximize medical management with aspirin and statin. Echo is pending  Hypertension  Hyperlipidemia  Thyroid disease    Recommendation:    Echo pending  Aspirin  Statin  Hydration  Check orthostatics  Monitor and control blood pressure. Continue current blood pressure medication  Continue methimazole  Vestibular precautions  Neurochecks  Telemetry  GI and DVT prophylaxis  PT and OT  Speech  Continue supportive care  Discharge planning when medically stable      Thank you for referring such patient. If you have any questions regarding my consult note, please don't hesitate to call me. Juliette Juan, APRN - CNP    [phone]    Attending Supervising [de-identified] Attestation Statement      The patient was seen 5/1/2023 in conjunction with the nurse practitioner with independent history, evaluation and examination. I agree with the note which has been adjusted to reflect my findings, with the addition of the following: The patient is 60 y.o.  female  who was admitted for Acute dizziness. Symptoms started hours prior to admission. Description severe spinning sensation with nausea and ataxia at home. Degree was severe. Duration was hours. No triggers or other relieving factor. She came to the ED. Initial imaging showed no severe LVO or new stroke. She was admitted. Today she feels back to her baseline. MRI of the brain showed no acute findings    On examination:  No acute distress  Awake and alert x3. Fluent speech. Appears appropriate with intact recent and remote memory. Pupil reactive and symmetric, extraocular motor intact, no ophthalmoplegia, face is symmetric and tongue is midline  No focal weakness with symmetric DTR  Normal tone  No sensory disturbance or abnormal movement    Impression:  Acute vertigo likely peripheral vertigo.   Agree with

## 2023-05-01 NOTE — DISCHARGE SUMMARY
Hospital Medicine Discharge Summary    Patient ID: Esteban Rg      Patient's PCP: Lore Lizarraga MD    Admit Date: 4/29/2023     Discharge Date: 5/1/2023     Admitting Physician: Tash Elizalde MD     Discharge Physician: Dorota Brumfield MD        Hospital Course: This 71-year-old female with PMHx of CAD s/p PCI& stent, hypertension, hyperlipidemia, subclinical hyperthyroidism presented with dizziness. Dizziness; likely peripheral vertigo; resolved  CT head negative for any acute intracranial pathology. CTA head and neck negative for any evident cause large vessel occlusion; showed mild stenosis of P2 segment along with severe stenosis at the region of left vertebral artery. Neurology consulted; underwent MRI brain which showed no evidence of any acute infarct. Echo unremarkable. Hx of CAD; s/p PCI& stents; stable. Denies any chest pain  Follows with cardiology. Continue aspirin, statins and beta-blockers     Hypertension; continue current regimen monitor BP closely     Hyperlipidemia; continue statins     Hx of subclinical hyperthyroidism; follows with endocrinology. Continue methimazole     Thyroid nodule. Imaging showed indeterminate 2.5 cm left lobe thyroid nodule; previously noted on thyroid ultrasound on 12/28/2022. Follow-up ultrasound recommended as outpatient   Patient with known history of multinodular goiter; follows with endocrinology. Obesity; follows with endocrinology. On Ozempic      Physical Exam Performed:     /77   Pulse 66   Temp 97.9 °F (36.6 °C) (Oral)   Resp 16   Ht 5' 4.25\" (1.632 m)   Wt 159 lb 9.6 oz (72.4 kg)   SpO2 96%   BMI 27.18 kg/m²     General appearance: No apparent distress, appears stated age and cooperative. Eyes: Sclera clear. Pupils equal.  ENT: Moist oral mucosa. Trachea midline, no adenopathy. Cardiovascular: Regular rhythm, normal S1, S2. No murmur. Respiratory: Clear to auscultation bilaterally, no wheeze or crackles.    GI:

## 2023-05-01 NOTE — CARE COORDINATION
CM met with pt and discussed PT recommendation of home care. Pt declines.     Sandra Mccollum RN, BSN  [phone]

## 2023-05-01 NOTE — PLAN OF CARE
Shift assessment complete. BP elevated but all other vital signs stable. HR 62 Sinus rhythm on telemetry. Pt reports no dizziness. Respirations even and unlabored. Medications given per MAR. Plan of care discussed with patient. Problem: Discharge Planning  Goal: Discharge to home or other facility with appropriate resources  4/30/2023 2234 by Cindi Hilliard, RN  Outcome: Progressing  Flowsheets (Taken 4/30/2023 2050)  Discharge to home or other facility with appropriate resources: Identify barriers to discharge with patient and caregiver     Problem: Safety - Adult  Goal: Free from fall injury  4/30/2023 2234 by Cindi Hilliard, RN  Outcome: Progressing   Pt denies any further needs at this time. Call light within reach.

## 2023-05-01 NOTE — PROGRESS NOTES
CLINICAL PHARMACY NOTE: MEDS TO BEDS    Total # of Prescriptions Filled: 2   The following medications were delivered to the patient:  Vitamin B12 soln  Syringes     Additional Documentation:  Delivered to patient=signed  Ok to be delivered per Cosmo Gonzalez CPhT

## 2023-05-01 NOTE — CARE COORDINATION
Patient discharged 5/1/2023 to home  All discharge needs met per case management     Adalgisa Thompson RN, BSN  [phone]

## 2023-05-01 NOTE — DISCHARGE INSTRUCTIONS
Follow up with your PCP within 7-10 days of discharge. Have PCP check vitamin B12 levels and adjust supplements dose if needed  Follow-up with your endocrinologist; outpatient thyroid ultrasound recommended  Follow up with neurology as instructed   Take all your medications as prescribed  Your information:  Name: Milton Ngo  : 1960    Your Discharge Instructions    What to do after you leave the hospital:    Read, review and familiarize yourself with the information provided below and in a separate packet on   Vertigo; Lightheadedness or Fainting; Vertigo Exercises    Diet: Low Sodium     Recommended activity:   as tolerated  Avoid strenuous activity until instructed by your physician to resume; balance rest with periods of light to normal activity. If you experience any of the following: Unusual or inadequately controlled pain; unusual transient shortness of breath; recurrent or persistent nausea, heartburn, palpitations or lightheadedness; increased swelling; increased fatigue; fever >100; please follow up with @PCP@ [unfilled] or go to the Emergency Room. Home Health/ Outpatient Services: ***      Information obtained by:  By signing below, I understand and acknowledge receipt of the instructions indicated above, and I understand that if any problems occur once I leave the hospital I am to contact @[email or web address]@.

## 2023-05-02 ENCOUNTER — TELEPHONE (OUTPATIENT)
Dept: FAMILY MEDICINE CLINIC | Age: 63
End: 2023-05-02

## 2023-05-02 NOTE — TELEPHONE ENCOUNTER
Care Transitions Initial Follow Up Call    Outreach made within 2 business days of discharge: Yes    Patient: Kelly Portillo Patient : 1960   MRN: [id number]  Reason for Admission: There are no discharge diagnoses documented for the most recent discharge. Discharge Date: 23       Spoke with: Pt     Discharge department/facility: [facility]    TCM Interactive Patient Contact:  Was patient able to fill all prescriptions: Yes  Was patient instructed to bring all medications to the follow-up visit: Yes  Is patient taking all medications as directed in the discharge summary?  Yes  Does patient understand their discharge instructions: Yes  Does patient have questions or concerns that need addressed prior to 7-14 day follow up office visit: no    Scheduled appointment with PCP within 7-14 days    Follow Up  Future Appointments   Date Time Provider Celestine Dominguez   5/10/2023 11:30 AM MD SHAHANA GarzaALE FP [place] [facility]   2023  9:20 AM Unique Malhotra MD FF SLEEP MED [facility]   2023 10:20 AM Ivis Randall MD FF ENDO [facility]   2023 10:45 AM Reza Jeff MD FF Cardio [facility]       Nir Fofana MA

## 2023-05-10 ENCOUNTER — OFFICE VISIT (OUTPATIENT)
Dept: FAMILY MEDICINE CLINIC | Age: 63
End: 2023-05-10

## 2023-05-10 VITALS
DIASTOLIC BLOOD PRESSURE: 84 MMHG | SYSTOLIC BLOOD PRESSURE: 160 MMHG | HEART RATE: 62 BPM | OXYGEN SATURATION: 99 % | BODY MASS INDEX: 28.2 KG/M2 | HEIGHT: 64 IN | WEIGHT: 165.2 LBS | RESPIRATION RATE: 16 BRPM

## 2023-05-10 DIAGNOSIS — J44.9 OBSTRUCTIVE AIRWAY DISEASE (HCC): ICD-10-CM

## 2023-05-10 DIAGNOSIS — Z09 HOSPITAL DISCHARGE FOLLOW-UP: Primary | ICD-10-CM

## 2023-05-10 DIAGNOSIS — I10 WHITE COAT SYNDROME WITH DIAGNOSIS OF HYPERTENSION: ICD-10-CM

## 2023-05-10 DIAGNOSIS — I25.10 CORONARY ARTERY DISEASE INVOLVING NATIVE CORONARY ARTERY OF NATIVE HEART WITHOUT ANGINA PECTORIS: Chronic | ICD-10-CM

## 2023-05-10 DIAGNOSIS — R42 VERTIGO: ICD-10-CM

## 2023-05-10 DIAGNOSIS — I25.119 ATHEROSCLEROSIS OF NATIVE CORONARY ARTERY OF NATIVE HEART WITH ANGINA PECTORIS (HCC): ICD-10-CM

## 2023-05-10 DIAGNOSIS — E04.2 MULTINODULAR GOITER: ICD-10-CM

## 2023-05-10 DIAGNOSIS — E53.8 VITAMIN B 12 DEFICIENCY: ICD-10-CM

## 2023-05-10 DIAGNOSIS — I20.9 ANGINA PECTORIS, UNSPECIFIED (HCC): ICD-10-CM

## 2023-05-10 DIAGNOSIS — E11.59 TYPE 2 DIABETES MELLITUS WITH OTHER CIRCULATORY COMPLICATION, WITHOUT LONG-TERM CURRENT USE OF INSULIN (HCC): ICD-10-CM

## 2023-05-10 DIAGNOSIS — I27.20 PULMONARY HYPERTENSION (HCC): ICD-10-CM

## 2023-05-10 PROBLEM — E11.9 TYPE 2 DIABETES MELLITUS (HCC): Status: ACTIVE | Noted: 2023-05-10

## 2023-05-10 SDOH — ECONOMIC STABILITY: HOUSING INSECURITY
IN THE LAST 12 MONTHS, WAS THERE A TIME WHEN YOU DID NOT HAVE A STEADY PLACE TO SLEEP OR SLEPT IN A SHELTER (INCLUDING NOW)?: NO

## 2023-05-10 SDOH — ECONOMIC STABILITY: INCOME INSECURITY: HOW HARD IS IT FOR YOU TO PAY FOR THE VERY BASICS LIKE FOOD, HOUSING, MEDICAL CARE, AND HEATING?: NOT HARD AT ALL

## 2023-05-10 SDOH — ECONOMIC STABILITY: FOOD INSECURITY: WITHIN THE PAST 12 MONTHS, THE FOOD YOU BOUGHT JUST DIDN'T LAST AND YOU DIDN'T HAVE MONEY TO GET MORE.: NEVER TRUE

## 2023-05-10 SDOH — ECONOMIC STABILITY: FOOD INSECURITY: WITHIN THE PAST 12 MONTHS, YOU WORRIED THAT YOUR FOOD WOULD RUN OUT BEFORE YOU GOT MONEY TO BUY MORE.: NEVER TRUE

## 2023-05-10 ASSESSMENT — PATIENT HEALTH QUESTIONNAIRE - PHQ9
2. FEELING DOWN, DEPRESSED OR HOPELESS: 0
SUM OF ALL RESPONSES TO PHQ QUESTIONS 1-9: 0
1. LITTLE INTEREST OR PLEASURE IN DOING THINGS: 0
SUM OF ALL RESPONSES TO PHQ9 QUESTIONS 1 & 2: 0

## 2023-05-10 NOTE — PROGRESS NOTES
Post-Discharge Transitional Care  Follow Up      Lico Valero   YOB: 1960    Date of Office Visit:  5/10/2023  Date of Hospital Admission: 4/29/23  Date of Hospital Discharge: 5/1/23  Risk of hospital readmission (high >=14%. Medium >=10%) :No data recorded    Care management risk score Rising risk (score 2-5) and Complex Care (Scores >=6): No Risk Score On File     Non face to face  following discharge, date last encounter closed (first attempt may have been earlier): 05/02/2023    Call initiated 2 business days of discharge: Yes    ASSESSMENT/PLAN:   Hospital discharge follow-up  Imaging and labs reviewed  Discharge summary reviewed  Seen by neurology, she did not have a stroke but is at increased risk  Atypical vertigo is a possibility also  -     AK DISCHARGE MEDS RECONCILED W/ CURRENT OUTPATIENT MED LIST    Vertigo  Refer to vestibular specialist for therapy  -     External Referral To Physical Therapy    White coat syndrome with diagnosis of hypertension  Systolic pressure elevated today, is normal at home    Coronary artery disease involving native coronary artery of native heart   without angina pectoris  Stable on medication Coreg 6.25 mg twice daily, aspirin, statin, lisinopril twice daily    Multinodular goiter  Follows up with endocrine Dr. Piña Jatinder    Obstructive airway disease ([facility] [address]  Stable without inhaler    Pulmonary hypertension ([facility] [address]  Monitor by cardiology    Vitamin B 12 deficiency  Getting weekly B12 injection  -     Vitamin B12 & Folate; Future    Type 2 diabetes mellitus with other circulatory complication, without long-term current use of insulin (HCC)  Stable on semaglutide, A1c pending    Angina pectoris, unspecified  Stable on meds aspirin, statin, Coreg 6.25 mg twice daily  Atherosclerosis of native coronary artery of native heart with angina pectoris (HCC)  Stable on meds as above      Medical Decision Making: moderate complexity  No follow-ups on file.     On this date

## 2023-06-01 DIAGNOSIS — R73.03 PREDIABETES: Primary | ICD-10-CM

## 2023-06-01 DIAGNOSIS — E66.9 OBESITY, UNSPECIFIED CLASSIFICATION, UNSPECIFIED OBESITY TYPE, UNSPECIFIED WHETHER SERIOUS COMORBIDITY PRESENT: ICD-10-CM

## 2023-06-05 RX ORDER — SEMAGLUTIDE 1.34 MG/ML
INJECTION, SOLUTION SUBCUTANEOUS
Qty: 9 ML | Refills: 0 | Status: SHIPPED | OUTPATIENT
Start: 2023-06-05

## 2023-06-16 DIAGNOSIS — E53.8 VITAMIN B 12 DEFICIENCY: ICD-10-CM

## 2023-06-16 DIAGNOSIS — E05.90 HYPERTHYROIDISM: ICD-10-CM

## 2023-06-16 DIAGNOSIS — R73.03 PREDIABETES: ICD-10-CM

## 2023-06-16 DIAGNOSIS — E66.9 OBESITY, UNSPECIFIED CLASSIFICATION, UNSPECIFIED OBESITY TYPE, UNSPECIFIED WHETHER SERIOUS COMORBIDITY PRESENT: ICD-10-CM

## 2023-06-16 DIAGNOSIS — Z78.0 POSTMENOPAUSAL: ICD-10-CM

## 2023-06-16 LAB
ALBUMIN SERPL-MCNC: 4.3 G/DL (ref 3.4–5)
ALBUMIN/GLOB SERPL: 1.8 {RATIO} (ref 1.1–2.2)
ALP SERPL-CCNC: 55 U/L (ref 40–129)
ALT SERPL-CCNC: 16 U/L (ref 10–40)
ANION GAP SERPL CALCULATED.3IONS-SCNC: 11 MMOL/L (ref 3–16)
AST SERPL-CCNC: 16 U/L (ref 15–37)
BILIRUB SERPL-MCNC: 0.4 MG/DL (ref 0–1)
BUN SERPL-MCNC: 26 MG/DL (ref 7–20)
CALCIUM SERPL-MCNC: 9.3 MG/DL (ref 8.3–10.6)
CHLORIDE SERPL-SCNC: 106 MMOL/L (ref 99–110)
CO2 SERPL-SCNC: 28 MMOL/L (ref 21–32)
CREAT SERPL-MCNC: <0.5 MG/DL (ref 0.6–1.2)
FOLATE SERPL-MCNC: 19.64 NG/ML (ref 4.78–24.2)
GFR SERPLBLD CREATININE-BSD FMLA CKD-EPI: >60 ML/MIN/{1.73_M2}
GLUCOSE SERPL-MCNC: 80 MG/DL (ref 70–99)
POTASSIUM SERPL-SCNC: 4.1 MMOL/L (ref 3.5–5.1)
PROT SERPL-MCNC: 6.7 G/DL (ref 6.4–8.2)
SODIUM SERPL-SCNC: 145 MMOL/L (ref 136–145)
T3 SERPL-MCNC: 1.41 NG/ML (ref 0.8–2)
T4 FREE SERPL-MCNC: 1 NG/DL (ref 0.9–1.8)
TSH SERPL DL<=0.005 MIU/L-ACNC: 0.36 UIU/ML (ref 0.27–4.2)
VIT B12 SERPL-MCNC: 338 PG/ML (ref 211–911)

## 2023-06-17 LAB
EST. AVERAGE GLUCOSE BLD GHB EST-MCNC: 111.2 MG/DL
HBA1C MFR BLD: 5.5 %

## 2023-06-18 RX ORDER — CYANOCOBALAMIN 1000 UG/ML
1000 INJECTION, SOLUTION INTRAMUSCULAR; SUBCUTANEOUS
Qty: 4 ML | Refills: 0 | Status: SHIPPED | OUTPATIENT
Start: 2023-06-18 | End: 2023-07-10

## 2023-06-27 ENCOUNTER — OFFICE VISIT (OUTPATIENT)
Dept: ENDOCRINOLOGY | Age: 63
End: 2023-06-27
Payer: COMMERCIAL

## 2023-06-27 VITALS
HEART RATE: 58 BPM | SYSTOLIC BLOOD PRESSURE: 154 MMHG | BODY MASS INDEX: 27.14 KG/M2 | RESPIRATION RATE: 14 BRPM | TEMPERATURE: 98 F | HEIGHT: 64 IN | WEIGHT: 159 LBS | DIASTOLIC BLOOD PRESSURE: 82 MMHG

## 2023-06-27 DIAGNOSIS — E66.9 NON MORBID OBESITY, UNSPECIFIED OBESITY TYPE: Chronic | ICD-10-CM

## 2023-06-27 DIAGNOSIS — E04.2 MULTINODULAR GOITER: Primary | ICD-10-CM

## 2023-06-27 DIAGNOSIS — I25.119 ATHEROSCLEROSIS OF NATIVE CORONARY ARTERY OF NATIVE HEART WITH ANGINA PECTORIS (HCC): ICD-10-CM

## 2023-06-27 PROCEDURE — 3079F DIAST BP 80-89 MM HG: CPT | Performed by: INTERNAL MEDICINE

## 2023-06-27 PROCEDURE — 99214 OFFICE O/P EST MOD 30 MIN: CPT | Performed by: INTERNAL MEDICINE

## 2023-06-27 PROCEDURE — 3077F SYST BP >= 140 MM HG: CPT | Performed by: INTERNAL MEDICINE

## 2023-06-27 RX ORDER — SEMAGLUTIDE 2.68 MG/ML
INJECTION, SOLUTION SUBCUTANEOUS
Qty: 3 ML | Refills: 3 | Status: SHIPPED | OUTPATIENT
Start: 2023-06-27

## 2023-07-07 ENCOUNTER — OFFICE VISIT (OUTPATIENT)
Dept: FAMILY MEDICINE CLINIC | Age: 63
End: 2023-07-07
Payer: COMMERCIAL

## 2023-07-07 VITALS
HEIGHT: 64 IN | BODY MASS INDEX: 28.07 KG/M2 | DIASTOLIC BLOOD PRESSURE: 82 MMHG | SYSTOLIC BLOOD PRESSURE: 134 MMHG | WEIGHT: 164.4 LBS | HEART RATE: 65 BPM | RESPIRATION RATE: 16 BRPM | OXYGEN SATURATION: 97 %

## 2023-07-07 DIAGNOSIS — N63.0 MASS OF BREAST, UNSPECIFIED LATERALITY: ICD-10-CM

## 2023-07-07 DIAGNOSIS — I10 WHITE COAT SYNDROME WITH DIAGNOSIS OF HYPERTENSION: Primary | ICD-10-CM

## 2023-07-07 DIAGNOSIS — E53.8 B12 DEFICIENCY: ICD-10-CM

## 2023-07-07 DIAGNOSIS — I66.22 OCCLUSION AND STENOSIS OF LEFT POSTERIOR CEREBRAL ARTERY: ICD-10-CM

## 2023-07-07 DIAGNOSIS — E11.59 TYPE 2 DIABETES MELLITUS WITH OTHER CIRCULATORY COMPLICATION, WITHOUT LONG-TERM CURRENT USE OF INSULIN (HCC): ICD-10-CM

## 2023-07-07 DIAGNOSIS — I25.10 CORONARY ARTERY DISEASE INVOLVING NATIVE CORONARY ARTERY OF NATIVE HEART WITHOUT ANGINA PECTORIS: Chronic | ICD-10-CM

## 2023-07-07 DIAGNOSIS — U07.1 COVID-19 VIRUS INFECTION: ICD-10-CM

## 2023-07-07 DIAGNOSIS — I65.02 STENOSIS OF LEFT VERTEBRAL ARTERY: ICD-10-CM

## 2023-07-07 DIAGNOSIS — R42 VERTIGO: ICD-10-CM

## 2023-07-07 PROCEDURE — 3044F HG A1C LEVEL LT 7.0%: CPT | Performed by: FAMILY MEDICINE

## 2023-07-07 PROCEDURE — 3075F SYST BP GE 130 - 139MM HG: CPT | Performed by: FAMILY MEDICINE

## 2023-07-07 PROCEDURE — 99214 OFFICE O/P EST MOD 30 MIN: CPT | Performed by: FAMILY MEDICINE

## 2023-07-07 PROCEDURE — 3078F DIAST BP <80 MM HG: CPT | Performed by: FAMILY MEDICINE

## 2023-07-07 ASSESSMENT — PATIENT HEALTH QUESTIONNAIRE - PHQ9
2. FEELING DOWN, DEPRESSED OR HOPELESS: 0
SUM OF ALL RESPONSES TO PHQ QUESTIONS 1-9: 0
SUM OF ALL RESPONSES TO PHQ QUESTIONS 1-9: 0
SUM OF ALL RESPONSES TO PHQ9 QUESTIONS 1 & 2: 0
SUM OF ALL RESPONSES TO PHQ QUESTIONS 1-9: 0
SUM OF ALL RESPONSES TO PHQ QUESTIONS 1-9: 0
1. LITTLE INTEREST OR PLEASURE IN DOING THINGS: 0

## 2023-07-07 NOTE — PROGRESS NOTES
Visual inspection:  Deformity/amputation: absent  Skin lesions/pre-ulcerative calluses: absent  Edema: right- negative, left- negative    Sensory exam:  Monofilament sensation: normal  (minimum of 5 random plantar locations tested, avoiding callused areas - > 1 area with absence of sensation is + for neuropathy)    Pulses: normal,
addition to coordinating care for this patient and documenting in electronic health record

## 2023-07-24 RX ORDER — CARVEDILOL 6.25 MG/1
6.25 TABLET ORAL 2 TIMES DAILY
Qty: 180 TABLET | Refills: 0 | Status: SHIPPED | OUTPATIENT
Start: 2023-07-24

## 2023-08-07 ENCOUNTER — OFFICE VISIT (OUTPATIENT)
Dept: PULMONOLOGY | Age: 63
End: 2023-08-07
Payer: COMMERCIAL

## 2023-08-07 VITALS
DIASTOLIC BLOOD PRESSURE: 60 MMHG | SYSTOLIC BLOOD PRESSURE: 138 MMHG | WEIGHT: 167.4 LBS | BODY MASS INDEX: 28.58 KG/M2 | HEART RATE: 63 BPM | OXYGEN SATURATION: 95 % | HEIGHT: 64 IN

## 2023-08-07 DIAGNOSIS — G47.33 OSA ON CPAP: Primary | Chronic | ICD-10-CM

## 2023-08-07 DIAGNOSIS — I25.10 CORONARY ARTERY DISEASE INVOLVING NATIVE CORONARY ARTERY OF NATIVE HEART WITHOUT ANGINA PECTORIS: Chronic | ICD-10-CM

## 2023-08-07 DIAGNOSIS — E11.59 TYPE 2 DIABETES MELLITUS WITH OTHER CIRCULATORY COMPLICATION, WITHOUT LONG-TERM CURRENT USE OF INSULIN (HCC): Chronic | ICD-10-CM

## 2023-08-07 DIAGNOSIS — Z99.89 OSA ON CPAP: Primary | Chronic | ICD-10-CM

## 2023-08-07 PROBLEM — E11.9 TYPE 2 DIABETES MELLITUS (HCC): Chronic | Status: ACTIVE | Noted: 2023-05-10

## 2023-08-07 PROCEDURE — 3075F SYST BP GE 130 - 139MM HG: CPT | Performed by: INTERNAL MEDICINE

## 2023-08-07 PROCEDURE — 3078F DIAST BP <80 MM HG: CPT | Performed by: INTERNAL MEDICINE

## 2023-08-07 PROCEDURE — 3044F HG A1C LEVEL LT 7.0%: CPT | Performed by: INTERNAL MEDICINE

## 2023-08-07 PROCEDURE — 99214 OFFICE O/P EST MOD 30 MIN: CPT | Performed by: INTERNAL MEDICINE

## 2023-08-07 ASSESSMENT — SLEEP AND FATIGUE QUESTIONNAIRES
HOW LIKELY ARE YOU TO NOD OFF OR FALL ASLEEP WHILE SITTING INACTIVE IN A PUBLIC PLACE: 0
ESS TOTAL SCORE: 5
HOW LIKELY ARE YOU TO NOD OFF OR FALL ASLEEP WHEN YOU ARE A PASSENGER IN A CAR FOR AN HOUR WITHOUT A BREAK: 0
HOW LIKELY ARE YOU TO NOD OFF OR FALL ASLEEP IN A CAR, WHILE STOPPED FOR A FEW MINUTES IN TRAFFIC: 0
HOW LIKELY ARE YOU TO NOD OFF OR FALL ASLEEP WHILE SITTING AND READING: 2
HOW LIKELY ARE YOU TO NOD OFF OR FALL ASLEEP WHILE WATCHING TV: 1
HOW LIKELY ARE YOU TO NOD OFF OR FALL ASLEEP WHILE SITTING QUIETLY AFTER LUNCH WITHOUT ALCOHOL: 1
HOW LIKELY ARE YOU TO NOD OFF OR FALL ASLEEP WHILE LYING DOWN TO REST IN THE AFTERNOON WHEN CIRCUMSTANCES PERMIT: 1
HOW LIKELY ARE YOU TO NOD OFF OR FALL ASLEEP WHILE SITTING AND TALKING TO SOMEONE: 0

## 2023-08-07 NOTE — PROGRESS NOTES
Gaby Bryson MD  [facility] BRIDGET Fernandez CNP [facility] [address] [facility] [address], [address] [facility] [phone]    Grover Nguyen [address] [phone] [place] [address]  Dept: [phone]  Dept Fax: [phone]  Loc: [phone]      Assessment/Plan:      1. ARLENE on CPAP  Assessment & Plan:  Chronic-not Stable: Reviewed and analyzed results of physiologic download from patient's machine and reviewed with patient. Supplies and parts as needed for her machine. These are medically necessary. Limit caffeine use after 3pm.  Despite over 30 days of AutoPap with compliant usage, well-fitting mask, minimal leak, previous increase in pressure, and an AHI less than 1 the patient still not sleeping well and not able to maintain sleep and having more issues with hypersomnolence. She needs an in lab titration to assess for sleep apnea is fully controlled and sigmoidoscopy fragmenting her sleep and leaving her sleepy during the day. Orders:  -     Sleep Study with PAP Titration; Future  2. Coronary artery disease involving native coronary artery of native heart without angina pectoris  Assessment & Plan:  Chronic- Stable. Discussed the importance of treating sleep apnea as part of the management of this disorder. Cont any meds per PCP and other physicians. 3. Type 2 diabetes mellitus with other circulatory complication, without long-term current use of insulin (HCC)  Assessment & Plan:  Chronic- Stable. Discussed the importance of treating sleep apnea as part of the management of this disorder. Cont any meds per PCP and other physicians. Reviewed, analyzed, and documented physiologic data from patient's PAP machine.     This information was analyzed to assess complexity and medical decision making in regards to further testing

## 2023-08-07 NOTE — ASSESSMENT & PLAN NOTE
Chronic-not Stable: Reviewed and analyzed results of physiologic download from patient's machine and reviewed with patient. Supplies and parts as needed for her machine. These are medically necessary. Limit caffeine use after 3pm.  Despite over 30 days of AutoPap with compliant usage, well-fitting mask, minimal leak, previous increase in pressure, and an AHI less than 1 the patient still not sleeping well and not able to maintain sleep and having more issues with hypersomnolence. She needs an in lab titration to assess for sleep apnea is fully controlled and sigmoidoscopy fragmenting her sleep and leaving her sleepy during the day.

## 2023-08-08 ENCOUNTER — TELEPHONE (OUTPATIENT)
Dept: SLEEP CENTER | Age: 63
End: 2023-08-08

## 2023-08-08 NOTE — TELEPHONE ENCOUNTER
Called to schedule a CPAP per Sanchez Salgado  for the pt to return my call     [employer]

## 2023-09-08 ENCOUNTER — HOSPITAL ENCOUNTER (OUTPATIENT)
Dept: SLEEP CENTER | Age: 63
Discharge: HOME | End: 2023-09-08

## 2023-09-08 DIAGNOSIS — Z99.89 OSA ON CPAP: Chronic | ICD-10-CM

## 2023-09-08 DIAGNOSIS — G47.33 OSA ON CPAP: Chronic | ICD-10-CM

## 2023-09-13 ENCOUNTER — TELEPHONE (OUTPATIENT)
Dept: PULMONOLOGY | Age: 63
End: 2023-09-13

## 2023-09-13 NOTE — TELEPHONE ENCOUNTER
Spoke with pt to review titration study results. Order to be sent to [facility]. Pt to schedule f/u.

## 2023-09-14 NOTE — TELEPHONE ENCOUNTER
Called pt to schedule her 31-90 FU with Dr. Yodit Porter. Only available time slot within that frame was for Dec. 4th at 12pm. Pt agreed to come in during that time frame.  Per notes, could only be scheduled with MD.

## 2023-09-26 DIAGNOSIS — I25.10 CORONARY ARTERY DISEASE INVOLVING NATIVE CORONARY ARTERY OF NATIVE HEART WITHOUT ANGINA PECTORIS: Chronic | ICD-10-CM

## 2023-09-26 DIAGNOSIS — E53.8 B12 DEFICIENCY: Primary | ICD-10-CM

## 2023-09-26 RX ORDER — ROSUVASTATIN CALCIUM 20 MG/1
20 TABLET, COATED ORAL NIGHTLY
Qty: 90 TABLET | Refills: 3 | Status: SHIPPED | OUTPATIENT
Start: 2023-09-26

## 2023-09-27 NOTE — PROGRESS NOTES
[address] [facility]   Cardiac Evaluation      Patient: Lindsey Serna  YOB: 1960         No chief complaint on file. Referring provider: Martin Romano MD    History of Present Illness:   Lindsey Serna is a 58 y.o. female here to follow up on CAD, s/ p PCI, Htn, Hld, ARLENE. She did have another stress test in Feb d/t chest pain. Stress was normal and she was cleared to go back to cardiac rehab. Today she states she is doing well. Occasional  chest pains during exercise that go away at rest.  No shortness of breath, she reoprts this is not like her anginal pain prior to stenting. She has lost weight  She has also been watching her diet. She needs to have thyroid surgery. She was seen in [facility] in June and found out she is allergic to Methimazole. She was also ruled out for a stroke during that admission. With regard to medication therapy he/she has been compliant with prescribed regimen and has tolerated therapy to date. Past Medical History:   has a past medical history of Arthritis, Breast lump or mass, CAD (coronary artery disease), Gallstones, Hyperlipidemia, Hypertension, Thyroid disease, and Type 2 diabetes mellitus. Surgical History:   has a past surgical history that includes Colonoscopy (8.1.2010); Hysterectomy (2007); Breast surgery (1314,9929,1968); Cystoscopy (7.19.2007); Cholecystectomy, laparoscopic (02/03/2017); skin biopsy; and Tonsillectomy.      Current Outpatient Medications   Medication Sig Dispense Refill    rosuvastatin (CRESTOR) 20 MG tablet Take 1 tablet by mouth nightly 90 tablet 3    carvedilol (COREG) 6.25 MG tablet TAKE 1 TABLET BY MOUTH IN THE MORNING AND 1 TABLET BEFORE BEDTIME 180 tablet 0    Semaglutide, 2 MG/DOSE, (OZEMPIC, 2 MG/DOSE,) 8 MG/3ML SOPN 2 mg weekly 3 mL 3    cyanocobalamin 1000 MCG/ML injection Inject 1 mL into the muscle every 7 days for 4 doses 4 mL 0    methIMAzole (TAPAZOLE) 5 MG tablet Take 1 tablet by mouth once daily

## 2023-09-28 DIAGNOSIS — E53.8 B12 DEFICIENCY: ICD-10-CM

## 2023-09-28 DIAGNOSIS — I25.10 CORONARY ARTERY DISEASE INVOLVING NATIVE CORONARY ARTERY OF NATIVE HEART WITHOUT ANGINA PECTORIS: Chronic | ICD-10-CM

## 2023-09-28 LAB
CHOLEST SERPL-MCNC: 115 MG/DL (ref 0–199)
FOLATE SERPL-MCNC: 14.08 NG/ML (ref 4.78–24.2)
HDLC SERPL-MCNC: 63 MG/DL (ref 40–60)
LDLC SERPL CALC-MCNC: 41 MG/DL
TRIGL SERPL-MCNC: 57 MG/DL (ref 0–150)
VIT B12 SERPL-MCNC: 434 PG/ML (ref 211–911)
VLDLC SERPL CALC-MCNC: 11 MG/DL

## 2023-09-28 NOTE — PROGRESS NOTES
[address] [facility]   Cardiac Evaluation      Patient: Marta Gonzales  YOB: 1960         Chief Complaint   Patient presents with    Hyperlipidemia     Pt denies cardiac symptoms at this time. Coronary Artery Disease    Hypertension          Referring provider: Eri Zabala MD    History of Present Illness:   Marta Gonzales is a 58 y.o. female here to follow up on CAD, s/ p PCI, Htn, Hld, ARLENE. Today she is here alone. She was hospitalized 5/2023 with dizziness. She is working with neurology and sleep medicine to resolve symptoms. Denies chest pain/pressure/squeezing/tightness, dizziness/lightheadedness, shortness of breath/dyspnea on exertion. With regard to medication therapy he/she has been compliant with prescribed regimen and has tolerated therapy to date. Past Medical History:   has a past medical history of Arthritis, Breast lump or mass, CAD (coronary artery disease), Gallstones, Hyperlipidemia, Hypertension, Sleep apnea, Thyroid disease, and Type 2 diabetes mellitus. Surgical History:   has a past surgical history that includes Colonoscopy (08/01/2010); Hysterectomy (2007); Breast surgery (9989,9705,2897); Cystoscopy (07/19/2007); Cholecystectomy, laparoscopic (02/03/2017); skin biopsy; Tonsillectomy; and Percutaneous Transluminal Coronary Angio (11/21).      Current Outpatient Medications   Medication Sig Dispense Refill    nitroGLYCERIN (NITROSTAT) 0.4 MG SL tablet Place 1 tablet under the tongue every 5 minutes as needed for Chest pain 25 tablet 3    rosuvastatin (CRESTOR) 20 MG tablet Take 1 tablet by mouth nightly 90 tablet 3    carvedilol (COREG) 6.25 MG tablet TAKE 1 TABLET BY MOUTH IN THE MORNING AND 1 TABLET BEFORE BEDTIME 180 tablet 0    Semaglutide, 2 MG/DOSE, (OZEMPIC, 2 MG/DOSE,) 8 MG/3ML SOPN 2 mg weekly 3 mL 3    cyanocobalamin 1000 MCG/ML injection Inject 1 mL into the muscle every 7 days for 4 doses 4 mL 0    methIMAzole (TAPAZOLE) 5 MG tablet

## 2023-09-29 ENCOUNTER — OFFICE VISIT (OUTPATIENT)
Dept: CARDIOLOGY CLINIC | Age: 63
End: 2023-09-29
Payer: COMMERCIAL

## 2023-09-29 VITALS
WEIGHT: 166.6 LBS | HEIGHT: 64 IN | DIASTOLIC BLOOD PRESSURE: 80 MMHG | BODY MASS INDEX: 28.44 KG/M2 | OXYGEN SATURATION: 98 % | HEART RATE: 58 BPM | SYSTOLIC BLOOD PRESSURE: 138 MMHG

## 2023-09-29 DIAGNOSIS — I10 PRIMARY HYPERTENSION: ICD-10-CM

## 2023-09-29 DIAGNOSIS — I25.10 CORONARY ARTERY DISEASE INVOLVING NATIVE CORONARY ARTERY OF NATIVE HEART WITHOUT ANGINA PECTORIS: Primary | Chronic | ICD-10-CM

## 2023-09-29 DIAGNOSIS — E78.2 MIXED HYPERLIPIDEMIA: Chronic | ICD-10-CM

## 2023-09-29 PROCEDURE — 99214 OFFICE O/P EST MOD 30 MIN: CPT | Performed by: INTERNAL MEDICINE

## 2023-09-29 PROCEDURE — 3079F DIAST BP 80-89 MM HG: CPT | Performed by: INTERNAL MEDICINE

## 2023-09-29 PROCEDURE — 3075F SYST BP GE 130 - 139MM HG: CPT | Performed by: INTERNAL MEDICINE

## 2023-09-29 RX ORDER — NITROGLYCERIN 0.4 MG/1
0.4 TABLET SUBLINGUAL EVERY 5 MIN PRN
Qty: 25 TABLET | Refills: 3 | Status: SHIPPED | OUTPATIENT
Start: 2023-09-29

## 2023-10-10 RX ORDER — SEMAGLUTIDE 2.68 MG/ML
INJECTION, SOLUTION SUBCUTANEOUS
Qty: 3 ML | Refills: 0 | Status: SHIPPED | OUTPATIENT
Start: 2023-10-10

## 2023-10-30 RX ORDER — CARVEDILOL 6.25 MG/1
TABLET ORAL
Qty: 180 TABLET | Refills: 0 | Status: SHIPPED | OUTPATIENT
Start: 2023-10-30

## 2023-11-03 ENCOUNTER — PATIENT MESSAGE (OUTPATIENT)
Dept: FAMILY MEDICINE CLINIC | Age: 63
End: 2023-11-03

## 2023-11-03 DIAGNOSIS — I25.119 ATHEROSCLEROSIS OF NATIVE CORONARY ARTERY OF NATIVE HEART WITH ANGINA PECTORIS (HCC): Primary | ICD-10-CM

## 2023-11-03 DIAGNOSIS — I10 PRIMARY HYPERTENSION: ICD-10-CM

## 2023-11-03 DIAGNOSIS — E78.2 MIXED HYPERLIPIDEMIA: Chronic | ICD-10-CM

## 2023-11-29 RX ORDER — SEMAGLUTIDE 2.68 MG/ML
INJECTION, SOLUTION SUBCUTANEOUS
Qty: 9 ML | Refills: 0 | Status: SHIPPED | OUTPATIENT
Start: 2023-11-29 | End: 2023-12-28

## 2023-12-04 ENCOUNTER — HOSPITAL ENCOUNTER (OUTPATIENT)
Dept: ULTRASOUND IMAGING | Age: 63
Discharge: HOME | End: 2023-12-04
Payer: COMMERCIAL

## 2023-12-04 DIAGNOSIS — E66.9 NON MORBID OBESITY, UNSPECIFIED OBESITY TYPE: Chronic | ICD-10-CM

## 2023-12-04 DIAGNOSIS — E04.2 MULTINODULAR GOITER: ICD-10-CM

## 2023-12-04 PROCEDURE — 76536 US EXAM OF HEAD AND NECK: CPT

## 2023-12-06 DIAGNOSIS — E53.8 B12 DEFICIENCY: ICD-10-CM

## 2023-12-06 DIAGNOSIS — E66.9 NON MORBID OBESITY, UNSPECIFIED OBESITY TYPE: Chronic | ICD-10-CM

## 2023-12-06 DIAGNOSIS — E04.2 MULTINODULAR GOITER: ICD-10-CM

## 2023-12-06 DIAGNOSIS — E11.00 TYPE 2 DIABETES MELLITUS WITH HYPEROSMOLARITY WITHOUT COMA, WITHOUT LONG-TERM CURRENT USE OF INSULIN (HCC): Primary | Chronic | ICD-10-CM

## 2023-12-06 LAB
ALBUMIN SERPL-MCNC: 4.4 G/DL (ref 3.4–5)
ALBUMIN/GLOB SERPL: 2 {RATIO} (ref 1.1–2.2)
ALP SERPL-CCNC: 66 U/L (ref 40–129)
ALT SERPL-CCNC: 25 U/L (ref 10–40)
ANION GAP SERPL CALCULATED.3IONS-SCNC: 11 MMOL/L (ref 3–16)
AST SERPL-CCNC: 23 U/L (ref 15–37)
BILIRUB SERPL-MCNC: 0.6 MG/DL (ref 0–1)
BUN SERPL-MCNC: 26 MG/DL (ref 7–20)
CALCIUM SERPL-MCNC: 9.4 MG/DL (ref 8.3–10.6)
CHLORIDE SERPL-SCNC: 101 MMOL/L (ref 99–110)
CO2 SERPL-SCNC: 28 MMOL/L (ref 21–32)
CREAT SERPL-MCNC: <0.5 MG/DL (ref 0.6–1.2)
FOLATE SERPL-MCNC: 12.18 NG/ML (ref 4.78–24.2)
GFR SERPLBLD CREATININE-BSD FMLA CKD-EPI: >60 ML/MIN/{1.73_M2}
GLUCOSE SERPL-MCNC: 86 MG/DL (ref 70–99)
POTASSIUM SERPL-SCNC: 4.1 MMOL/L (ref 3.5–5.1)
PROT SERPL-MCNC: 6.6 G/DL (ref 6.4–8.2)
SODIUM SERPL-SCNC: 140 MMOL/L (ref 136–145)
T4 FREE SERPL-MCNC: 1.2 NG/DL (ref 0.9–1.8)
TSH SERPL DL<=0.005 MIU/L-ACNC: 0.93 UIU/ML (ref 0.27–4.2)
VIT B12 SERPL-MCNC: 549 PG/ML (ref 211–911)

## 2023-12-07 RX ORDER — ROSUVASTATIN CALCIUM 40 MG/1
40 TABLET, COATED ORAL DAILY
COMMUNITY
Start: 2023-12-04

## 2023-12-11 ENCOUNTER — OFFICE VISIT (OUTPATIENT)
Dept: PULMONOLOGY | Age: 63
End: 2023-12-11
Payer: COMMERCIAL

## 2023-12-11 VITALS
DIASTOLIC BLOOD PRESSURE: 80 MMHG | WEIGHT: 160 LBS | SYSTOLIC BLOOD PRESSURE: 134 MMHG | BODY MASS INDEX: 27.46 KG/M2 | HEART RATE: 65 BPM | OXYGEN SATURATION: 96 %

## 2023-12-11 DIAGNOSIS — G47.33 OSA ON CPAP: Primary | Chronic | ICD-10-CM

## 2023-12-11 DIAGNOSIS — I25.10 CORONARY ARTERY DISEASE INVOLVING NATIVE CORONARY ARTERY OF NATIVE HEART WITHOUT ANGINA PECTORIS: Chronic | ICD-10-CM

## 2023-12-11 DIAGNOSIS — E11.59 TYPE 2 DIABETES MELLITUS WITH OTHER CIRCULATORY COMPLICATION, WITHOUT LONG-TERM CURRENT USE OF INSULIN (HCC): Chronic | ICD-10-CM

## 2023-12-11 DIAGNOSIS — I10 PRIMARY HYPERTENSION: Chronic | ICD-10-CM

## 2023-12-11 PROCEDURE — 3075F SYST BP GE 130 - 139MM HG: CPT | Performed by: INTERNAL MEDICINE

## 2023-12-11 PROCEDURE — 3044F HG A1C LEVEL LT 7.0%: CPT | Performed by: INTERNAL MEDICINE

## 2023-12-11 PROCEDURE — 3079F DIAST BP 80-89 MM HG: CPT | Performed by: INTERNAL MEDICINE

## 2023-12-11 PROCEDURE — 99214 OFFICE O/P EST MOD 30 MIN: CPT | Performed by: INTERNAL MEDICINE

## 2023-12-11 ASSESSMENT — SLEEP AND FATIGUE QUESTIONNAIRES
HOW LIKELY ARE YOU TO NOD OFF OR FALL ASLEEP WHILE SITTING QUIETLY AFTER LUNCH WITHOUT ALCOHOL: 1
HOW LIKELY ARE YOU TO NOD OFF OR FALL ASLEEP WHILE LYING DOWN TO REST IN THE AFTERNOON WHEN CIRCUMSTANCES PERMIT: 2
HOW LIKELY ARE YOU TO NOD OFF OR FALL ASLEEP WHILE SITTING AND TALKING TO SOMEONE: 0
HOW LIKELY ARE YOU TO NOD OFF OR FALL ASLEEP IN A CAR, WHILE STOPPED FOR A FEW MINUTES IN TRAFFIC: 0
HOW LIKELY ARE YOU TO NOD OFF OR FALL ASLEEP WHILE SITTING INACTIVE IN A PUBLIC PLACE: 0
HOW LIKELY ARE YOU TO NOD OFF OR FALL ASLEEP WHILE WATCHING TV: 3
ESS TOTAL SCORE: 10
HOW LIKELY ARE YOU TO NOD OFF OR FALL ASLEEP WHEN YOU ARE A PASSENGER IN A CAR FOR AN HOUR WITHOUT A BREAK: 1
HOW LIKELY ARE YOU TO NOD OFF OR FALL ASLEEP WHILE SITTING AND READING: 3

## 2023-12-11 NOTE — ASSESSMENT & PLAN NOTE
Chronic-Stable: Reviewed and analyzed results of physiologic download from patient's machine and reviewed with patient. Supplies and parts as needed for her machine. These are medically necessary. Limit caffeine use after 3pm. Based on the analyzed data will continue with current settings. Discussed working on her fullface mask fitting to help control her oral leak and dryness or she could try a chinstrap if she is not able to get a fullface mask to fit.   We discussed the leak and that is not affecting the efficacy of her machine aside from her dryness

## 2023-12-11 NOTE — PROGRESS NOTES
SaO2   BP Readings from Last 3 Encounters:   12/11/23 134/80   09/29/23 138/80   08/07/23 138/60    Pulse Readings from Last 3 Encounters:   12/11/23 65   09/29/23 58   08/07/23 63    SpO2 Readings from Last 3 Encounters:   12/11/23 96%   09/29/23 98%   08/07/23 95%        Electronically signed by Mireille Saenz MD on 12/11/2023 at 11:14 AM

## 2023-12-13 ENCOUNTER — OFFICE VISIT (OUTPATIENT)
Dept: ENDOCRINOLOGY | Age: 63
End: 2023-12-13
Payer: COMMERCIAL

## 2023-12-13 VITALS
BODY MASS INDEX: 27.14 KG/M2 | SYSTOLIC BLOOD PRESSURE: 135 MMHG | WEIGHT: 159 LBS | HEART RATE: 59 BPM | DIASTOLIC BLOOD PRESSURE: 67 MMHG | HEIGHT: 64 IN

## 2023-12-13 DIAGNOSIS — I25.10 CORONARY ARTERY DISEASE INVOLVING NATIVE CORONARY ARTERY OF NATIVE HEART WITHOUT ANGINA PECTORIS: Chronic | ICD-10-CM

## 2023-12-13 DIAGNOSIS — E78.2 MIXED HYPERLIPIDEMIA: Chronic | ICD-10-CM

## 2023-12-13 DIAGNOSIS — E05.90 HYPERTHYROIDISM: Primary | ICD-10-CM

## 2023-12-13 DIAGNOSIS — E04.2 MULTINODULAR GOITER: ICD-10-CM

## 2023-12-13 PROCEDURE — 3075F SYST BP GE 130 - 139MM HG: CPT | Performed by: INTERNAL MEDICINE

## 2023-12-13 PROCEDURE — 99214 OFFICE O/P EST MOD 30 MIN: CPT | Performed by: INTERNAL MEDICINE

## 2023-12-13 PROCEDURE — 3078F DIAST BP <80 MM HG: CPT | Performed by: INTERNAL MEDICINE

## 2023-12-13 RX ORDER — METHIMAZOLE 5 MG/1
2.5 TABLET ORAL EVERY MORNING
Qty: 30 TABLET | Refills: 3 | Status: SHIPPED | OUTPATIENT
Start: 2023-12-13

## 2023-12-13 NOTE — PROGRESS NOTES
nodule FNA done at [facility].  8/2020 no malignant cells identified   -- Repeat thyroid ultrasound in June 2023 shows slightly more solid nodule and a biopsy was recommended for the left lobe nodule which was previously biopsied we discussed the pros and cons of procedure and patient elected to proceed with repeat follow-up in 6 months. -- Repeat imaging in December 2023 shows stable thyroid lobe nodule and a follow-up was recommended in 2 years  Orders given to the patient to have a follow-up ultrasound in 9 months to a year. --Obesity with pre-diabetes . Discussed diet changes. Aic 5.9 >>5.7  She has lost another 6 lbs   ---Ozempic  2 mg weekly   She has lost weight since last visit   Highest weight was 214   All possible Side effects were discussed in detail with patient in detail and patient was advised to call our office if she  notes any side effects shewas given are written handout detailing side effects from the medication. ---Hyperlipidemia  On crestor 5 mg daily. Managed by PCP.     ---- HTN   Control stable     ---CAD s/p stent in Nov 2021   Follows with cardiology. Still on blood thinners   She has premature heart disease in her family. Return in about 6 months (around 6/13/2024).

## 2023-12-21 LAB — DIABETIC RETINOPATHY: NEGATIVE

## 2023-12-27 RX ORDER — LISINOPRIL 20 MG/1
TABLET ORAL
Qty: 180 TABLET | Refills: 3 | Status: SHIPPED | OUTPATIENT
Start: 2023-12-27

## 2023-12-27 NOTE — TELEPHONE ENCOUNTER
Medication:   Requested Prescriptions     Pending Prescriptions Disp Refills    lisinopril (PRINIVIL;ZESTRIL) 20 MG tablet [Pharmacy Med Name: Lisinopril 20 MG Oral Tablet] 180 tablet 0     Sig: Take 1 tablet by mouth twice daily       Last Filled:  2/2/2023    Patient Phone Number: [phone] (home) [phone] (work)    Last appt: 7/7/2023   Next appt: Visit date not found    Lab Results   Component Value Date     12/06/2023    K 4.1 12/06/2023     12/06/2023    CO2 28 12/06/2023    BUN 26 (H) 12/06/2023    CREATININE <0.5 (L) 12/06/2023    GLUCOSE 86 12/06/2023    CALCIUM 9.4 12/06/2023    PROT 6.6 12/06/2023    LABALBU 4.4 12/06/2023    BILITOT 0.6 12/06/2023    ALKPHOS 66 12/06/2023    AST 23 12/06/2023    ALT 25 12/06/2023    LABGLOM >60 12/06/2023    GFRAA >60 10/14/2022    AGRATIO 2.0 12/06/2023    GLOB 2.5 07/02/2021

## 2023-12-28 RX ORDER — SEMAGLUTIDE 2.68 MG/ML
INJECTION, SOLUTION SUBCUTANEOUS
Qty: 3 ML | Refills: 2 | Status: SHIPPED | OUTPATIENT
Start: 2023-12-28

## 2024-01-29 RX ORDER — CARVEDILOL 6.25 MG/1
6.25 TABLET ORAL 2 TIMES DAILY
Qty: 180 TABLET | Refills: 3 | Status: SHIPPED | OUTPATIENT
Start: 2024-01-29

## 2024-01-29 RX ORDER — CARVEDILOL 6.25 MG/1
6.25 TABLET ORAL 2 TIMES DAILY
Qty: 180 TABLET | Refills: 0 | Status: SHIPPED | OUTPATIENT
Start: 2024-01-29 | End: 2024-01-29 | Stop reason: SDUPTHER

## 2024-05-20 RX ORDER — SEMAGLUTIDE 2.68 MG/ML
INJECTION, SOLUTION SUBCUTANEOUS
Qty: 3 ML | Refills: 2 | Status: SHIPPED | OUTPATIENT
Start: 2024-05-20

## 2024-05-20 NOTE — TELEPHONE ENCOUNTER
Requested Prescriptions     Signed Prescriptions Disp Refills    semaglutide, 2 MG/DOSE, (OZEMPIC, 2 MG/DOSE,) 8 MG/3ML SOPN sc injection 3 mL 2     Sig: Inject 2 mg weekly.     Authorizing Provider: ANURADHA PETERS     Ordering User: DONNIE MAN       [facility] [address] [place] [address] - [address] [facility] - P [phone] - F [phone] [address] [facility] [address]  Phone: [phone] Fax: [phone]

## 2024-05-30 ASSESSMENT — PATIENT HEALTH QUESTIONNAIRE - PHQ9
SUM OF ALL RESPONSES TO PHQ QUESTIONS 1-9: 0
2. FEELING DOWN, DEPRESSED OR HOPELESS: NOT AT ALL
SUM OF ALL RESPONSES TO PHQ9 QUESTIONS 1 & 2: 0
SUM OF ALL RESPONSES TO PHQ QUESTIONS 1-9: 0
1. LITTLE INTEREST OR PLEASURE IN DOING THINGS: NOT AT ALL
SUM OF ALL RESPONSES TO PHQ QUESTIONS 1-9: 0
2. FEELING DOWN, DEPRESSED OR HOPELESS: NOT AT ALL
SUM OF ALL RESPONSES TO PHQ9 QUESTIONS 1 & 2: 0
SUM OF ALL RESPONSES TO PHQ QUESTIONS 1-9: 0
1. LITTLE INTEREST OR PLEASURE IN DOING THINGS: NOT AT ALL

## 2024-05-31 ENCOUNTER — OFFICE VISIT (OUTPATIENT)
Dept: FAMILY MEDICINE CLINIC | Age: 64
End: 2024-05-31
Payer: COMMERCIAL

## 2024-05-31 VITALS
OXYGEN SATURATION: 96 % | HEART RATE: 56 BPM | SYSTOLIC BLOOD PRESSURE: 130 MMHG | BODY MASS INDEX: 27.12 KG/M2 | WEIGHT: 158 LBS | DIASTOLIC BLOOD PRESSURE: 73 MMHG

## 2024-05-31 DIAGNOSIS — I27.20 PULMONARY HYPERTENSION (HCC): ICD-10-CM

## 2024-05-31 DIAGNOSIS — I25.119 ATHEROSCLEROSIS OF NATIVE CORONARY ARTERY OF NATIVE HEART WITH ANGINA PECTORIS (HCC): ICD-10-CM

## 2024-05-31 DIAGNOSIS — Z00.00 PHYSICAL EXAM, ROUTINE: Primary | ICD-10-CM

## 2024-05-31 DIAGNOSIS — E11.00 TYPE 2 DIABETES MELLITUS WITH HYPEROSMOLARITY WITHOUT COMA, WITHOUT LONG-TERM CURRENT USE OF INSULIN (HCC): Chronic | ICD-10-CM

## 2024-05-31 DIAGNOSIS — Z87.891 EX-SMOKER: ICD-10-CM

## 2024-05-31 DIAGNOSIS — J44.9 OBSTRUCTIVE AIRWAY DISEASE (HCC): ICD-10-CM

## 2024-05-31 LAB
25(OH)D3 SERPL-MCNC: 62 NG/ML
ALBUMIN SERPL-MCNC: 4.3 G/DL (ref 3.4–5)
ALBUMIN/GLOB SERPL: 1.9 {RATIO} (ref 1.1–2.2)
ALP SERPL-CCNC: 60 U/L (ref 40–129)
ALT SERPL-CCNC: 20 U/L (ref 10–40)
ANION GAP SERPL CALCULATED.3IONS-SCNC: 8 MMOL/L (ref 3–16)
AST SERPL-CCNC: 16 U/L (ref 15–37)
BASOPHILS # BLD: 0 K/UL (ref 0–0.2)
BASOPHILS NFR BLD: 0.6 %
BILIRUB SERPL-MCNC: 0.4 MG/DL (ref 0–1)
BUN SERPL-MCNC: 27 MG/DL (ref 7–20)
CALCIUM SERPL-MCNC: 10 MG/DL (ref 8.3–10.6)
CHLORIDE SERPL-SCNC: 106 MMOL/L (ref 99–110)
CO2 SERPL-SCNC: 28 MMOL/L (ref 21–32)
CREAT SERPL-MCNC: <0.5 MG/DL (ref 0.6–1.2)
DEPRECATED RDW RBC AUTO: 13.3 % (ref 12.4–15.4)
EOSINOPHIL # BLD: 0.2 K/UL (ref 0–0.6)
EOSINOPHIL NFR BLD: 2.9 %
FOLATE SERPL-MCNC: 10.82 NG/ML (ref 4.78–24.2)
GFR SERPLBLD CREATININE-BSD FMLA CKD-EPI: >90 ML/MIN/{1.73_M2}
GLUCOSE SERPL-MCNC: 99 MG/DL (ref 70–99)
HCT VFR BLD AUTO: 38.2 % (ref 36–48)
HGB BLD-MCNC: 13 G/DL (ref 12–16)
LYMPHOCYTES # BLD: 1.7 K/UL (ref 1–5.1)
LYMPHOCYTES NFR BLD: 30.5 %
MCH RBC QN AUTO: 29 PG (ref 26–34)
MCHC RBC AUTO-ENTMCNC: 34.1 G/DL (ref 31–36)
MCV RBC AUTO: 85.1 FL (ref 80–100)
MONOCYTES # BLD: 0.5 K/UL (ref 0–1.3)
MONOCYTES NFR BLD: 8.1 %
NEUTROPHILS # BLD: 3.3 K/UL (ref 1.7–7.7)
NEUTROPHILS NFR BLD: 57.9 %
PLATELET # BLD AUTO: 244 K/UL (ref 135–450)
PMV BLD AUTO: 8.7 FL (ref 5–10.5)
POTASSIUM SERPL-SCNC: 4.6 MMOL/L (ref 3.5–5.1)
PROT SERPL-MCNC: 6.6 G/DL (ref 6.4–8.2)
RBC # BLD AUTO: 4.49 M/UL (ref 4–5.2)
SODIUM SERPL-SCNC: 142 MMOL/L (ref 136–145)
TSH SERPL DL<=0.005 MIU/L-ACNC: 0.3 UIU/ML (ref 0.27–4.2)
VIT B12 SERPL-MCNC: 563 PG/ML (ref 211–911)
WBC # BLD AUTO: 5.7 K/UL (ref 4–11)

## 2024-05-31 PROCEDURE — 3075F SYST BP GE 130 - 139MM HG: CPT | Performed by: FAMILY MEDICINE

## 2024-05-31 PROCEDURE — 90715 TDAP VACCINE 7 YRS/> IM: CPT | Performed by: FAMILY MEDICINE

## 2024-05-31 PROCEDURE — 99396 PREV VISIT EST AGE 40-64: CPT | Performed by: FAMILY MEDICINE

## 2024-05-31 PROCEDURE — 3078F DIAST BP <80 MM HG: CPT | Performed by: FAMILY MEDICINE

## 2024-05-31 PROCEDURE — 90471 IMMUNIZATION ADMIN: CPT | Performed by: FAMILY MEDICINE

## 2024-05-31 SDOH — ECONOMIC STABILITY: INCOME INSECURITY: HOW HARD IS IT FOR YOU TO PAY FOR THE VERY BASICS LIKE FOOD, HOUSING, MEDICAL CARE, AND HEATING?: NOT HARD AT ALL

## 2024-05-31 SDOH — ECONOMIC STABILITY: FOOD INSECURITY: WITHIN THE PAST 12 MONTHS, THE FOOD YOU BOUGHT JUST DIDN'T LAST AND YOU DIDN'T HAVE MONEY TO GET MORE.: NEVER TRUE

## 2024-05-31 SDOH — ECONOMIC STABILITY: FOOD INSECURITY: WITHIN THE PAST 12 MONTHS, YOU WORRIED THAT YOUR FOOD WOULD RUN OUT BEFORE YOU GOT MONEY TO BUY MORE.: NEVER TRUE

## 2024-05-31 NOTE — PROGRESS NOTES
Chief Complaint:   Melinda De Los Santos is a 63 y.o. female who presents for complete physical examination    History of Present Illness:    Meds, vitamins and allergies reviewed with pt    Last labs reviewed with patient  Due for extra labs this morning    Offer Tdap today, in agreement    Discussed shingles and RSV vaccines and timing of    Requesting low-dose chest CT greater than 40-pack-year history of smoking, quit 2010      Ex smoker   2ppd x 20 yrs and smoke exposure, quit 2010    Sees cardiology/ [facility] :  [facility]    Wt Readings from Last 3 Encounters:   05/31/24 71.7 kg (158 lb)   03/07/24 74.1 kg (163 lb 6.4 oz)   12/13/23 72.1 kg (159 lb)       Patient Active Problem List   Diagnosis    Hyperlipidemia    Primary hypertension    Breast lump or mass    Family history of lung cancer    Family history of essential hypertension    Multinodular goiter    Coronary artery disease involving native coronary artery of native heart without angina pectoris    Pulmonary hypertension (HCC)    ARLENE on CPAP    Olecranon bursitis of right elbow    COVID-19 virus infection    Obstructive airway disease (HCC)    Vertigo    Type 2 diabetes mellitus    Angina pectoris, unspecified    Atherosclerotic heart disease of native coronary artery with unspecified angina pectoris    Occlusion and stenosis of left posterior cerebral artery    Stenosis of left vertebral artery    B12 deficiency     Past Medical History:   Diagnosis Date    Arthritis     R KNEE-EVENTUALLY NEEDS RTKR    Breast lump or mass     CAD (coronary artery disease)     Gallstones     Hyperlipidemia     Hypertension     Sleep apnea     Thyroid disease     MULTINODULAR-6/2020    Type 2 diabetes mellitus 05/10/2023       Past Surgical History:   Procedure Laterality Date    BREAST SURGERY  1985,1990,1998    CHOLECYSTECTOMY, LAPAROSCOPIC  02/03/2017    COLONOSCOPY  08/01/2010    CYSTOSCOPY  07/19/2007    HYSTERECTOMY (CERVIX STATUS UNKNOWN)  2007    PTCA  11/21

## 2024-06-01 LAB
CREAT UR-MCNC: 183.7 MG/DL (ref 28–259)
EST. AVERAGE GLUCOSE BLD GHB EST-MCNC: 111.2 MG/DL
HBA1C MFR BLD: 5.5 %
MICROALBUMIN UR DL<=1MG/L-MCNC: <1.2 MG/DL
MICROALBUMIN/CREAT UR: NORMAL MG/G (ref 0–30)

## 2024-07-18 ENCOUNTER — HOSPITAL ENCOUNTER (OUTPATIENT)
Dept: CT IMAGING | Age: 64
Discharge: HOME | End: 2024-07-18
Attending: FAMILY MEDICINE
Payer: COMMERCIAL

## 2024-07-18 DIAGNOSIS — Z87.891 EX-SMOKER: ICD-10-CM

## 2024-07-18 PROCEDURE — 71271 CT THORAX LUNG CANCER SCR C-: CPT

## 2024-08-04 DIAGNOSIS — E05.90 HYPERTHYROIDISM: ICD-10-CM

## 2024-08-05 RX ORDER — METHIMAZOLE 5 MG/1
TABLET ORAL
Qty: 30 TABLET | Refills: 1 | Status: SHIPPED | OUTPATIENT
Start: 2024-08-05

## 2024-08-13 DIAGNOSIS — E66.9 OBESITY, UNSPECIFIED CLASSIFICATION, UNSPECIFIED OBESITY TYPE, UNSPECIFIED WHETHER SERIOUS COMORBIDITY PRESENT: Primary | ICD-10-CM

## 2024-08-13 RX ORDER — SEMAGLUTIDE 2.68 MG/ML
INJECTION, SOLUTION SUBCUTANEOUS
Qty: 9 ML | Refills: 1 | Status: SHIPPED | OUTPATIENT
Start: 2024-08-13

## 2024-08-16 ENCOUNTER — HOSPITAL ENCOUNTER (OUTPATIENT)
Dept: ULTRASOUND IMAGING | Age: 64
Discharge: HOME | End: 2024-08-16
Payer: COMMERCIAL

## 2024-08-16 DIAGNOSIS — E05.90 HYPERTHYROIDISM: ICD-10-CM

## 2024-08-16 DIAGNOSIS — E04.2 MULTINODULAR GOITER: ICD-10-CM

## 2024-08-16 PROCEDURE — 76536 US EXAM OF HEAD AND NECK: CPT

## 2024-09-13 DIAGNOSIS — E78.2 MIXED HYPERLIPIDEMIA: Chronic | ICD-10-CM

## 2024-09-13 DIAGNOSIS — E05.90 HYPERTHYROIDISM: ICD-10-CM

## 2024-09-13 DIAGNOSIS — E04.2 MULTINODULAR GOITER: ICD-10-CM

## 2024-09-13 DIAGNOSIS — I25.10 CORONARY ARTERY DISEASE INVOLVING NATIVE CORONARY ARTERY OF NATIVE HEART WITHOUT ANGINA PECTORIS: Chronic | ICD-10-CM

## 2024-09-13 LAB
ALBUMIN SERPL-MCNC: 4.2 G/DL (ref 3.4–5)
ALBUMIN/GLOB SERPL: 2 {RATIO} (ref 1.1–2.2)
ALP SERPL-CCNC: 55 U/L (ref 40–129)
ALT SERPL-CCNC: 24 U/L (ref 10–40)
ANION GAP SERPL CALCULATED.3IONS-SCNC: 11 MMOL/L (ref 3–16)
AST SERPL-CCNC: 25 U/L (ref 15–37)
BILIRUB SERPL-MCNC: 0.6 MG/DL (ref 0–1)
BUN SERPL-MCNC: 20 MG/DL (ref 7–20)
CALCIUM SERPL-MCNC: 9.3 MG/DL (ref 8.3–10.6)
CHLORIDE SERPL-SCNC: 106 MMOL/L (ref 99–110)
CHOLEST SERPL-MCNC: 121 MG/DL (ref 0–199)
CO2 SERPL-SCNC: 27 MMOL/L (ref 21–32)
CREAT SERPL-MCNC: <0.5 MG/DL (ref 0.6–1.2)
GFR SERPLBLD CREATININE-BSD FMLA CKD-EPI: >90 ML/MIN/{1.73_M2}
GLUCOSE SERPL-MCNC: 87 MG/DL (ref 70–99)
HDLC SERPL-MCNC: 59 MG/DL (ref 40–60)
LDLC SERPL CALC-MCNC: 52 MG/DL
POTASSIUM SERPL-SCNC: 3.8 MMOL/L (ref 3.5–5.1)
PROT SERPL-MCNC: 6.3 G/DL (ref 6.4–8.2)
SODIUM SERPL-SCNC: 144 MMOL/L (ref 136–145)
T4 FREE SERPL-MCNC: 1.2 NG/DL (ref 0.9–1.8)
TRIGL SERPL-MCNC: 48 MG/DL (ref 0–150)
TSH SERPL DL<=0.005 MIU/L-ACNC: 0.29 UIU/ML (ref 0.27–4.2)
VLDLC SERPL CALC-MCNC: 10 MG/DL

## 2024-09-14 LAB
EST. AVERAGE GLUCOSE BLD GHB EST-MCNC: 102.5 MG/DL
HBA1C MFR BLD: 5.2 %

## 2024-09-19 ENCOUNTER — OFFICE VISIT (OUTPATIENT)
Dept: ENDOCRINOLOGY | Age: 64
End: 2024-09-19
Payer: MEDICARE

## 2024-09-19 VITALS
SYSTOLIC BLOOD PRESSURE: 134 MMHG | TEMPERATURE: 98 F | DIASTOLIC BLOOD PRESSURE: 87 MMHG | HEART RATE: 60 BPM | WEIGHT: 156 LBS | BODY MASS INDEX: 26.63 KG/M2 | HEIGHT: 64 IN | RESPIRATION RATE: 14 BRPM

## 2024-09-19 DIAGNOSIS — E04.2 MULTINODULAR GOITER: ICD-10-CM

## 2024-09-19 DIAGNOSIS — E05.90 HYPERTHYROIDISM: Primary | ICD-10-CM

## 2024-09-19 DIAGNOSIS — E66.9 OBESITY, UNSPECIFIED CLASSIFICATION, UNSPECIFIED OBESITY TYPE, UNSPECIFIED WHETHER SERIOUS COMORBIDITY PRESENT: ICD-10-CM

## 2024-09-19 PROCEDURE — G8427 DOCREV CUR MEDS BY ELIG CLIN: HCPCS | Performed by: INTERNAL MEDICINE

## 2024-09-19 PROCEDURE — 3075F SYST BP GE 130 - 139MM HG: CPT | Performed by: INTERNAL MEDICINE

## 2024-09-19 PROCEDURE — 3017F COLORECTAL CA SCREEN DOC REV: CPT | Performed by: INTERNAL MEDICINE

## 2024-09-19 PROCEDURE — 99214 OFFICE O/P EST MOD 30 MIN: CPT | Performed by: INTERNAL MEDICINE

## 2024-09-19 PROCEDURE — G8419 CALC BMI OUT NRM PARAM NOF/U: HCPCS | Performed by: INTERNAL MEDICINE

## 2024-09-19 PROCEDURE — 1123F ACP DISCUSS/DSCN MKR DOCD: CPT | Performed by: INTERNAL MEDICINE

## 2024-09-19 PROCEDURE — 1090F PRES/ABSN URINE INCON ASSESS: CPT | Performed by: INTERNAL MEDICINE

## 2024-09-19 PROCEDURE — G8399 PT W/DXA RESULTS DOCUMENT: HCPCS | Performed by: INTERNAL MEDICINE

## 2024-09-19 PROCEDURE — G2211 COMPLEX E/M VISIT ADD ON: HCPCS | Performed by: INTERNAL MEDICINE

## 2024-09-19 PROCEDURE — 3079F DIAST BP 80-89 MM HG: CPT | Performed by: INTERNAL MEDICINE

## 2024-09-19 PROCEDURE — 1036F TOBACCO NON-USER: CPT | Performed by: INTERNAL MEDICINE

## 2024-09-19 RX ORDER — METHIMAZOLE 5 MG/1
TABLET ORAL
Qty: 45 TABLET | Refills: 1 | Status: SHIPPED | OUTPATIENT
Start: 2024-09-19

## 2024-11-18 LAB — MAMMOGRAPHY, EXTERNAL: NORMAL

## 2025-01-14 RX ORDER — LISINOPRIL 20 MG/1
TABLET ORAL
Qty: 180 TABLET | Refills: 1 | Status: SHIPPED | OUTPATIENT
Start: 2025-01-14

## 2025-01-14 NOTE — TELEPHONE ENCOUNTER
Medication:   Requested Prescriptions     Pending Prescriptions Disp Refills    lisinopril (PRINIVIL;ZESTRIL) 20 MG tablet [Pharmacy Med Name: Lisinopril 20 MG Oral Tablet] 180 tablet 0     Sig: Take 1 tablet by mouth twice daily       Last Filled:  12/27/2023    Patient Phone Number: [phone] (home) [phone] (work)    Last appt: 5/31/2024   Next appt: Visit date not found    Lab Results   Component Value Date     09/13/2024    K 3.8 09/13/2024     09/13/2024    CO2 27 09/13/2024    BUN 20 09/13/2024    CREATININE <0.5 (L) 09/13/2024    GLUCOSE 87 09/13/2024    CALCIUM 9.3 09/13/2024    BILITOT 0.6 09/13/2024    ALKPHOS 55 09/13/2024    AST 25 09/13/2024    ALT 24 09/13/2024    LABGLOM >90 09/13/2024    GFRAA >60 10/14/2022    AGRATIO 2.0 09/13/2024    GLOB 2.5 07/02/2021

## 2025-02-28 ENCOUNTER — TELEPHONE (OUTPATIENT)
Dept: FAMILY MEDICINE CLINIC | Age: 65
End: 2025-02-28

## 2025-02-28 NOTE — TELEPHONE ENCOUNTER
Patient needs labs  for upcoming appointment     Yearly physical; I need to get annual lab work to include T3 and T4 and B12

## 2025-04-14 RX ORDER — CARVEDILOL 6.25 MG/1
6.25 TABLET ORAL 2 TIMES DAILY
Qty: 180 TABLET | Refills: 0 | Status: SHIPPED | OUTPATIENT
Start: 2025-04-14

## 2025-04-14 NOTE — TELEPHONE ENCOUNTER
Medication:   Requested Prescriptions     Pending Prescriptions Disp Refills    carvedilol (COREG) 6.25 MG tablet [Pharmacy Med Name: Carvedilol 6.25 MG Oral Tablet] 180 tablet 0     Sig: Take 1 tablet by mouth twice daily        Last Filled:      Patient Phone Number: [phone] (home) [phone] (work)    Last appt: 5/31/2024   Next appt: 5/28/2025    Last OARRS:        No data to display

## 2025-06-06 DIAGNOSIS — E11.9 TYPE 2 DIABETES MELLITUS WITHOUT COMPLICATION, WITHOUT LONG-TERM CURRENT USE OF INSULIN (HCC): ICD-10-CM

## 2025-06-06 DIAGNOSIS — I10 PRIMARY HYPERTENSION: Chronic | ICD-10-CM

## 2025-06-06 DIAGNOSIS — E78.2 MIXED HYPERLIPIDEMIA: Chronic | ICD-10-CM

## 2025-06-06 DIAGNOSIS — E05.90 HYPERTHYROIDISM: ICD-10-CM

## 2025-06-06 DIAGNOSIS — E55.9 VITAMIN D DEFICIENCY: ICD-10-CM

## 2025-06-06 DIAGNOSIS — E53.8 B12 DEFICIENCY: ICD-10-CM

## 2025-06-06 DIAGNOSIS — E66.9 OBESITY: ICD-10-CM

## 2025-06-06 DIAGNOSIS — E04.2 MULTINODULAR GOITER: ICD-10-CM

## 2025-06-06 DIAGNOSIS — I10 PRIMARY HYPERTENSION: Primary | Chronic | ICD-10-CM

## 2025-06-06 LAB
25(OH)D3 SERPL-MCNC: 26.5 NG/ML
ALBUMIN SERPL-MCNC: 4.3 G/DL (ref 3.4–5)
ALBUMIN/GLOB SERPL: 2 {RATIO} (ref 1.1–2.2)
ALP SERPL-CCNC: 59 U/L (ref 40–129)
ALT SERPL-CCNC: 18 U/L (ref 10–40)
ANION GAP SERPL CALCULATED.3IONS-SCNC: 9 MMOL/L (ref 3–16)
AST SERPL-CCNC: 20 U/L (ref 15–37)
BASOPHILS # BLD: 0 K/UL (ref 0–0.2)
BASOPHILS NFR BLD: 0.6 %
BILIRUB SERPL-MCNC: 0.5 MG/DL (ref 0–1)
BUN SERPL-MCNC: 22 MG/DL (ref 7–20)
CALCIUM SERPL-MCNC: 9.4 MG/DL (ref 8.3–10.6)
CHLORIDE SERPL-SCNC: 105 MMOL/L (ref 99–110)
CHOLEST SERPL-MCNC: 143 MG/DL (ref 0–199)
CO2 SERPL-SCNC: 28 MMOL/L (ref 21–32)
CREAT SERPL-MCNC: 0.5 MG/DL (ref 0.6–1.2)
DEPRECATED RDW RBC AUTO: 13.8 % (ref 12.4–15.4)
EOSINOPHIL # BLD: 0.2 K/UL (ref 0–0.6)
EOSINOPHIL NFR BLD: 4.4 %
FOLATE SERPL-MCNC: 16.5 NG/ML (ref 4.78–24.2)
GFR SERPLBLD CREATININE-BSD FMLA CKD-EPI: >90 ML/MIN/{1.73_M2}
GLUCOSE P FAST SERPL-MCNC: 100 MG/DL (ref 70–99)
HCT VFR BLD AUTO: 40.4 % (ref 36–48)
HDLC SERPL-MCNC: 71 MG/DL (ref 40–60)
HGB BLD-MCNC: 13.1 G/DL (ref 12–16)
LDL CHOLESTEROL: 62 MG/DL
LYMPHOCYTES # BLD: 1.8 K/UL (ref 1–5.1)
LYMPHOCYTES NFR BLD: 37 %
MCH RBC QN AUTO: 28.2 PG (ref 26–34)
MCHC RBC AUTO-ENTMCNC: 32.6 G/DL (ref 31–36)
MCV RBC AUTO: 86.6 FL (ref 80–100)
MONOCYTES # BLD: 0.3 K/UL (ref 0–1.3)
MONOCYTES NFR BLD: 6.7 %
NEUTROPHILS # BLD: 2.5 K/UL (ref 1.7–7.7)
NEUTROPHILS NFR BLD: 51.3 %
PLATELET # BLD AUTO: 232 K/UL (ref 135–450)
PMV BLD AUTO: 8.8 FL (ref 5–10.5)
POTASSIUM SERPL-SCNC: 4.6 MMOL/L (ref 3.5–5.1)
PROT SERPL-MCNC: 6.5 G/DL (ref 6.4–8.2)
RBC # BLD AUTO: 4.66 M/UL (ref 4–5.2)
SODIUM SERPL-SCNC: 142 MMOL/L (ref 136–145)
T3 SERPL-MCNC: 1.5 NG/ML (ref 0.8–2)
T4 FREE SERPL-MCNC: 1 NG/DL (ref 0.9–1.8)
TRIGL SERPL-MCNC: 50 MG/DL (ref 0–150)
TSH SERPL DL<=0.005 MIU/L-ACNC: 1.42 UIU/ML (ref 0.27–4.2)
VIT B12 SERPL-MCNC: 335 PG/ML (ref 211–911)
VLDLC SERPL CALC-MCNC: 10 MG/DL
WBC # BLD AUTO: 4.9 K/UL (ref 4–11)

## 2025-06-07 ENCOUNTER — RESULTS FOLLOW-UP (OUTPATIENT)
Dept: FAMILY MEDICINE CLINIC | Age: 65
End: 2025-06-07

## 2025-06-07 LAB
EST. AVERAGE GLUCOSE BLD GHB EST-MCNC: 122.6 MG/DL
HBA1C MFR BLD: 5.9 %

## 2025-06-10 PROBLEM — M70.21 OLECRANON BURSITIS OF RIGHT ELBOW: Status: RESOLVED | Noted: 2022-04-20 | Resolved: 2025-06-10

## 2025-06-10 LAB — MAMMOGRAPHY, EXTERNAL: NORMAL

## 2025-06-11 ENCOUNTER — OFFICE VISIT (OUTPATIENT)
Dept: FAMILY MEDICINE CLINIC | Age: 65
End: 2025-06-11

## 2025-06-11 VITALS
HEIGHT: 64 IN | HEART RATE: 58 BPM | WEIGHT: 178 LBS | BODY MASS INDEX: 30.39 KG/M2 | DIASTOLIC BLOOD PRESSURE: 74 MMHG | OXYGEN SATURATION: 97 % | SYSTOLIC BLOOD PRESSURE: 186 MMHG

## 2025-06-11 DIAGNOSIS — E78.2 MIXED HYPERLIPIDEMIA: Chronic | ICD-10-CM

## 2025-06-11 DIAGNOSIS — E55.9 VITAMIN D DEFICIENCY: ICD-10-CM

## 2025-06-11 DIAGNOSIS — I10 PRIMARY HYPERTENSION: Chronic | ICD-10-CM

## 2025-06-11 DIAGNOSIS — Z00.00 WELCOME TO MEDICARE PREVENTIVE VISIT: Primary | ICD-10-CM

## 2025-06-11 DIAGNOSIS — I25.10 CORONARY ARTERY DISEASE INVOLVING NATIVE CORONARY ARTERY OF NATIVE HEART WITHOUT ANGINA PECTORIS: Chronic | ICD-10-CM

## 2025-06-11 DIAGNOSIS — R73.03 PREDIABETES: ICD-10-CM

## 2025-06-11 DIAGNOSIS — G47.33 OSA ON CPAP: Chronic | ICD-10-CM

## 2025-06-11 DIAGNOSIS — J44.9 OBSTRUCTIVE AIRWAY DISEASE (HCC): ICD-10-CM

## 2025-06-11 DIAGNOSIS — I27.20 PULMONARY HYPERTENSION (HCC): ICD-10-CM

## 2025-06-11 PROCEDURE — G0402 INITIAL PREVENTIVE EXAM: HCPCS | Performed by: FAMILY MEDICINE

## 2025-06-11 PROCEDURE — 1123F ACP DISCUSS/DSCN MKR DOCD: CPT | Performed by: FAMILY MEDICINE

## 2025-06-11 PROCEDURE — 3077F SYST BP >= 140 MM HG: CPT | Performed by: FAMILY MEDICINE

## 2025-06-11 PROCEDURE — 3078F DIAST BP <80 MM HG: CPT | Performed by: FAMILY MEDICINE

## 2025-06-11 SDOH — ECONOMIC STABILITY: FOOD INSECURITY: WITHIN THE PAST 12 MONTHS, THE FOOD YOU BOUGHT JUST DIDN'T LAST AND YOU DIDN'T HAVE MONEY TO GET MORE.: NEVER TRUE

## 2025-06-11 SDOH — ECONOMIC STABILITY: FOOD INSECURITY: WITHIN THE PAST 12 MONTHS, YOU WORRIED THAT YOUR FOOD WOULD RUN OUT BEFORE YOU GOT MONEY TO BUY MORE.: NEVER TRUE

## 2025-06-11 ASSESSMENT — PATIENT HEALTH QUESTIONNAIRE - PHQ9
SUM OF ALL RESPONSES TO PHQ QUESTIONS 1-9: 0
2. FEELING DOWN, DEPRESSED OR HOPELESS: NOT AT ALL
SUM OF ALL RESPONSES TO PHQ QUESTIONS 1-9: 0
2. FEELING DOWN, DEPRESSED OR HOPELESS: NOT AT ALL
1. LITTLE INTEREST OR PLEASURE IN DOING THINGS: NOT AT ALL
1. LITTLE INTEREST OR PLEASURE IN DOING THINGS: NOT AT ALL

## 2025-06-11 ASSESSMENT — LIFESTYLE VARIABLES: HOW OFTEN DO YOU HAVE A DRINK CONTAINING ALCOHOL: NEVER

## 2025-06-11 NOTE — PROGRESS NOTES
Medicare Annual Wellness Visit    Melinda De Los Santos is here for Medicare AWV    Assessment & Plan   Welcome to Medicare preventive visit  Healthy diet, stay active  Patient is hesitant about vaccines, discussed  Recommend to at least get a flu vaccine in the fall  About RSV and shingles... Fearful of reaction... Recommend she talk to pharmacist    Primary hypertension  Elevated systolic blood pressure, history of whitecoat, bring blood pressure cuff in for comparison  Dates her blood pressure runs well at home    Pulmonary hypertension (HCC)  Follows with cardiology, sees them in August    Obstructive airway disease (HCC)  Stable on no inhalers    Mixed hyperlipidemia  Stable on Crestor 20 mg nightly, continue    Coronary artery disease involving native coronary artery of native heart without angina pectoris  Continue Crestor 20 mg nightly and baby aspirin nightly  Follow-up with cardiology    Vitamin D deficiency  Vitamin D3 2000 units daily    Prediabetes  Healthy diet and exercise, patient is not diabetic, she is prediabetic    ARLENE on CPAP  Continue BiPAP at night       No follow-ups on file.     Subjective   The following acute and/or chronic problems were also addressed today:  White coat HTN, will have her bring her home cuff in for comparison  Labs 6/2025 reviewed with patient in detail    Patient's complete Health Risk Assessment and screening values have been reviewed and are found in Flowsheets. The following problems were reviewed today and where indicated follow up appointments were made and/or referrals ordered.    Positive Risk Factor Screenings with Interventions:             General HRA Questions:  Select all that apply: (!) New or Increased Fatigue  Interventions Fatigue:  Healthy diet and exercise        Abnormal BMI (obese):  Body mass index is 30.53 kg/m². (!) Abnormal  Interventions:  Healthy diet and exercise, will discuss GLP-1 again with her Endo specialist          Vision Screen:  Do you have

## 2025-06-19 ENCOUNTER — OFFICE VISIT (OUTPATIENT)
Dept: ENDOCRINOLOGY | Age: 65
End: 2025-06-19
Payer: MEDICARE

## 2025-06-19 VITALS
RESPIRATION RATE: 14 BRPM | WEIGHT: 180 LBS | TEMPERATURE: 98 F | SYSTOLIC BLOOD PRESSURE: 158 MMHG | BODY MASS INDEX: 30.73 KG/M2 | HEART RATE: 51 BPM | DIASTOLIC BLOOD PRESSURE: 81 MMHG | HEIGHT: 64 IN

## 2025-06-19 DIAGNOSIS — E05.90 HYPERTHYROIDISM: Primary | ICD-10-CM

## 2025-06-19 DIAGNOSIS — E04.2 MULTINODULAR GOITER: ICD-10-CM

## 2025-06-19 DIAGNOSIS — I25.10 CORONARY ARTERY DISEASE INVOLVING NATIVE CORONARY ARTERY OF NATIVE HEART WITHOUT ANGINA PECTORIS: ICD-10-CM

## 2025-06-19 PROCEDURE — 3079F DIAST BP 80-89 MM HG: CPT | Performed by: INTERNAL MEDICINE

## 2025-06-19 PROCEDURE — 1036F TOBACCO NON-USER: CPT | Performed by: INTERNAL MEDICINE

## 2025-06-19 PROCEDURE — G8427 DOCREV CUR MEDS BY ELIG CLIN: HCPCS | Performed by: INTERNAL MEDICINE

## 2025-06-19 PROCEDURE — 1090F PRES/ABSN URINE INCON ASSESS: CPT | Performed by: INTERNAL MEDICINE

## 2025-06-19 PROCEDURE — G8417 CALC BMI ABV UP PARAM F/U: HCPCS | Performed by: INTERNAL MEDICINE

## 2025-06-19 PROCEDURE — 1123F ACP DISCUSS/DSCN MKR DOCD: CPT | Performed by: INTERNAL MEDICINE

## 2025-06-19 PROCEDURE — 99214 OFFICE O/P EST MOD 30 MIN: CPT | Performed by: INTERNAL MEDICINE

## 2025-06-19 PROCEDURE — G2211 COMPLEX E/M VISIT ADD ON: HCPCS | Performed by: INTERNAL MEDICINE

## 2025-06-19 PROCEDURE — 3017F COLORECTAL CA SCREEN DOC REV: CPT | Performed by: INTERNAL MEDICINE

## 2025-06-19 PROCEDURE — 3077F SYST BP >= 140 MM HG: CPT | Performed by: INTERNAL MEDICINE

## 2025-06-19 PROCEDURE — G8399 PT W/DXA RESULTS DOCUMENT: HCPCS | Performed by: INTERNAL MEDICINE

## 2025-06-19 RX ORDER — METHIMAZOLE 5 MG/1
TABLET ORAL
Qty: 45 TABLET | Refills: 1 | Status: SHIPPED | OUTPATIENT
Start: 2025-06-19

## 2025-06-19 NOTE — PROGRESS NOTES
several years at a time on diff occasions      Social History     Substance and Sexual Activity   Alcohol Use No       EXAM   Constitutional: no acute distress, well appearing, well nourished  Psychiatric: oriented to person, place and time, judgement, insight and normal, recent and remote memory and intact and mood, affect are normal  Skin: skin and subcutaneous tissue is normal without mass,   Head and Face: examination of head and face revealed no abnormalities  Eyes: no lid or conjunctival swelling, no erythema or discharge, pupils are normal,   Ears/Nose: external inspection of ears and nose revealed no abnormalities, hearing is grossly normal  Oropharynx/Mouth/Face: lips, tongue and gums are normal with no lesions, the voice quality was normal  Neck: neck is supple and symmetric, with midline trachea and no masses, thyroid is enlarged    Pulmonary: no increased work of breathing or signs of respiratory distress, lungs are clear to auscultation  Cardiovascular: normal heart rate and rhythm, normal S1 and S2,   Musculoskeletal: normal gait and station,   Neurological: normal coordination, normal general cortical function    Lab Results   Component Value Date    TSHFT4 0.88 12/05/2016    TSH 1.42 06/06/2025       Assessment/Plan    ---- TOXIC MULTINODULAR GOITER   She has  subclinical hyperthyroidism    ---tapazole 2.5 mg daily, TSH within normal limits she will stay on the current dose    --Previously she had issues tolerating the Tapazole and had skin rash but this time she was able to tolerated as she initiated Tapazole with the Benadryl and finally tapered herself off of the Benadryl.  There is no family history of thyroid cancer or radiation exposure.  --Nuclear scan done in August 2022 showed elevated thyroid uptake at 42.8% with no cord nodule identified although the left thyroid lobe nodule was hyperactive.  Follows with Dr Guzman and no surgery was recommended       Left thyroid lobe nodule  ----

## 2025-07-14 RX ORDER — CARVEDILOL 6.25 MG/1
6.25 TABLET ORAL 2 TIMES DAILY
Qty: 180 TABLET | Refills: 3 | Status: SHIPPED | OUTPATIENT
Start: 2025-07-14

## 2025-07-14 RX ORDER — LISINOPRIL 20 MG/1
20 TABLET ORAL 2 TIMES DAILY
Qty: 180 TABLET | Refills: 3 | Status: SHIPPED | OUTPATIENT
Start: 2025-07-14

## 2025-07-14 NOTE — TELEPHONE ENCOUNTER
Medication:   Requested Prescriptions     Pending Prescriptions Disp Refills    lisinopril (PRINIVIL;ZESTRIL) 20 MG tablet [Pharmacy Med Name: Lisinopril 20 MG Oral Tablet] 180 tablet 0     Sig: Take 1 tablet by mouth twice daily    carvedilol (COREG) 6.25 MG tablet [Pharmacy Med Name: Carvedilol 6.25 MG Oral Tablet] 180 tablet 0     Sig: Take 1 tablet by mouth twice daily       Last Filled:  01/14/2025 04/14/2025    Patient Phone Number: [phone] (home) [phone] (work)    Last appt: 6/11/2025   Next appt: Visit date not found    Lab Results   Component Value Date     06/06/2025    K 4.6 06/06/2025     06/06/2025    CO2 28 06/06/2025    BUN 22 (H) 06/06/2025    CREATININE 0.5 (L) 06/06/2025    GLUCOSE 87 09/13/2024    CALCIUM 9.4 06/06/2025    BILITOT 0.5 06/06/2025    ALKPHOS 59 06/06/2025    AST 20 06/06/2025    ALT 18 06/06/2025    LABGLOM >90 06/06/2025    GFRAA >60 10/14/2022    AGRATIO 2.0 06/06/2025    GLOB 2.5 07/02/2021